# Patient Record
Sex: MALE | Race: WHITE | Employment: OTHER
[De-identification: names, ages, dates, MRNs, and addresses within clinical notes are randomized per-mention and may not be internally consistent; named-entity substitution may affect disease eponyms.]

---

## 2017-03-09 ENCOUNTER — SURGERY (OUTPATIENT)
Age: 67
End: 2017-03-09

## 2017-03-09 ENCOUNTER — ANESTHESIA (OUTPATIENT)
Dept: ENDOSCOPY | Age: 67
End: 2017-03-09
Payer: MEDICARE

## 2017-03-09 ENCOUNTER — APPOINTMENT (OUTPATIENT)
Dept: GENERAL RADIOLOGY | Age: 67
End: 2017-03-09
Attending: INTERNAL MEDICINE
Payer: MEDICARE

## 2017-03-09 ENCOUNTER — ANESTHESIA EVENT (OUTPATIENT)
Dept: ENDOSCOPY | Age: 67
End: 2017-03-09
Payer: MEDICARE

## 2017-03-09 PROCEDURE — 74011250636 HC RX REV CODE- 250/636

## 2017-03-09 PROCEDURE — 74011000250 HC RX REV CODE- 250

## 2017-03-09 RX ORDER — LIDOCAINE HYDROCHLORIDE 20 MG/ML
INJECTION, SOLUTION EPIDURAL; INFILTRATION; INTRACAUDAL; PERINEURAL AS NEEDED
Status: DISCONTINUED | OUTPATIENT
Start: 2017-03-09 | End: 2017-03-09 | Stop reason: HOSPADM

## 2017-03-09 RX ORDER — PROPOFOL 10 MG/ML
INJECTION, EMULSION INTRAVENOUS
Status: DISCONTINUED | OUTPATIENT
Start: 2017-03-09 | End: 2017-03-09 | Stop reason: HOSPADM

## 2017-03-09 RX ORDER — PROPOFOL 10 MG/ML
INJECTION, EMULSION INTRAVENOUS AS NEEDED
Status: DISCONTINUED | OUTPATIENT
Start: 2017-03-09 | End: 2017-03-09 | Stop reason: HOSPADM

## 2017-03-09 RX ADMIN — PROPOFOL 30 MG: 10 INJECTION, EMULSION INTRAVENOUS at 12:26

## 2017-03-09 RX ADMIN — PROPOFOL 35 MG: 10 INJECTION, EMULSION INTRAVENOUS at 12:22

## 2017-03-09 RX ADMIN — LIDOCAINE HYDROCHLORIDE 100 MG: 20 INJECTION, SOLUTION EPIDURAL; INFILTRATION; INTRACAUDAL; PERINEURAL at 12:22

## 2017-03-09 RX ADMIN — PROPOFOL 140 MCG/KG/MIN: 10 INJECTION, EMULSION INTRAVENOUS at 12:22

## 2017-03-09 NOTE — ANESTHESIA POSTPROCEDURE EVALUATION
Post-Anesthesia Evaluation and Assessment    Patient: Amaury Pisano MRN: [id number]  SSN: [id number]    YOB: 1950  Age: 78 y.o. Sex: male       Cardiovascular Function/Vital Signs  Visit Vitals    /74    Pulse 63    Temp 37 °C (98.6 °F)    Resp 16    Ht 6' (1.829 m)    Wt 73.9 kg (163 lb)    SpO2 100%    BMI 22.11 kg/m2       Patient is status post total IV anesthesia anesthesia for Procedure(s):  ESOPHAGOGASTRODUODENOSCOPY (EGD)   BMI 21  ESOPHAGEAL DILATION. Nausea/Vomiting: None    Postoperative hydration reviewed and adequate. Pain:  Pain Scale 1: Numeric (0 - 10) (03/09/17 1311)  Pain Intensity 1: 0 (03/09/17 1311)   Managed    Neurological Status: At baseline    Mental Status and Level of Consciousness: Arousable    Pulmonary Status:   O2 Device: Room air (03/09/17 1311)   Adequate oxygenation and airway patent    Complications related to anesthesia: None    Post-anesthesia assessment completed.  No concerns    Signed By: Lavern Singh MD     March 9, 2017

## 2017-03-09 NOTE — ANESTHESIA PREPROCEDURE EVALUATION
Anesthetic History               Review of Systems / Medical History  Patient summary reviewed, nursing notes reviewed and pertinent labs reviewed    Pulmonary                   Neuro/Psych              Cardiovascular              Hyperlipidemia    Exercise tolerance: >4 METS     GI/Hepatic/Renal     GERD: well controlled          Comments: Esophageal stricture Endo/Other        Arthritis and cancer (Hx Tongue CA)     Other Findings            Physical Exam    Airway  Mallampati: II  TM Distance: > 6 cm  Neck ROM: normal range of motion   Mouth opening: Normal     Cardiovascular  Regular rate and rhythm,  S1 and S2 normal,  no murmur, click, rub, or gallop             Dental  No notable dental hx       Pulmonary  Breath sounds clear to auscultation               Abdominal  GI exam deferred       Other Findings            Anesthetic Plan    ASA: 3  Anesthesia type: total IV anesthesia - Neris block            Anesthetic plan and risks discussed with: Patient

## 2017-07-24 ENCOUNTER — HOSPITAL ENCOUNTER (OUTPATIENT)
Age: 67
Setting detail: OUTPATIENT SURGERY
Discharge: HOME | End: 2017-07-24
Attending: INTERNAL MEDICINE | Admitting: INTERNAL MEDICINE
Payer: MEDICARE

## 2017-07-24 ENCOUNTER — ANESTHESIA (OUTPATIENT)
Dept: ENDOSCOPY | Age: 67
End: 2017-07-24
Payer: MEDICARE

## 2017-07-24 ENCOUNTER — ANESTHESIA EVENT (OUTPATIENT)
Dept: ENDOSCOPY | Age: 67
End: 2017-07-24
Payer: MEDICARE

## 2017-07-24 ENCOUNTER — APPOINTMENT (OUTPATIENT)
Dept: GENERAL RADIOLOGY | Age: 67
End: 2017-07-24
Attending: INTERNAL MEDICINE
Payer: MEDICARE

## 2017-07-24 VITALS
TEMPERATURE: 98.6 F | SYSTOLIC BLOOD PRESSURE: 135 MMHG | RESPIRATION RATE: 18 BRPM | HEIGHT: 72 IN | WEIGHT: 161 LBS | BODY MASS INDEX: 21.81 KG/M2 | DIASTOLIC BLOOD PRESSURE: 85 MMHG | OXYGEN SATURATION: 100 % | HEART RATE: 54 BPM

## 2017-07-24 PROCEDURE — 74011250636 HC RX REV CODE- 250/636: Performed by: ANESTHESIOLOGY

## 2017-07-24 PROCEDURE — 76040000025: Performed by: INTERNAL MEDICINE

## 2017-07-24 PROCEDURE — 74011250636 HC RX REV CODE- 250/636

## 2017-07-24 PROCEDURE — 74011000250 HC RX REV CODE- 250

## 2017-07-24 PROCEDURE — 76060000032 HC ANESTHESIA 0.5 TO 1 HR: Performed by: INTERNAL MEDICINE

## 2017-07-24 RX ORDER — PROPOFOL 10 MG/ML
INJECTION, EMULSION INTRAVENOUS
Status: DISCONTINUED | OUTPATIENT
Start: 2017-07-24 | End: 2017-07-24 | Stop reason: HOSPADM

## 2017-07-24 RX ORDER — FENTANYL CITRATE 50 UG/ML
INJECTION, SOLUTION INTRAMUSCULAR; INTRAVENOUS AS NEEDED
Status: DISCONTINUED | OUTPATIENT
Start: 2017-07-24 | End: 2017-07-24 | Stop reason: HOSPADM

## 2017-07-24 RX ORDER — SODIUM CHLORIDE, SODIUM LACTATE, POTASSIUM CHLORIDE, CALCIUM CHLORIDE 600; 310; 30; 20 MG/100ML; MG/100ML; MG/100ML; MG/100ML
1000 INJECTION, SOLUTION INTRAVENOUS CONTINUOUS
Status: DISCONTINUED | OUTPATIENT
Start: 2017-07-24 | End: 2017-07-24 | Stop reason: HOSPADM

## 2017-07-24 RX ORDER — PROPOFOL 10 MG/ML
INJECTION, EMULSION INTRAVENOUS AS NEEDED
Status: DISCONTINUED | OUTPATIENT
Start: 2017-07-24 | End: 2017-07-24 | Stop reason: HOSPADM

## 2017-07-24 RX ORDER — LIDOCAINE HYDROCHLORIDE 20 MG/ML
INJECTION, SOLUTION EPIDURAL; INFILTRATION; INTRACAUDAL; PERINEURAL AS NEEDED
Status: DISCONTINUED | OUTPATIENT
Start: 2017-07-24 | End: 2017-07-24 | Stop reason: HOSPADM

## 2017-07-24 RX ADMIN — SODIUM CHLORIDE, SODIUM LACTATE, POTASSIUM CHLORIDE, AND CALCIUM CHLORIDE 1000 ML: 600; 310; 30; 20 INJECTION, SOLUTION INTRAVENOUS at 11:48

## 2017-07-24 RX ADMIN — PROPOFOL 160 MCG/KG/MIN: 10 INJECTION, EMULSION INTRAVENOUS at 12:24

## 2017-07-24 RX ADMIN — LIDOCAINE HYDROCHLORIDE 50 MG: 20 INJECTION, SOLUTION EPIDURAL; INFILTRATION; INTRACAUDAL; PERINEURAL at 12:21

## 2017-07-24 RX ADMIN — PROPOFOL 50 MG: 10 INJECTION, EMULSION INTRAVENOUS at 12:24

## 2017-07-24 RX ADMIN — PROPOFOL 50 MG: 10 INJECTION, EMULSION INTRAVENOUS at 12:21

## 2017-07-24 RX ADMIN — FENTANYL CITRATE 50 MCG: 50 INJECTION, SOLUTION INTRAMUSCULAR; INTRAVENOUS at 12:17

## 2017-07-24 NOTE — H&P
Gastrointestinal Progress Note    7/24/2017    Admit Date: 7/24/2017    Subjective:     Patient is on NPO for an EGD/dilatation. Pain: Patient complains of no abdominal pain. Bowel Movements: Normal    Bleeding:  None    Current Facility-Administered Medications   Medication Dose Route Frequency    lactated Ringers infusion 1,000 mL  1,000 mL IntraVENous CONTINUOUS        Objective:     Blood pressure 126/74, pulse 67, temperature 97.9 °F (36.6 °C), resp. rate 18, height 6' (1.829 m), weight 73 kg (161 lb), SpO2 100 %. EXAM:  HEART: regular rate and rhythm, S1, S2 normal, no murmur, click, rub or gallop, LUNGS: chest clear, no wheezing, rales, normal symmetric air entry, Heart exam - S1, S2 normal, no murmur, no gallop, rate regular and ABDOMEN:  Bowel sounds are normal, liver is not enlarged, spleen is not enlarged    Data Review  No results found for this or any previous visit (from the past 24 hour(s)). Assessment:     Active Problems:  Dysphagia associated with a radiation stricture      Plan:     EGD/dilatation. Risks (bleed, perforation, infection, untoward CV or resp. Event, mortality,etc.), benefits, and alternatives were discussed with the patient who agrees to proceed.   Aniyah Ibrahim MD

## 2017-07-24 NOTE — ANESTHESIA PREPROCEDURE EVALUATION
Anesthetic History               Review of Systems / Medical History  Patient summary reviewed, nursing notes reviewed and pertinent labs reviewed    Pulmonary                   Neuro/Psych              Cardiovascular              Hyperlipidemia    Exercise tolerance: >4 METS     GI/Hepatic/Renal     GERD: well controlled          Comments: Esophageal stricture Endo/Other        Arthritis and cancer (Hx Tongue CA)     Other Findings              Physical Exam    Airway  Mallampati: II  TM Distance: > 6 cm  Neck ROM: normal range of motion   Mouth opening: Normal     Cardiovascular  Regular rate and rhythm,  S1 and S2 normal,  no murmur, click, rub, or gallop             Dental  No notable dental hx       Pulmonary  Breath sounds clear to auscultation               Abdominal  GI exam deferred       Other Findings            Anesthetic Plan    ASA: 3  Anesthesia type: total IV anesthesia            Anesthetic plan and risks discussed with: Patient

## 2017-07-24 NOTE — ANESTHESIA POSTPROCEDURE EVALUATION
Post-Anesthesia Evaluation and Assessment    Patient: Xochitl Rangel MRN: [id number]  SSN: [id number]    YOB: 1950  Age: 75 y.o. Sex: male       Cardiovascular Function/Vital Signs  Visit Vitals    /73    Pulse 67    Temp 37 °C (98.6 °F)    Resp 15    Ht 6' (1.829 m)    Wt 73 kg (161 lb)    SpO2 100%    BMI 21.84 kg/m2       Patient is status post total IV anesthesia anesthesia for Procedure(s):  ESOPHAGOGASTRODUODENOSCOPY (EGD)   BMI 21  ESOPHAGEAL DILATION. Nausea/Vomiting: None    Postoperative hydration reviewed and adequate. Pain:  Pain Scale 1: Visual (07/24/17 1249)  Pain Intensity 1: 0 (07/24/17 1249)   Managed    Neurological Status: At baseline    Mental Status and Level of Consciousness: Arousable    Pulmonary Status:   O2 Device: Nasal cannula (07/24/17 1251)   Adequate oxygenation and airway patent    Complications related to anesthesia: None    Post-anesthesia assessment completed.  No concerns    Signed By: Niya Ames MD     July 24, 2017

## 2017-07-24 NOTE — DISCHARGE INSTRUCTIONS
Gastrointestinal Esophagogastroduodenoscopy (EGD) - Upper Exam Discharge Instructions    1. Call Dr. Deepika Chen for any problems or questions. 2. Contact the doctor's office for follow up appointment as directed. 3. Medication may cause drowsiness for several hours, therefore, do not drive or operate machinery for remainder of the day. 4. No alcohol today. 5. Ordinarily, you may resume regular diet and activity after exam unless otherwise specified by your physician. 6. For mild soreness in your throat you may use Cepacol throat lozenges or warm salt-water gargles as needed. Any additional instructions: Follow up dilation in 4 months. Clear liquid diet, advance to full liquids, soft foods as tolerated. Instructions given to Cherylene Plank and other family members.   Instructions given by:  Brian Bacon RN

## 2017-07-24 NOTE — IP AVS SNAPSHOT
[address] 
[phone] Patient: Rogers Reynolds MRN: [id number] NYP:0/80/8243 You are allergic to the following Allergen Reactions Hydrocodone Itching Recent Documentation Height Weight BMI Smoking Status 1.829 m 73 kg 21.84 kg/m2 Former Smoker Emergency Contacts Name Discharge Info Relation Home Work Mobile Viet Lan CAREGIVER [3] Spouse [3] 61 [phone] About your hospitalization You were admitted on:  July 24, 2017 You last received care in the:  [facility] You were discharged on:  July 24, 2017 Unit phone number:  [phone] Why you were hospitalized Your primary diagnosis was:  Not on File Providers Seen During Your Hospitalizations Provider Role Specialty Primary office phone Edwardo Min MD Attending Provider Gastroenterology [phone] Your Primary Care Physician (PCP) Primary Care Physician Office Phone Office Fax [address]64 [facility], [address] [facility] [phone] Follow-up Information Follow up With Details Comments Contact Info Neftali Maradiaga MD   [id number] [facility] [address] [place] [address],[address] [id number] [facility] 160 
[phone] Your Appointments Wednesday August 09, 2017  8:10 AM EDT Follow Up with Neftali Maradiaga MD  
[address] (32 [facility]) 981 [facility] [place] [address] [phone] Current Discharge Medication List  
  
CONTINUE these medications which have CHANGED Dose & Instructions Dispensing Information Comments Morning Noon Evening Bedtime  
 cyclobenzaprine 5 mg tablet Commonly known as:  FLEXERIL What changed:   
- when to take this 
- reasons to take this Your last dose was: Your next dose is:    
   
   
 Dose:  5 mg Take 1 Tab by mouth two (2) times a day. Quantity:  30 Tab Refills:  1  
     
   
   
   
  
 valACYclovir 500 mg tablet Commonly known as:  VALTREX What changed:  See the new instructions. Your last dose was: Your next dose is: TAKE 1 TABLET TWICE DAILY Quantity:  40 Tab Refills:  1 CONTINUE these medications which have NOT CHANGED Dose & Instructions Dispensing Information Comments Morning Noon Evening Bedtime  
 aspirin delayed-release 81 mg tablet Your last dose was: Your next dose is:    
   
   
 Dose:  81 mg Take 81 mg by mouth nightly. Indications: prevention of transient ischemic attack, CONTINUED Refills:  0  
     
   
   
   
  
 atorvastatin 10 mg tablet Commonly known as:  LIPITOR Your last dose was: Your next dose is:    
   
   
 Dose:  10 mg Take 1 Tab by mouth nightly. Indications: hypercholesterolemia Quantity:  90 Tab Refills:  2  
     
   
   
   
  
 calcium-vitamin D 600 mg(1,500mg) -200 unit Tab Your last dose was: Your next dose is:    
   
   
 Dose:  0.5 Tab Take 0.5 Tabs by mouth two (2) times daily (with meals). Last taken 2/27/17  Indications: hold until after surgery. held since 3/4/16 Refills:  0  
     
   
   
   
  
 cholecalciferol 1,000 unit Cap Commonly known as:  VITAMIN D3 Your last dose was: Your next dose is:    
   
   
 Dose:  1000 Units Take 1,000 Units by mouth daily. Stopped 2/27/17  Indications: VITAMIN D DEFICIENCY, hold until after surgery. held since 7/19/16 Refills:  0 DULoxetine 60 mg capsule Commonly known as:  CYMBALTA Your last dose was: Your next dose is: TAKE 1 CAPSULE EVERY MORNING  FOR  NEUROPATHIC  PAIN Quantity:  90 Cap Refills:  1  
     
   
   
   
  
 gabapentin 300 mg capsule Commonly known as:  NEURONTIN Your last dose was: Your next dose is:    
   
   
 Dose:  300 mg Take 1 Cap by mouth Daily (before dinner). Quantity:  90 Cap Refills:  1 Iron 325 mg (65 mg iron) tablet Generic drug:  ferrous sulfate Your last dose was: Your next dose is:    
   
   
 Dose:  325 mg Take 325 mg by mouth Daily (before breakfast). Indications: IRON DEFICIENCY ANEMIA Refills:  0  
     
   
   
   
  
 lutein 6 mg Tab Your last dose was: Your next dose is: Take  by mouth daily. Refills:  0 MULTIPLE VITAMINS PO Your last dose was: Your next dose is:    
   
   
 Dose:  0.5 Tab Take 0.5 Tabs by mouth two (2) times a day. Refills:  0  
     
   
   
   
  
 omega-3 fatty acids-vitamin e 1,000 mg Cap Your last dose was: Your next dose is:    
   
   
 Dose:  1 Cap Take 1 Cap by mouth two (2) times a day. Refills:  0  
     
   
   
   
  
 omeprazole 20 mg capsule Commonly known as:  PRILOSEC Your last dose was: Your next dose is: TAKE 1 CAPSULE EVERY MORNING Quantity:  90 Cap Refills:  1 Discharge Instructions Gastrointestinal Esophagogastroduodenoscopy (EGD) - Upper Exam Discharge Instructions 1. Call Dr. Grazyna Contreras for any problems or questions. 2. Contact the doctor's office for follow up appointment as directed. 3. Medication may cause drowsiness for several hours, therefore, do not drive or operate machinery for remainder of the day. 4. No alcohol today. 5. Ordinarily, you may resume regular diet and activity after exam unless otherwise specified by your physician. 6. For mild soreness in your throat you may use Cepacol throat lozenges or warm salt-water gargles as needed. Any additional instructions: Follow up dilation in 4 months. Clear liquid diet, advance to full liquids, soft foods as tolerated. Instructions given to Raymond Lion and other family members. Instructions given by:  Mira Rivera RN Discharge Orders None Introducing [facility]! Dear Haylie Marr: Thank you for requesting a [employer] account. Our records indicate that you already have an active [employer] account. You can access your account anytime at [email or web address] Did you know that you can access your hospital and ER discharge instructions at any time in [employer]? You can also review all of your test results from your hospital stay or ER visit. Additional Information If you have questions, please visit the Frequently Asked Questions section of the [employer] website at [email or web address]/. Remember, [employer] is NOT to be used for urgent needs. For medical emergencies, dial 911. Now available from your iPhone and Android! General Information Please provide this summary of care documentation to your next provider. Patient Signature:  ____________________________________________________________ Date:  ____________________________________________________________  
  
Cal Jimenez Provider Signature:  ____________________________________________________________ Date:  ____________________________________________________________

## 2017-07-25 NOTE — PROCEDURES
[facility]   PROCEDURE NOTE       Name:  Peyton Miller   MR#:  [id number]   :  1950   Account #:  [de-identified]   Date of Adm:  2017       DATE OF PROCEDURE: 2017    NAME OF PROCEDURE: Esophagogastroduodenoscopy. SUBJECTIVE: A 30-year-old male is undergoing upper endoscopy   because of dysphagia associated with proximal subtle radiation   stricture. Upper endoscopy done today to evaluate his dysphagia   and we will proceed with further dilatations under fluoroscopy. Risks (bleeding, perforation, infection, untoward cardiovascular   or respiratory mortality), benefits, and alternatives were   discussed in detail with Mr. Hardy [place] who agrees to proceed. ANESTHESIA: Per MAC anesthesia. INSTRUMENT: [id number]. PROCEDURE: The patient placed in left decubitus position,   videoscope was passed through the upper pharynx and esophagus   with minimal difficulty. Proximal, mid and distal esophagus were   unremarkable except for a very subtle proximal stricture through   which the endoscope passed without difficulty. Once inside the   stomach, the body and antrum were grossly unremarkable except   for erythema on the greater curvature of the antrum. A retroflex   view of the angularis was normal. Retroflex view of the EG   junction and cardia were normal.  Attention was turned to the   duodenum. Duodenal bulb and C-loop were normal. The endoscope   was backed into the stomach. Under fluoroscopy, a guidewire was placed. Guidewire was   advanced through the endoscope and maintaining good position in   the antrum as the endoscope was removed from the patient. Over the guidewire, numbers 12, 14 and 16 Savary dilators were   serially passed under fluoroscopy. Fluoroscopy was used to maintain   good position of the guidewire as well as to document that in   fact the dilator had passed through the distal esophagus.  Once   the 16 had passed, both dilator and guidewire were removed from   the patient. Endoscope was advanced back into the esophagus through the   hypopharynx. There was minimal heme just distal to the   cricopharyngeus. Except for some minor scope irritation to the   duodenal bulb (probably related to the guidewire), no other   abnormalities were appreciated. The endoscope was removed and   the patient went to the recovery area in stable condition. IMPRESSION   1. Gastritis. 2. Subtle proximal esophageal strictures. 3. Savary dilatation performed as described above (using   fluoroscopy). PLAN   DIAGNOSTIC: Followup dilatation every 4 months. THERAPEUTIC: Continue current medications.         MD CHANTAL [place] / GUY   D:  07/24/2017   12:50   T:  07/24/2017   21:09   Job #:  [id number]

## 2017-12-19 ENCOUNTER — ANESTHESIA EVENT (OUTPATIENT)
Dept: ENDOSCOPY | Age: 67
End: 2017-12-19
Payer: MEDICARE

## 2017-12-19 RX ORDER — SODIUM CHLORIDE, SODIUM LACTATE, POTASSIUM CHLORIDE, CALCIUM CHLORIDE 600; 310; 30; 20 MG/100ML; MG/100ML; MG/100ML; MG/100ML
100 INJECTION, SOLUTION INTRAVENOUS CONTINUOUS
Status: CANCELLED | OUTPATIENT
Start: 2017-12-19

## 2017-12-19 RX ORDER — SODIUM CHLORIDE 0.9 % (FLUSH) 0.9 %
5-10 SYRINGE (ML) INJECTION AS NEEDED
Status: CANCELLED | OUTPATIENT
Start: 2017-12-19

## 2017-12-20 ENCOUNTER — HOSPITAL ENCOUNTER (OUTPATIENT)
Age: 67
Setting detail: OUTPATIENT SURGERY
Discharge: HOME | End: 2017-12-20
Attending: INTERNAL MEDICINE | Admitting: INTERNAL MEDICINE
Payer: MEDICARE

## 2017-12-20 ENCOUNTER — ANESTHESIA (OUTPATIENT)
Dept: ENDOSCOPY | Age: 67
End: 2017-12-20
Payer: MEDICARE

## 2017-12-20 ENCOUNTER — APPOINTMENT (OUTPATIENT)
Dept: GENERAL RADIOLOGY | Age: 67
End: 2017-12-20
Attending: INTERNAL MEDICINE
Payer: MEDICARE

## 2017-12-20 VITALS
OXYGEN SATURATION: 100 % | HEIGHT: 72 IN | SYSTOLIC BLOOD PRESSURE: 122 MMHG | HEART RATE: 71 BPM | DIASTOLIC BLOOD PRESSURE: 71 MMHG | RESPIRATION RATE: 20 BRPM | TEMPERATURE: 97.9 F | WEIGHT: 162 LBS | BODY MASS INDEX: 21.94 KG/M2

## 2017-12-20 PROCEDURE — 74011250636 HC RX REV CODE- 250/636

## 2017-12-20 PROCEDURE — 74011000250 HC RX REV CODE- 250: Performed by: ANESTHESIOLOGY

## 2017-12-20 PROCEDURE — 74011250636 HC RX REV CODE- 250/636: Performed by: ANESTHESIOLOGY

## 2017-12-20 PROCEDURE — 76040000025: Performed by: INTERNAL MEDICINE

## 2017-12-20 PROCEDURE — 76060000031 HC ANESTHESIA FIRST 0.5 HR: Performed by: INTERNAL MEDICINE

## 2017-12-20 RX ORDER — PROPOFOL 10 MG/ML
INJECTION, EMULSION INTRAVENOUS AS NEEDED
Status: DISCONTINUED | OUTPATIENT
Start: 2017-12-20 | End: 2017-12-20 | Stop reason: HOSPADM

## 2017-12-20 RX ORDER — SODIUM CHLORIDE, SODIUM LACTATE, POTASSIUM CHLORIDE, CALCIUM CHLORIDE 600; 310; 30; 20 MG/100ML; MG/100ML; MG/100ML; MG/100ML
100 INJECTION, SOLUTION INTRAVENOUS CONTINUOUS
Status: DISCONTINUED | OUTPATIENT
Start: 2017-12-20 | End: 2017-12-20 | Stop reason: HOSPADM

## 2017-12-20 RX ADMIN — SODIUM CHLORIDE, SODIUM LACTATE, POTASSIUM CHLORIDE, AND CALCIUM CHLORIDE 100 ML/HR: 600; 310; 30; 20 INJECTION, SOLUTION INTRAVENOUS at 11:56

## 2017-12-20 RX ADMIN — PROPOFOL 50 MG: 10 INJECTION, EMULSION INTRAVENOUS at 12:56

## 2017-12-20 RX ADMIN — PROPOFOL 40 MG: 10 INJECTION, EMULSION INTRAVENOUS at 12:59

## 2017-12-20 RX ADMIN — PROPOFOL 50 MG: 10 INJECTION, EMULSION INTRAVENOUS at 12:53

## 2017-12-20 RX ADMIN — FAMOTIDINE 20 MG: 10 INJECTION, SOLUTION INTRAVENOUS at 12:01

## 2017-12-20 NOTE — ROUTINE PROCESS
Pt. Discharged to car by Kya Dias with family . Vital signs stable. Able to tolerate PO fluids. Passing gas.  Seen by MD.

## 2017-12-20 NOTE — ANESTHESIA POSTPROCEDURE EVALUATION
Post-Anesthesia Evaluation and Assessment    Patient: Ade Blount MRN: [id number]  SSN: [id number]    YOB: 1950  Age: 75 y.o. Sex: male       Cardiovascular Function/Vital Signs  Visit Vitals    BP (!) 147/93    Pulse 70    Temp 36.6 °C (97.9 °F)    Resp 20    Ht 6' (1.829 m)    Wt 73.5 kg (162 lb)    SpO2 100%    BMI 21.97 kg/m2       Patient is status post total IV anesthesia anesthesia for Procedure(s):  ESOPHAGOGASTRODUODENOSCOPY (EGD)/ BMI=21  ESOPHAGEAL DILATION. Nausea/Vomiting: None    Postoperative hydration reviewed and adequate. Pain:  Pain Scale 1: Numeric (0 - 10) (12/20/17 1333)  Pain Intensity 1: 0 (12/20/17 1145)   Managed    Neurological Status: At baseline    Mental Status and Level of Consciousness: Arousable    Pulmonary Status:   O2 Device: Room air (12/20/17 1333)   Adequate oxygenation and airway patent    Complications related to anesthesia: None    Post-anesthesia assessment completed.  No concerns    Signed By: Talia Murray MD     December 20, 2017

## 2017-12-20 NOTE — DISCHARGE INSTRUCTIONS
Gastrointestinal Esophagogastroduodenoscopy (EGD) - Upper Exam Discharge Instructions    1. Call Dr. Gwen Malin at [phone] for any problems or questions. 2. Contact the doctor's office for follow up appointment as directed. 3. Medication may cause drowsiness for several hours, therefore, do not drive or operate machinery for remainder of the day. 4. No alcohol today. 5. Ordinarily, you may resume regular diet and activity after exam unless otherwise specified by your physician. 6. For mild soreness in your throat you may use  throat lozenges or warm salt-water gargles as needed. Any additional instructions:   Follow up as needed

## 2017-12-20 NOTE — H&P
Gastrointestinal Progress Note    12/20/2017    Admit Date: 12/20/2017    Subjective:     Patient is NPO for an EGD/dilatation. Pain: Patient complains of no abdominal pain. Bowel Movements: Normal    Bleeding:  None    Current Facility-Administered Medications   Medication Dose Route Frequency    lactated Ringers infusion  100 mL/hr IntraVENous CONTINUOUS        Objective:     Blood pressure 112/69, pulse 70, temperature 97.9 °F (36.6 °C), resp. rate 16, height 6' (1.829 m), weight 73.5 kg (162 lb), SpO2 100 %. EXAM:  HEART: regular rate and rhythm, S1, S2 normal, no murmur, click, rub or gallop, LUNGS: chest clear, no wheezing, rales, normal symmetric air entry, Heart exam - S1, S2 normal, no murmur, no gallop, rate regular and ABDOMEN:  Bowel sounds are normal, liver is not enlarged, spleen is not enlarged    Data Review  No results found for this or any previous visit (from the past 24 hour(s)). Assessment:     Active Problems:  Dysphagia associated with a radiation stricture        Plan:     EGD/dilatation. Risks (bleed, perforation, infection, untoward CV or resp. Event, mortality, etc.), benefits and alternatives were discussed with the patient who agrees to proceed.   Monika Choi MD

## 2017-12-20 NOTE — IP AVS SNAPSHOT
Ara Hunt 
 
 
 [address] [facility] [address] [facility] 
[phone] Patient: Farooq Valles MRN: [id number] DLA:8/76/[id number] About your hospitalization You were admitted on:  December 20, 2017 You last received care in the:  [facility] You were discharged on:  December 20, 2017 Why you were hospitalized Your primary diagnosis was:  Not on File Things You Need To Do (next 8 weeks) Follow up with Ilda eKvin MD  
  
Phone:  [phone] Where:  [address] [facility], , [address] [id number] Tuesday Feb 13, 2018 Follow Up with Ilda Kevin MD at  8:10 AM  
Where:  [address] ([address] [place] [address]) Discharge Orders None A check aspen indicates which time of day the medication should be taken. My Medications TAKE these medications as instructed Instructions Each Dose to Equal  
 Morning Noon Evening Bedtime  
 aspirin delayed-release 81 mg tablet Your last dose was: Your next dose is: Take 81 mg by mouth nightly. Indications: prevention of transient ischemic attack, CONTINUED 81 mg  
    
   
   
   
  
 atorvastatin 10 mg tablet Commonly known as:  LIPITOR Your last dose was: Your next dose is: Take 1 Tab by mouth nightly. Indications: hypercholesterolemia 10 mg  
    
   
   
   
  
 calcium-vitamin D 600 mg(1,500mg) -200 unit Tab Your last dose was: Your next dose is: Take 0.5 Tabs by mouth two (2) times daily (with meals). Last taken 2/27/17  Indications: hold until after surgery. held since 3/4/16  
 0.5 Tab  
    
   
   
   
  
 celecoxib 200 mg capsule Commonly known as:  CELEBREX Your last dose was: Your next dose is:    
   
   
      
   
   
   
  
 cholecalciferol 1,000 unit Cap Commonly known as:  VITAMIN D3  
   
 Your last dose was: Your next dose is: Take 1,000 Units by mouth daily. Stopped 2/27/17  Indications: VITAMIN D DEFICIENCY, hold until after surgery. held since 7/19/16  
 1000 Units  
    
   
   
   
  
 cyclobenzaprine 5 mg tablet Commonly known as:  FLEXERIL Your last dose was: Your next dose is: Take 1 Tab by mouth two (2) times a day. 5 mg DULoxetine 60 mg capsule Commonly known as:  CYMBALTA Your last dose was: Your next dose is: TAKE 1 CAPSULE EVERY MORNING  FOR  NEUROPATHIC  PAIN  
     
   
   
   
  
 gabapentin 300 mg capsule Commonly known as:  NEURONTIN Your last dose was: Your next dose is: Take 1 Cap by mouth Daily (before dinner). 300 mg  
    
   
   
   
  
 ibuprofen 200 mg tablet Commonly known as:  MOTRIN Your last dose was: Your next dose is: Take 200 mg by mouth as needed. 200 mg Iron 325 mg (65 mg iron) tablet Generic drug:  ferrous sulfate Your last dose was: Your next dose is: Take 325 mg by mouth Daily (before breakfast). Indications: IRON DEFICIENCY ANEMIA  
 325 mg  
    
   
   
   
  
 lutein 6 mg Tab Your last dose was: Your next dose is: Take  by mouth daily. MULTIPLE VITAMINS PO Your last dose was: Your next dose is: Take 0.5 Tabs by mouth two (2) times a day. 0.5 Tab  
    
   
   
   
  
 omega-3 fatty acids-vitamin e 1,000 mg Cap Your last dose was: Your next dose is: Take 1 Cap by mouth two (2) times a day. 1 Cap  
    
   
   
   
  
 omeprazole 20 mg capsule Commonly known as:  PRILOSEC Your last dose was: Your next dose is: TAKE 1 CAPSULE EVERY MORNING  
     
   
   
   
  
 RESTASIS 0.05 % ophthalmic emulsion Generic drug:  cycloSPORINE Your last dose was: Your next dose is:    
   
   
      
   
   
   
  
 valACYclovir 500 mg tablet Commonly known as:  VALTREX Your last dose was: Your next dose is: TAKE 1 TABLET TWICE DAILY Discharge Instructions Gastrointestinal Esophagogastroduodenoscopy (EGD) - Upper Exam Discharge Instructions 1. Call Dr. Nelson Muniz at [phone] for any problems or questions. 2. Contact the doctor's office for follow up appointment as directed. 3. Medication may cause drowsiness for several hours, therefore, do not drive or operate machinery for remainder of the day. 4. No alcohol today. 5. Ordinarily, you may resume regular diet and activity after exam unless otherwise specified by your physician. 6. For mild soreness in your throat you may use  throat lozenges or warm salt-water gargles as needed. Any additional instructions: Follow up as needed Introducing hospitals & HEALTH SERVICES! Dear Jeff Kay: Thank you for requesting a [employer] account. Our records indicate that you already have an active [employer] account. You can access your account anytime at [email or web address] Did you know that you can access your hospital and ER discharge instructions at any time in Game Ins[employer]? You can also review all of your test results from your hospital stay or ER visit. Additional Information If you have questions, please visit the Frequently Asked Questions section of the Game Ins[employer] website at [email or web address] Remember, Game Insight is NOT to be used for urgent needs. For medical emergencies, dial 911. Now available from your iPhone and Android! Unresulted Labs-Please follow up with your PCP about these lab tests Order Current Status NC XR TECHNOLOGIST SERVICE In process Providers Seen During Your Hospitalization Provider Specialty Primary office phone Antelmo Thorpe MD Gastroenterology [phone] Your Primary Care Physician (PCP) Primary Care Physician Office Phone Office Fax [address] [facility], [address] [phone] You are allergic to the following Allergen Reactions Hydrocodone Itching Recent Documentation Height Weight BMI Smoking Status 1.829 m 73.5 kg 21.97 kg/m2 Former Smoker Emergency Contacts Name Discharge Info Relation Home Work Mobile Alyse Naik CAREGIVER [3] Spouse [3] [phone] Patient Belongings The following personal items are in your possession at time of discharge: 
  Dental Appliances: None  Visual Aid: Glasses Please provide this summary of care documentation to your next provider. Signatures-by signing, you are acknowledging that this After Visit Summary has been reviewed with you and you have received a copy. Patient Signature:  ____________________________________________________________ Date:  ____________________________________________________________  
  
Josie Artist Provider Signature:  ____________________________________________________________ Date:  ____________________________________________________________

## 2017-12-21 NOTE — OP NOTES
[facility]  OPERATIVE REPORT    Lynn Berman  MR#: [id number]  : 1950  ACCOUNT #: [de-identified]   DATE OF SERVICE: 2017    DATE OF PROCEDURE:  2017    SURGEON:  Melissa Vega MD    PROCEDURE PERFORMED:  Esophagogastroduodenoscopy. ANESTHESIA:  MAC    ESTIMATED BLOOD LOSS:  None    SPECIMENS REMOVED:  none    COMPLICATIONS:  none    SUBJECTIVE:  A 18-year-old male was undergoing upper endoscopy because of dysphagia and a history of radiation stricture. Upper endoscopy done to document the source of his worsening dysphagia and proceed with dilatations under fluoroscopy. Risks (bleeding, perforation, infection, untoward cardiovascular or respiratory event, mortality), benefits and alternatives were discussed in detail with the patient, who agrees to proceed. ANESTHESIA:  As per MAC anesthesia. INSTRUMENT:  GIF Q180 videoendoscope. DESCRIPTION OF PROCEDURE:  The videoendoscope was passed through the hypopharynx and esophagus with minimal difficulty. There was a narrowing at the cricopharyngeus, but we advanced the endoscope through this area with minimal difficulty. Proximal, mid and distal esophagus were unmarkable. Once in the stomach, the body and antrum were remarkable for mild streaky erythema. A retroflexed view of the angularis was normal.  Retroflexed view of the GE junction and cardia revealed no other specific abnormalities. Attention turned to the duodenum. Duodenal bulb and C loop were normal.  No active ulcer disease was seen. The endoscope was passed to the stomach. Under fluoroscopy, the Savary guidewire was advanced into the gastric lumen, and maintain good positioning after the endoscope was removed from the patient.     Over the guidewire, numbers 12, 14 and Savary dilators (36, 42 and 1900 Jobinasecond) were  serially passed and under fluoroscopy, I documented that the widest part of the dilator had advanced through the GE junction. Once the 48 had passed, both dilator and guidewire were removed from the patient. The endoscope was then advanced back into the stomach. There was minimal heme at the cricopharyngeus, but mucosa appeared intact. The proximal mid and distal esophagus as well as the proximal body of stomach were unremarkable. The endoscope was passed to the proximal stomach where air was decompressed and back out of the patient. The vocal cords appeared normal. The patient taken to the recovery area in stable condition. IMPRESSION:  1. Radiation stricture - dilated up to 16 mm (48 Cymro via Savary dilator with minimal difficulty. 2.  Gastritis. PLAN/THERAPY:    1. Followup dilatation in 4 months. 2.  Continue PPI therapy.       MD CHANTAL Kraus / [email or web address].Chris  D: 12/20/2017 13:22     T: 12/20/2017 21:12  JOB #: [id number]  CC: Kianna Smith MD  [address] [facility]  [place], [address] [facility]

## 2018-04-11 ENCOUNTER — ANESTHESIA EVENT (OUTPATIENT)
Dept: ENDOSCOPY | Age: 68
End: 2018-04-11
Payer: MEDICARE

## 2018-04-11 RX ORDER — OXYCODONE AND ACETAMINOPHEN 5; 325 MG/1; MG/1
1 TABLET ORAL AS NEEDED
Status: CANCELLED | OUTPATIENT
Start: 2018-04-11

## 2018-04-11 RX ORDER — SODIUM CHLORIDE, SODIUM LACTATE, POTASSIUM CHLORIDE, CALCIUM CHLORIDE 600; 310; 30; 20 MG/100ML; MG/100ML; MG/100ML; MG/100ML
100 INJECTION, SOLUTION INTRAVENOUS CONTINUOUS
Status: CANCELLED | OUTPATIENT
Start: 2018-04-11 | End: 2018-04-12

## 2018-04-11 RX ORDER — SODIUM CHLORIDE 0.9 % (FLUSH) 0.9 %
5-10 SYRINGE (ML) INJECTION AS NEEDED
Status: CANCELLED | OUTPATIENT
Start: 2018-04-11

## 2018-04-11 RX ORDER — MIDAZOLAM HYDROCHLORIDE 1 MG/ML
2 INJECTION, SOLUTION INTRAMUSCULAR; INTRAVENOUS
Status: CANCELLED | OUTPATIENT
Start: 2018-04-11

## 2018-04-11 RX ORDER — HYDROMORPHONE HYDROCHLORIDE 2 MG/ML
0.5 INJECTION, SOLUTION INTRAMUSCULAR; INTRAVENOUS; SUBCUTANEOUS
Status: CANCELLED | OUTPATIENT
Start: 2018-04-11

## 2018-04-11 RX ORDER — SODIUM CHLORIDE, SODIUM LACTATE, POTASSIUM CHLORIDE, CALCIUM CHLORIDE 600; 310; 30; 20 MG/100ML; MG/100ML; MG/100ML; MG/100ML
100 INJECTION, SOLUTION INTRAVENOUS CONTINUOUS
Status: CANCELLED | OUTPATIENT
Start: 2018-04-11

## 2018-04-11 RX ORDER — SODIUM CHLORIDE 0.9 % (FLUSH) 0.9 %
5-10 SYRINGE (ML) INJECTION EVERY 8 HOURS
Status: CANCELLED | OUTPATIENT
Start: 2018-04-11

## 2018-04-11 RX ORDER — SODIUM CHLORIDE 9 MG/ML
50 INJECTION, SOLUTION INTRAVENOUS CONTINUOUS
Status: CANCELLED | OUTPATIENT
Start: 2018-04-11 | End: 2018-04-12

## 2018-04-11 RX ORDER — FAMOTIDINE 20 MG/1
20 TABLET, FILM COATED ORAL ONCE
Status: CANCELLED | OUTPATIENT
Start: 2018-04-11 | End: 2018-04-11

## 2018-04-11 RX ORDER — LIDOCAINE HYDROCHLORIDE 10 MG/ML
0.1 INJECTION INFILTRATION; PERINEURAL AS NEEDED
Status: CANCELLED | OUTPATIENT
Start: 2018-04-11

## 2018-04-11 RX ORDER — FENTANYL CITRATE 50 UG/ML
25 INJECTION, SOLUTION INTRAMUSCULAR; INTRAVENOUS ONCE
Status: CANCELLED | OUTPATIENT
Start: 2018-04-11 | End: 2018-04-11

## 2018-04-11 RX ORDER — OXYCODONE HYDROCHLORIDE 5 MG/1
5 TABLET ORAL
Status: CANCELLED | OUTPATIENT
Start: 2018-04-11

## 2018-04-11 RX ORDER — DIPHENHYDRAMINE HYDROCHLORIDE 50 MG/ML
12.5 INJECTION, SOLUTION INTRAMUSCULAR; INTRAVENOUS ONCE
Status: CANCELLED | OUTPATIENT
Start: 2018-04-11 | End: 2018-04-11

## 2018-04-11 RX ORDER — MIDAZOLAM HYDROCHLORIDE 1 MG/ML
2 INJECTION, SOLUTION INTRAMUSCULAR; INTRAVENOUS ONCE
Status: CANCELLED | OUTPATIENT
Start: 2018-04-11 | End: 2018-04-11

## 2018-04-12 ENCOUNTER — APPOINTMENT (OUTPATIENT)
Dept: GENERAL RADIOLOGY | Age: 68
End: 2018-04-12
Attending: INTERNAL MEDICINE
Payer: MEDICARE

## 2018-04-12 ENCOUNTER — ANESTHESIA (OUTPATIENT)
Dept: ENDOSCOPY | Age: 68
End: 2018-04-12
Payer: MEDICARE

## 2018-04-12 ENCOUNTER — HOSPITAL ENCOUNTER (OUTPATIENT)
Age: 68
Setting detail: OUTPATIENT SURGERY
Discharge: HOME | End: 2018-04-12
Attending: INTERNAL MEDICINE | Admitting: INTERNAL MEDICINE
Payer: MEDICARE

## 2018-04-12 VITALS
BODY MASS INDEX: 21.81 KG/M2 | DIASTOLIC BLOOD PRESSURE: 73 MMHG | HEART RATE: 69 BPM | WEIGHT: 161 LBS | SYSTOLIC BLOOD PRESSURE: 106 MMHG | TEMPERATURE: 98.6 F | OXYGEN SATURATION: 100 % | RESPIRATION RATE: 16 BRPM | HEIGHT: 72 IN

## 2018-04-12 PROCEDURE — 76060000032 HC ANESTHESIA 0.5 TO 1 HR: Performed by: INTERNAL MEDICINE

## 2018-04-12 PROCEDURE — 76040000025: Performed by: INTERNAL MEDICINE

## 2018-04-12 PROCEDURE — 74011250636 HC RX REV CODE- 250/636

## 2018-04-12 RX ORDER — SODIUM CHLORIDE, SODIUM LACTATE, POTASSIUM CHLORIDE, CALCIUM CHLORIDE 600; 310; 30; 20 MG/100ML; MG/100ML; MG/100ML; MG/100ML
INJECTION, SOLUTION INTRAVENOUS
Status: DISCONTINUED | OUTPATIENT
Start: 2018-04-12 | End: 2018-04-12 | Stop reason: HOSPADM

## 2018-04-12 RX ORDER — PROPOFOL 10 MG/ML
INJECTION, EMULSION INTRAVENOUS
Status: DISCONTINUED | OUTPATIENT
Start: 2018-04-12 | End: 2018-04-12 | Stop reason: HOSPADM

## 2018-04-12 RX ORDER — PROPOFOL 10 MG/ML
INJECTION, EMULSION INTRAVENOUS AS NEEDED
Status: DISCONTINUED | OUTPATIENT
Start: 2018-04-12 | End: 2018-04-12 | Stop reason: HOSPADM

## 2018-04-12 RX ADMIN — PROPOFOL 20 MG: 10 INJECTION, EMULSION INTRAVENOUS at 12:48

## 2018-04-12 RX ADMIN — PROPOFOL 10 MG: 10 INJECTION, EMULSION INTRAVENOUS at 12:46

## 2018-04-12 RX ADMIN — PROPOFOL 20 MG: 10 INJECTION, EMULSION INTRAVENOUS at 12:45

## 2018-04-12 RX ADMIN — PROPOFOL 10 MG: 10 INJECTION, EMULSION INTRAVENOUS at 12:43

## 2018-04-12 RX ADMIN — PROPOFOL 20 MG: 10 INJECTION, EMULSION INTRAVENOUS at 12:54

## 2018-04-12 RX ADMIN — PROPOFOL 10 MG: 10 INJECTION, EMULSION INTRAVENOUS at 12:55

## 2018-04-12 RX ADMIN — PROPOFOL 30 MG: 10 INJECTION, EMULSION INTRAVENOUS at 12:42

## 2018-04-12 RX ADMIN — PROPOFOL 50 MG: 10 INJECTION, EMULSION INTRAVENOUS at 12:40

## 2018-04-12 RX ADMIN — PROPOFOL 20 MG: 10 INJECTION, EMULSION INTRAVENOUS at 12:50

## 2018-04-12 RX ADMIN — PROPOFOL 20 MG: 10 INJECTION, EMULSION INTRAVENOUS at 12:44

## 2018-04-12 RX ADMIN — PROPOFOL 200 MCG/KG/MIN: 10 INJECTION, EMULSION INTRAVENOUS at 12:40

## 2018-04-12 RX ADMIN — PROPOFOL 20 MG: 10 INJECTION, EMULSION INTRAVENOUS at 12:52

## 2018-04-12 RX ADMIN — SODIUM CHLORIDE, SODIUM LACTATE, POTASSIUM CHLORIDE, CALCIUM CHLORIDE: 600; 310; 30; 20 INJECTION, SOLUTION INTRAVENOUS at 12:32

## 2018-04-12 RX ADMIN — PROPOFOL 20 MG: 10 INJECTION, EMULSION INTRAVENOUS at 12:56

## 2018-04-12 NOTE — ROUTINE PROCESS
Patient discharged home via wheelchair with family to home. Discharge instructions given to patient and wife and understanding expressed.

## 2018-04-12 NOTE — ANESTHESIA POSTPROCEDURE EVALUATION
Post-Anesthesia Evaluation and Assessment    Patient: Bard Alvares MRN: [id number]  SSN: [id number]    YOB: 1950  Age: 75 y.o. Sex: male       Cardiovascular Function/Vital Signs  Visit Vitals    /68    Pulse 67    Temp 37 °C (98.6 °F)    Resp 16    Ht 6' (1.829 m)    Wt 73 kg (161 lb)    SpO2 100%    BMI 21.84 kg/m2       Patient is status post total IV anesthesia anesthesia for Procedure(s):  ESOPHAGOGASTRODUODENOSCOPY (EGD)   BMI 28  ESOPHAGEAL DILATION. Nausea/Vomiting: None    Postoperative hydration reviewed and adequate. Pain:  Pain Scale 1: Numeric (0 - 10) (04/12/18 1159)  Pain Intensity 1: 0 (04/12/18 1159)   Managed    Neurological Status: At baseline    Mental Status and Level of Consciousness: Arousable    Pulmonary Status:   O2 Device: Room air (04/12/18 1305)   Adequate oxygenation and airway patent    Complications related to anesthesia: None    Post-anesthesia assessment completed.  No concerns    Signed By: Salud Albert MD     April 12, 2018

## 2018-04-12 NOTE — ANESTHESIA PREPROCEDURE EVALUATION
Anesthetic History   No history of anesthetic complications            Review of Systems / Medical History  Patient summary reviewed and pertinent labs reviewed    Pulmonary  Within defined limits                 Neuro/Psych   Within defined limits           Cardiovascular              Hyperlipidemia    Exercise tolerance: >4 METS     GI/Hepatic/Renal     GERD           Endo/Other        Arthritis, cancer (base of tongue, s/p radiation) and anemia     Other Findings   Comments: Esophageal stricture           Physical Exam    Airway  Mallampati: II  TM Distance: 4 - 6 cm  Neck ROM: normal range of motion   Mouth opening: Normal     Cardiovascular  Regular rate and rhythm,  S1 and S2 normal,  no murmur, click, rub, or gallop  Rhythm: regular  Rate: normal         Dental    Dentition: Bridges     Pulmonary  Breath sounds clear to auscultation               Abdominal  GI exam deferred       Other Findings            Anesthetic Plan    ASA: 2  Anesthesia type: total IV anesthesia          Induction: Intravenous  Anesthetic plan and risks discussed with: Patient

## 2018-04-12 NOTE — IP AVS SNAPSHOT
[address] [facility] 
 
 
 [address] 
[phone] Patient: Rogers Reynolds MRN: [id number] WWL:9/60/5874 About your hospitalization You were admitted on:  April 12, 2018 You last received care in the:  [facility] You were discharged on:  April 12, 2018 Why you were hospitalized Your primary diagnosis was:  Not on File Follow-up Information Follow up With Details Comments Contact Info Cleopatra Huff MD   [id number] [address] [facility] [address] 
[phone] Your Scheduled Appointments Monday May 14, 2018  8:40 AM EDT Office Visit with Cleopatra Huff MD  
[address] ([address] [place] [address]) [address] [address]  
[phone] Discharge Orders None A check aspen indicates which time of day the medication should be taken. My Medications CHANGE how you take these medications Instructions Each Dose to Equal  
 Morning Noon Evening Bedtime  
 cyclobenzaprine 5 mg tablet Commonly known as:  FLEXERIL What changed:   
- when to take this 
- reasons to take this Your last dose was: Your next dose is: Take 1 Tab by mouth two (2) times a day. 5 mg  
    
   
   
   
  
 valACYclovir 500 mg tablet Commonly known as:  VALTREX What changed:  See the new instructions. Your last dose was: Your next dose is: TAKE 1 TABLET TWICE DAILY CONTINUE taking these medications Instructions Each Dose to Equal  
 Morning Noon Evening Bedtime  
 aspirin delayed-release 81 mg tablet Your last dose was: Your next dose is: Take 81 mg by mouth nightly. Indications: prevention of transient ischemic attack, CONTINUED 81 mg  
    
   
   
   
  
 atorvastatin 10 mg tablet Commonly known as:  LIPITOR Your last dose was: Your next dose is: Take 1 Tab by mouth nightly. Indications: hypercholesterolemia 10 mg  
    
   
   
   
  
 calcium-vitamin D 600 mg(1,500mg) -200 unit Tab Your last dose was: Your next dose is: Take 0.5 Tabs by mouth two (2) times daily (with meals). Last taken 2/27/17  Indications: hold until after surgery. held since 3/4/16  
 0.5 Tab  
    
   
   
   
  
 celecoxib 200 mg capsule Commonly known as:  CELEBREX Your last dose was: Your next dose is:    
   
   
 as needed. cholecalciferol 1,000 unit Cap Commonly known as:  VITAMIN D3 Your last dose was: Your next dose is: Take 1,000 Units by mouth daily. Stopped 2/27/17  Indications: VITAMIN D DEFICIENCY, hold until after surgery. held since 7/19/16  
 1000 Units DULoxetine 60 mg capsule Commonly known as:  CYMBALTA Your last dose was: Your next dose is: TAKE 1 CAPSULE EVERY MORNING  FOR  NEUROPATHIC  PAIN  
     
   
   
   
  
 gabapentin 300 mg capsule Commonly known as:  NEURONTIN Your last dose was: Your next dose is: Take 1 Cap by mouth Daily (before dinner). 300 mg  
    
   
   
   
  
 ibuprofen 200 mg tablet Commonly known as:  MOTRIN Your last dose was: Your next dose is: Take 200 mg by mouth as needed. 200 mg Iron 325 mg (65 mg iron) tablet Generic drug:  ferrous sulfate Your last dose was: Your next dose is: Take 325 mg by mouth Daily (before breakfast). Indications: IRON DEFICIENCY ANEMIA  
 325 mg  
    
   
   
   
  
 lutein 6 mg Tab Your last dose was: Your next dose is: Take 20 mg by mouth daily. 20 mg  
    
   
   
   
  
 MULTIPLE VITAMINS PO Your last dose was: Your next dose is: Take 0.5 Tabs by mouth two (2) times a day. 0.5 Tab  
    
   
   
   
  
 omega-3 fatty acids-vitamin e 1,000 mg Cap Your last dose was: Your next dose is: Take 1 Cap by mouth two (2) times a day. 1 Cap  
    
   
   
   
  
 omeprazole 20 mg capsule Commonly known as:  PRILOSEC Your last dose was: Your next dose is: TAKE 1 CAPSULE EVERY MORNING  
     
   
   
   
  
 RESTASIS 0.05 % ophthalmic emulsion Generic drug:  cycloSPORINE Your last dose was: Your next dose is:    
   
   
      
   
   
   
  
  
  
  
Discharge Instructions Gastrointestinal Esophagogastroduodenoscopy (EGD) - Upper Exam Discharge Instructions 1. Call Dr. Bar Beckwith at [phone] for any problems or questions. 2. Contact the doctor's office for follow up appointment as directed. 3. Medication may cause drowsiness for several hours, therefore, do not drive or operate machinery for remainder of the day. 4. No alcohol today. 5. Ordinarily, you may resume regular diet and activity after exam unless otherwise specified by your physician. 6. For mild soreness in your throat you may use Cepacol throat lozenges or warm salt-water gargles as needed. Any additional instructions: soft diet for the next 24 hours, repeat dilation in 4 months Instructions given to Stalin Gerardo and other family members. Instructions given by: Introducing \[facility] [place] [facility]\"" & HEALTH SERVICES! Dear Demarcus Matta: Thank you for requesting a [email or web address] account. Our records indicate that you already have an active [email or web address] account. You can access your account anytime at [email or web address]/Social Rewards Did you know that you can access your hospital and ER discharge instructions at any time in [email or web address]? You can also review all of your test results from your hospital stay or ER visit. Additional Information If you have questions, please visit the Frequently Asked Questions section of the MyChart website at [email or web address] Remember, Connectipityhart is NOT to be used for urgent needs. For medical emergencies, dial 911. Now available from your iPhone and Android! Introducing Roge Ruiz As a [employer] patient, I wanted to make you aware of our electronic visit tool called Roge Ruiz. [employer] 24/7 allows you to connect within minutes with a medical provider 24 hours a day, seven days a week via a mobile device or tablet or logging into a secure website from your computer. You can access Roge Ruiz from anywhere in the [place]. A virtual visit might be right for you when you have a simple condition and feel like you just dont want to get out of bed, or cant get away from work for an appointment, when your regular [employer] provider is not available (evenings, weekends or holidays), or when youre out of town and need minor care. Electronic visits cost only $49 and if the [employer] 24/7 provider determines a prescription is needed to treat your condition, one can be electronically transmitted to a nearby pharmacy*. Please take a moment to enroll today if you have not already done so. The enrollment process is free and takes just a few minutes. To enroll, please download the [employer] 24/7 poly to your tablet or phone, or visit [email or web address] to enroll on your computer. And, as an [address] [place] [address] patient with a [employer] account, the results of your visits will be scanned into your electronic medical record and your primary care provider will be able to view the scanned results. We urge you to continue to see your regular [employer] provider for your ongoing medical care.   And while your primary care provider may not be the one available when you seek a Roge Ruiz virtual visit, the peace of mind you get from getting a real diagnosis real time can be priceless. For more information on Roge Ruiz, view our Frequently Asked Questions (FAQs) at [email or web address]. Sincerely, 
 
Dominguez Stauffer MD 
Chief Medical Officer [address] *:  certain medications cannot be prescribed via Roge Ruiz Unresulted Labs-Please follow up with your PCP about these lab tests Order Current Status NC XR TECHNOLOGIST SERVICE In process Providers Seen During Your Hospitalization Provider Specialty Primary office phone Dain Art MD Gastroenterology [phone] Your Primary Care Physician (PCP) Primary Care Physician Office Phone Office Fax [address] [facility], [address] [phone] You are allergic to the following Allergen Reactions Hydrocodone Itching Recent Documentation Height Weight BMI Smoking Status 1.829 m 73 kg 21.84 kg/m2 Former Smoker Emergency Contacts Name Discharge Info Relation Home Work Mobile Gwendolyn Sanchez CAREGIVER [3] Spouse [3] [phone] Patient Belongings The following personal items are in your possession at time of discharge: 
  Dental Appliances: None  Visual Aid: None Please provide this summary of care documentation to your next provider. Signatures-by signing, you are acknowledging that this After Visit Summary has been reviewed with you and you have received a copy. Patient Signature:  ____________________________________________________________ Date:  ____________________________________________________________  
  
Hodan Guan Provider Signature:  ____________________________________________________________ Date:  ____________________________________________________________

## 2018-04-12 NOTE — DISCHARGE INSTRUCTIONS
Gastrointestinal Esophagogastroduodenoscopy (EGD) - Upper Exam Discharge Instructions    1. Call Dr. Ulises Villafana at [phone] for any problems or questions. 2. Contact the doctor's office for follow up appointment as directed. 3. Medication may cause drowsiness for several hours, therefore, do not drive or operate machinery for remainder of the day. 4. No alcohol today. 5. Ordinarily, you may resume regular diet and activity after exam unless otherwise specified by your physician. 6. For mild soreness in your throat you may use Cepacol throat lozenges or warm salt-water gargles as needed. Any additional instructions: soft diet for the next 24 hours, repeat dilation in 4 months     Instructions given to Jess Adorno and other family members.   Instructions given by:

## 2018-04-12 NOTE — H&P
Gastrointestinal Progress Note    4/12/2018    Admit Date: 4/12/2018    Subjective:     Patient is NPO for an EGD/dilatation (dysphagia)    Pain: Patient complains of no abdominal pain. Bowel Movements: Normal    Bleeding:  None    No current facility-administered medications for this encounter. Objective:     Blood pressure 123/75, pulse 60, temperature 97 °F (36.1 °C), resp. rate 22, height 6' (1.829 m), weight 73 kg (161 lb), SpO2 100 %. EXAM:  HEART: regular rate and rhythm, S1, S2 normal, no murmur, click, rub or gallop, LUNGS: chest clear, no wheezing, rales, normal symmetric air entry, Heart exam - S1, S2 normal, no murmur, no gallop, rate regular and ABDOMEN:  Bowel sounds are normal, liver is not enlarged, spleen is not enlarged    Data Review  No results found for this or any previous visit (from the past 24 hour(s)). Assessment:     Active Problems:   Dysphagia with a history of radiation stricture  Hyperlipidemia      Plan:     EGD/dilatation--risks (bleed, perforation, infection, untoward CV or resp. Event, mortality, etc.), benefits and alternatives were discussed with the patient who agrees to proceed.   Kimani Downs MD

## 2018-04-13 NOTE — PROCEDURES
Edwardo Murphy 134  PROCEDURE NOTE    Edilia Bonilla  MR#: [id number]  : 1950  ACCOUNT #: [de-identified]   DATE OF SERVICE: 2018    SUBJECTIVE:  A 68-year-old male is undergoing upper endoscopy because of dysphagia. Upper endoscopy is being done to document the presence or absence of outlet obstruction or other source of the above as well as to dilate the known proximal esophageal stricture. Risks (bleeding, perforation, infection, untoward cardiovascular or respiratory event or mortality) were discussed in detail with the patient, who agrees to proceed. ANESTHESIA:  MAC anesthesia. INSTRUMENT:  GIF-H190 videoscope. DESCRIPTION OF PROCEDURE:  The videoscope was passed through the hypopharynx with minimal difficulty. We actually advanced the endoscope to the left of the midline through the cricopharyngeus into the esophagus. Proximal, mid and distal esophagus was normal except for proximal esophageal stricture, just distal to the cricopharyngeus. Once inside the stomach, the body and antrum were grossly unremarkable. Retroflex view of the angularis was normal.  Retroflex view of the EG junction and cardia revealed no abnormalities. Attention was turned to the duodenum. Duodenal bulb and C-loop were normal.  No active ulcer disease was seen. The endoscope was backed in the stomach. Under fluoroscopy, a guidewire was advanced into the gastric lumen and the endoscope was removed from the patient, keeping the guidewire in good position. Over the guidewire, #12, 14 and 16 Savary dilators (36, 42 and 48-Omani) were serially passed over the guidewire into the gastric lumen, keeping the guidewire in good position under fluoroscopy. After the 16 Savary had been passed through the EG junction, both dilator and guidewire were removed from the patient. The endoscope was then advanced into the hypopharynx and esophagus.   There was minimal trauma to the cricopharyngeal area. The remaining esophagus and stomach were unremarkable. The endoscope was backed out of the stomach and out of the patient. The vocal cords appeared normal.  The patient tolerated the procedure well, remained in the endoscopy suite in stable condition. IMPRESSION  Proximal esophageal stricture, dilated as described above (Savary dilators with fluoroscopic assistance). PLAN:  THERAPEUTIC:  Followup dilatation in approximately 4 months.       MD CHANTAL Mccullough / Cb Rae  D: 04/12/2018 13:12     T: 04/12/2018 14:54  JOB #: [id number]  CC: Marta Millan MD  [address] [place] [address] [address]  CC: Madai Rojas MD

## 2018-09-11 ENCOUNTER — ANESTHESIA EVENT (OUTPATIENT)
Dept: ENDOSCOPY | Age: 68
End: 2018-09-11
Payer: MEDICARE

## 2018-09-12 ENCOUNTER — HOSPITAL ENCOUNTER (OUTPATIENT)
Age: 68
Setting detail: OUTPATIENT SURGERY
Discharge: HOME | End: 2018-09-12
Attending: INTERNAL MEDICINE | Admitting: INTERNAL MEDICINE
Payer: MEDICARE

## 2018-09-12 ENCOUNTER — APPOINTMENT (OUTPATIENT)
Dept: GENERAL RADIOLOGY | Age: 68
End: 2018-09-12
Attending: INTERNAL MEDICINE
Payer: MEDICARE

## 2018-09-12 ENCOUNTER — ANESTHESIA (OUTPATIENT)
Dept: ENDOSCOPY | Age: 68
End: 2018-09-12
Payer: MEDICARE

## 2018-09-12 VITALS
WEIGHT: 160 LBS | SYSTOLIC BLOOD PRESSURE: 133 MMHG | BODY MASS INDEX: 21.67 KG/M2 | HEIGHT: 72 IN | TEMPERATURE: 97.8 F | RESPIRATION RATE: 18 BRPM | OXYGEN SATURATION: 100 % | HEART RATE: 62 BPM | DIASTOLIC BLOOD PRESSURE: 75 MMHG

## 2018-09-12 PROCEDURE — 76060000031 HC ANESTHESIA FIRST 0.5 HR: Performed by: INTERNAL MEDICINE

## 2018-09-12 PROCEDURE — 74011250636 HC RX REV CODE- 250/636: Performed by: ANESTHESIOLOGY

## 2018-09-12 PROCEDURE — 76040000025: Performed by: INTERNAL MEDICINE

## 2018-09-12 PROCEDURE — 74011250636 HC RX REV CODE- 250/636

## 2018-09-12 RX ORDER — PROPOFOL 10 MG/ML
INJECTION, EMULSION INTRAVENOUS AS NEEDED
Status: DISCONTINUED | OUTPATIENT
Start: 2018-09-12 | End: 2018-09-12 | Stop reason: HOSPADM

## 2018-09-12 RX ORDER — PROPOFOL 10 MG/ML
INJECTION, EMULSION INTRAVENOUS
Status: DISCONTINUED | OUTPATIENT
Start: 2018-09-12 | End: 2018-09-12 | Stop reason: HOSPADM

## 2018-09-12 RX ORDER — SODIUM CHLORIDE, SODIUM LACTATE, POTASSIUM CHLORIDE, CALCIUM CHLORIDE 600; 310; 30; 20 MG/100ML; MG/100ML; MG/100ML; MG/100ML
100 INJECTION, SOLUTION INTRAVENOUS CONTINUOUS
Status: DISCONTINUED | OUTPATIENT
Start: 2018-09-12 | End: 2018-09-12 | Stop reason: HOSPADM

## 2018-09-12 RX ADMIN — SODIUM CHLORIDE, SODIUM LACTATE, POTASSIUM CHLORIDE, AND CALCIUM CHLORIDE: 600; 310; 30; 20 INJECTION, SOLUTION INTRAVENOUS at 12:53

## 2018-09-12 RX ADMIN — PROPOFOL 100 MG: 10 INJECTION, EMULSION INTRAVENOUS at 12:57

## 2018-09-12 RX ADMIN — PROPOFOL 140 MCG/KG/MIN: 10 INJECTION, EMULSION INTRAVENOUS at 12:57

## 2018-09-12 RX ADMIN — SODIUM CHLORIDE, SODIUM LACTATE, POTASSIUM CHLORIDE, AND CALCIUM CHLORIDE 100 ML/HR: 600; 310; 30; 20 INJECTION, SOLUTION INTRAVENOUS at 11:47

## 2018-09-12 NOTE — DISCHARGE INSTRUCTIONS
Gastrointestinal Esophagogastroduodenoscopy (EGD) - Upper Exam Discharge Instructions    1. Call Dr. Shanna Alicea at [phone] for any problems or questions. 2. Contact the doctor's office for follow up appointment as directed. 3. Medication may cause drowsiness for several hours, therefore, do not drive or operate machinery for remainder of the day. 4. No alcohol today. 5. Ordinarily, you may resume regular diet and activity after exam unless otherwise specified by your physician. 6. For mild soreness in your throat you may use throat lozenges or warm salt-water gargles as needed. Any additional instructions:   Follow up as needed

## 2018-09-12 NOTE — ANESTHESIA POSTPROCEDURE EVALUATION
Post-Anesthesia Evaluation and Assessment Patient: Ramila Magallanes MRN: [id number]  SSN: [id number] YOB: 1950  Age: 70 y.o. Sex: male Cardiovascular Function/Vital Signs Visit Vitals  /85  Pulse 71  Temp 36.6 °C (97.8 °F)  Resp 18  Ht 6' (1.829 m)  Wt 72.6 kg (160 lb)  SpO2 100%  BMI 21.7 kg/m2 Patient is status post total IV anesthesia anesthesia for Procedure(s): ESOPHAGOGASTRODUODENOSCOPY (EGD)/ 21/ DR REQUEST FLOURO 
ESOPHAGEAL DILATION. Nausea/Vomiting: None Postoperative hydration reviewed and adequate. Pain: 
Pain Scale 1: Numeric (0 - 10) (09/12/18 1319) Pain Intensity 1: 0 (09/12/18 1141) Managed Neurological Status: At baseline Mental Status and Level of Consciousness: Arousable Pulmonary Status:  
O2 Device: Room air (09/12/18 1319) Adequate oxygenation and airway patent Complications related to anesthesia: None Post-anesthesia assessment completed. No concerns Signed By: Jameson Salas MD   
 September 12, 2018

## 2018-09-12 NOTE — ANESTHESIA PREPROCEDURE EVALUATION
Anesthetic History No history of anesthetic complications Review of Systems / Medical History Patient summary reviewed and pertinent labs reviewed Pulmonary Within defined limits Neuro/Psych Within defined limits Cardiovascular Hyperlipidemia Exercise tolerance: >4 METS 
  
GI/Hepatic/Renal 
  
GERD: well controlled Endo/Other Hypothyroidism: well controlled Arthritis, cancer (base of tongue, s/p radiation) and anemia Other Findings Comments: Esophageal stricture Physical Exam 
 
Airway Mallampati: II 
TM Distance: 4 - 6 cm Neck ROM: normal range of motion Mouth opening: Normal 
 
 Cardiovascular Regular rate and rhythm,  S1 and S2 normal,  no murmur, click, rub, or gallop Rhythm: regular Rate: normal 
 
 
 
 Dental 
 
Dentition: Cuca Reeves Pulmonary Breath sounds clear to auscultation Abdominal 
GI exam deferred Other Findings Anesthetic Plan ASA: 2 Anesthesia type: total IV anesthesia Induction: Intravenous Anesthetic plan and risks discussed with: Patient

## 2018-09-12 NOTE — IP AVS SNAPSHOT
[address] [facility] 
 
 
 [address] 
[phone] Patient: Ramila Magallanes MRN: [id number] About your hospitalization You were admitted on:  September 12, 2018 You last received care in the:  [facility] You were discharged on:  September 12, 2018 Why you were hospitalized Your primary diagnosis was:  Not on File Follow-up Information Follow up With Details Comments Contact Info Kaz Hernandez MD   [address] [address] [facility] [address] 
[phone] Discharge Orders None A check aspen indicates which time of day the medication should be taken. My Medications CHANGE how you take these medications Instructions Each Dose to Equal  
 Morning Noon Evening Bedtime  
 valACYclovir 500 mg tablet Commonly known as:  VALTREX What changed:  See the new instructions. Your last dose was: Your next dose is: TAKE 1 TABLET TWICE DAILY CONTINUE taking these medications Instructions Each Dose to Equal  
 Morning Noon Evening Bedtime  
 aspirin delayed-release 81 mg tablet Your last dose was: Your next dose is: Take 81 mg by mouth nightly. Indications: prevention of transient ischemic attack, CONTINUED 81 mg  
    
   
   
   
  
 atorvastatin 10 mg tablet Commonly known as:  LIPITOR Your last dose was: Your next dose is: Take 1 Tab by mouth nightly. Indications: hypercholesterolemia 10 mg  
    
   
   
   
  
 calcium-vitamin D 600 mg(1,500mg) -200 unit Tab Your last dose was: Your next dose is: Take 0.5 Tabs by mouth two (2) times daily (with meals). Last taken 2/27/17  Indications: hold until after surgery. held since 3/4/16  
 0.5 Tab  
    
   
   
   
  
 celecoxib 200 mg capsule Commonly known as:  CELEBREX Your last dose was: Your next dose is: Take 1 Cap by mouth daily. 200 mg  
    
   
   
   
  
 cholecalciferol 1,000 unit Cap Commonly known as:  VITAMIN D3 Your last dose was: Your next dose is: Take 1,000 Units by mouth daily. Stopped 2/27/17  Indications: VITAMIN D DEFICIENCY, hold until after surgery. held since 7/19/16  
 1000 Units  
    
   
   
   
  
 cyclobenzaprine 5 mg tablet Commonly known as:  FLEXERIL Your last dose was: Your next dose is: Take 1 Tab by mouth two (2) times a day. 5 mg DULoxetine 60 mg capsule Commonly known as:  CYMBALTA Your last dose was: Your next dose is: TAKE 1 CAPSULE EVERY MORNING  FOR  NEUROPATHIC  PAIN  
     
   
   
   
  
 gabapentin 300 mg capsule Commonly known as:  NEURONTIN Your last dose was: Your next dose is: Take 1 Cap by mouth Daily (before dinner). 300 mg  
    
   
   
   
  
 levothyroxine 25 mcg tablet Commonly known as:  SYNTHROID Your last dose was: Your next dose is: Take  by mouth Daily (before breakfast). lutein 6 mg Tab Your last dose was: Your next dose is: Take 20 mg by mouth daily. 20 mg  
    
   
   
   
  
 MULTIPLE VITAMINS PO Your last dose was: Your next dose is: Take 0.5 Tabs by mouth two (2) times a day. 0.5 Tab  
    
   
   
   
  
 omega-3 fatty acids-vitamin e 1,000 mg Cap Your last dose was: Your next dose is: Take 1 Cap by mouth two (2) times a day. 1 Cap  
    
   
   
   
  
 omeprazole 20 mg capsule Commonly known as:  PRILOSEC Your last dose was: Your next dose is: TAKE 1 CAPSULE EVERY MORNING  
     
   
   
   
  
 RESTASIS 0.05 % ophthalmic emulsion Generic drug:  cycloSPORINE Your last dose was: Your next dose is:    
   
   
      
   
   
   
  
  
  
  
Discharge Instructions Gastrointestinal Esophagogastroduodenoscopy (EGD) - Upper Exam Discharge Instructions 1. Call Dr. Eyad Cordova at [phone] for any problems or questions. 2. Contact the doctor's office for follow up appointment as directed. 3. Medication may cause drowsiness for several hours, therefore, do not drive or operate machinery for remainder of the day. 4. No alcohol today. 5. Ordinarily, you may resume regular diet and activity after exam unless otherwise specified by your physician. 6. For mild soreness in your throat you may use throat lozenges or warm salt-water gargles as needed. Any additional instructions: Follow up as needed Introducing [facility] SERVICES! Dear Brisa Saldana: Thank you for requesting a [employer] account. Our records indicate that you already have an active [employer] account. You can access your account anytime at [email or web address] Did you know that you can access your hospital and ER discharge instructions at any time in [employer]? You can also review all of your test results from your hospital stay or ER visit. Additional Information If you have questions, please visit the Frequently Asked Questions section of the [employer] website at [email or web address] Remember, [employer] is NOT to be used for urgent needs. For medical emergencies, dial 911. Now available from your iPhone and Android! Introducing Roge Ruiz As a [facility] patient, I wanted to make you aware of our electronic visit tool called Roge Ruiz. [facility] 24/7 allows you to connect within minutes with a medical provider 24 hours a day, seven days a week via a mobile device or tablet or logging into a secure website from your computer.   You can access [facility] [employer] 24/7 from anywhere in the [place]. A virtual visit might be right for you when you have a simple condition and feel like you just dont want to get out of bed, or cant get away from work for an appointment, when your regular [facility] provider is not available (evenings, weekends or holidays), or when youre out of town and need minor care. Electronic visits cost only $49 and if the [employer] 24/7 provider determines a prescription is needed to treat your condition, one can be electronically transmitted to a nearby pharmacy*. Please take a moment to enroll today if you have not already done so. The enrollment process is free and takes just a few minutes. To enroll, please download the [employer] 24/7 poly to your tablet or phone, or visit [email or web address] to enroll on your computer. And, as an [address] [place] [address] patient with a [employer] account, the results of your visits will be scanned into your electronic medical record and your primary care provider will be able to view the scanned results. We urge you to continue to see your regular [facility] provider for your ongoing medical care. And while your primary care provider may not be the one available when you seek a [employer] virtual visit, the peace of mind you get from getting a real diagnosis real time can be priceless. For more information on [employer], view our Frequently Asked Questions (FAQs) at [email or web address]. Sincerely, 
 
Noé Levy MD 
Chief Medical Officer [facility] Leonor Torres *:  certain medications cannot be prescribed via [employer] Unresulted Labs-Please follow up with your PCP about these lab tests Order Current Status NC XR TECHNOLOGIST SERVICE In process Providers Seen During Your Hospitalization Provider Specialty Primary office phone Jay Best MD Gastroenterology [phone] Your Primary Care Physician (PCP) Primary Care Physician Office Phone Office Fax [address], [address] You are allergic to the following Allergen Reactions Hydrocodone Itching Recent Documentation Height Weight BMI Smoking Status 1.829 m 72.6 kg 21.7 kg/m2 Former Smoker Emergency Contacts Name Discharge Info Relation Home Work Mobile Irish Parikh CAREGIVER [3] Spouse [3] [phone] Patient Belongings The following personal items are in your possession at time of discharge: 
  Dental Appliances: None  Visual Aid: None Please provide this summary of care documentation to your next provider. Signatures-by signing, you are acknowledging that this After Visit Summary has been reviewed with you and you have received a copy. Patient Signature:  ____________________________________________________________ Date:  ____________________________________________________________  
  
Dwight Griffin Provider Signature:  ____________________________________________________________ Date:  ____________________________________________________________

## 2018-09-12 NOTE — PROCEDURES
Edwardo Murphy 134  PROCEDURE NOTE    Dianelys Benson  MR#: [id number]  : 1950  ACCOUNT #: [de-identified]   DATE OF SERVICE: 2018    PROCEDURE:  Esophagogastroduodenoscopy     SUBJECTIVE:  A 69-year-old male is undergoing upper endoscopy because of dysphagia associated with proximal esophageal (radiation stricture). Upper endoscopy done today to document the presence or absence or any other sources of dysphagia and proceed with a wire-guided Savary dilatation. The risks (bleeding, perforation, infection, untoward cardiovascular risk, respiratory events, mortality), benefits and alternatives were discussed in detail with the patient who agrees to proceed. ANESTHESIA:  As per MAC anesthesia. INSTRUMENT:  [id number], video upper endoscopy. PROCEDURE:  The videoscope was passed through the hypopharynx with minimal difficulty in the esophagus. There was slightly scarred cricopharyngeus, but otherwise no mass was seen. Otherwise, the proximal, mid and distal esophagus were unremarkable. Once in the stomach,  the body, antrum was unremarkable except for prepyloric erythema. A retroflex view of the angularis was normal.  A retroflexed view EG junction and cardia revealed no evidence of specific abnormalities. Attention was turned to the duodenum. Duodenal bulb and C loop were normal.  The endoscope was backed into the stomach where air was decompressed. Fluoroscopy was used at this point in time as we advanced the guidewire through the endoscope into the gastric lumen. A fluoroscopy was used to maintain good position of the guidewire. The endoscope was removed from the patient. Over the guidewire, numbers 12 14, 16 Savary dilators were serially passed using fluoroscopy to maintain good position of the guidewire as the dilators were advanced through the esophagus and stomach.   Once the 16 dilator had passed, both dilator and guidewire were removed from the patient. The endoscope was then advanced back through the hypopharynx and esophagus. There was some minor trauma to the cricopharyngeus related to the stricture that was dilated. Otherwise, the proximal, mid and distal esophagus as well as the gastric lumen were unremarkable. The endoscope was passed out of the patient. There was no significant trauma to the esophagus noted. There was minimal heme in the area of the cricopharyngeus. The patient tolerated the scope and went to recovery area in stable condition. IMPRESSION:  1. Proximal radiation stricture -- dilated with Savary dilator under fluoroscopy as described above without difficulty. 2.  Mild gastritis. PLAN:      DIAGNOSTICS:    1. Ask the patient to continue PPI therapy. 2.  Followup dilatation in 4 months unless necessary to be done before that time.       Tang Montes De Oca MD       CHANTAL / VN  D: 09/12/2018 13:22     T: 09/12/2018 14:16  JOB #: [id number]  CC: Juan Kellogg MD  CC: Jac Padilla MD  [address] [facility]  [place], [address]  [address]

## 2018-09-12 NOTE — ROUTINE PROCESS
Pt. Discharged to car by Colonel Barber with family . Vital signs stable. Able to tolerate PO fluids. Passing gas.  Seen by MD.

## 2018-09-12 NOTE — H&P
Gastrointestinal Progress Note 9/12/2018 Admit Date: 9/12/2018 Subjective:  
 
Patient is NPO for an EGD/dilatation Pain: Patient complains of no abdominal pain. Bowel Movements: Normal 
 
Bleeding:  None Current Facility-Administered Medications Medication Dose Route Frequency  lactated Ringers infusion  100 mL/hr IntraVENous CONTINUOUS Objective:  
 
Blood pressure 145/81, pulse 69, temperature 97.8 °F (36.6 °C), resp. rate 18, height 6' (1.829 m), weight 72.6 kg (160 lb), SpO2 100 %. EXAM:  HEART: regular rate and rhythm, S1, S2 normal, no murmur, click, rub or gallop, LUNGS: chest clear, no wheezing, rales, normal symmetric air entry, Heart exam - S1, S2 normal, no murmur, no gallop, rate regular and ABDOMEN:  Bowel sounds are normal, liver is not enlarged, spleen is not enlarged Data Review  No results found for this or any previous visit (from the past 24 hour(s)). Assessment:  
 
Proximal radiation stricture Dysphagia Hyperlipidemia Plan:  
 
EGD/dilatation. Risks (bleed, perforation, infection, untoward CV or resp. Event, mortality, etc.), benefits and alternatives were discussed with the patient who agrees to proceed.   Megan Serrano MD

## 2019-01-18 ENCOUNTER — HOSPITAL ENCOUNTER (OUTPATIENT)
Age: 69
Setting detail: OUTPATIENT SURGERY
Discharge: HOME | End: 2019-01-18
Attending: INTERNAL MEDICINE | Admitting: INTERNAL MEDICINE
Payer: MEDICARE

## 2019-01-18 ENCOUNTER — ANESTHESIA (OUTPATIENT)
Dept: ENDOSCOPY | Age: 69
End: 2019-01-18
Payer: MEDICARE

## 2019-01-18 ENCOUNTER — APPOINTMENT (OUTPATIENT)
Dept: GENERAL RADIOLOGY | Age: 69
End: 2019-01-18
Attending: INTERNAL MEDICINE
Payer: MEDICARE

## 2019-01-18 ENCOUNTER — ANESTHESIA EVENT (OUTPATIENT)
Dept: ENDOSCOPY | Age: 69
End: 2019-01-18
Payer: MEDICARE

## 2019-01-18 VITALS
OXYGEN SATURATION: 100 % | TEMPERATURE: 98.4 F | RESPIRATION RATE: 12 BRPM | SYSTOLIC BLOOD PRESSURE: 121 MMHG | DIASTOLIC BLOOD PRESSURE: 73 MMHG | HEART RATE: 64 BPM

## 2019-01-18 PROCEDURE — 76060000032 HC ANESTHESIA 0.5 TO 1 HR: Performed by: INTERNAL MEDICINE

## 2019-01-18 PROCEDURE — 74011250636 HC RX REV CODE- 250/636: Performed by: INTERNAL MEDICINE

## 2019-01-18 PROCEDURE — 74011250636 HC RX REV CODE- 250/636

## 2019-01-18 PROCEDURE — 76040000025: Performed by: INTERNAL MEDICINE

## 2019-01-18 RX ORDER — SODIUM CHLORIDE, SODIUM LACTATE, POTASSIUM CHLORIDE, CALCIUM CHLORIDE 600; 310; 30; 20 MG/100ML; MG/100ML; MG/100ML; MG/100ML
1000 INJECTION, SOLUTION INTRAVENOUS CONTINUOUS
Status: DISCONTINUED | OUTPATIENT
Start: 2019-01-18 | End: 2019-01-18 | Stop reason: HOSPADM

## 2019-01-18 RX ORDER — PROPOFOL 10 MG/ML
INJECTION, EMULSION INTRAVENOUS AS NEEDED
Status: DISCONTINUED | OUTPATIENT
Start: 2019-01-18 | End: 2019-01-18 | Stop reason: HOSPADM

## 2019-01-18 RX ORDER — PROPOFOL 10 MG/ML
INJECTION, EMULSION INTRAVENOUS
Status: DISCONTINUED | OUTPATIENT
Start: 2019-01-18 | End: 2019-01-18 | Stop reason: HOSPADM

## 2019-01-18 RX ADMIN — PROPOFOL 30 MG: 10 INJECTION, EMULSION INTRAVENOUS at 13:16

## 2019-01-18 RX ADMIN — SODIUM CHLORIDE, SODIUM LACTATE, POTASSIUM CHLORIDE, AND CALCIUM CHLORIDE: 600; 310; 30; 20 INJECTION, SOLUTION INTRAVENOUS at 13:02

## 2019-01-18 RX ADMIN — PROPOFOL 100 MCG/KG/MIN: 10 INJECTION, EMULSION INTRAVENOUS at 13:14

## 2019-01-18 RX ADMIN — PROPOFOL 50 MG: 10 INJECTION, EMULSION INTRAVENOUS at 13:14

## 2019-01-18 NOTE — ROUTINE PROCESS
VSS. No complaints noted. Education given and reviewed with wife who voiced understanding. Pt wheeled out via wheelchair by Melva Gibson.

## 2019-01-18 NOTE — H&P
Gastrointestinal Progress Note 1/18/2019 Admit Date: 1/18/2019 Subjective:  
 
Patient is NPO for an EGD/dilatation (history of a radiation stricture). Pain: Patient complains of no abdominal pain. Bowel Movements: Normal 
 
Bleeding:  None Current Facility-Administered Medications Medication Dose Route Frequency  lactated Ringers infusion 1,000 mL  1,000 mL IntraVENous CONTINUOUS Objective:  
 
Blood pressure 118/79, pulse 69, temperature 97.9 °F (36.6 °C), resp. rate 16, SpO2 100 %. No intake/output data recorded. No intake/output data recorded. EXAM:  HEART: regular rate and rhythm, S1, S2 normal, no murmur, click, rub or gallop, LUNGS: chest clear, no wheezing, rales, normal symmetric air entry, Heart exam - S1, S2 normal, no murmur, no gallop, rate regular and ABDOMEN:  Bowel sounds are normal, liver is not enlarged, spleen is not enlarged Data Review  No results found for this or any previous visit (from the past 24 hour(s)). Assessment:  
 
Active Problems: Dsyphagia associated with a proximal radiation stricture Hypothyroidism Hyperlipidemia Plan: EGD with dilatation. Risks (bleed, perforation, infection, untoward CV or resp. Event, mortality, etc.), benefits, and alternatives were discussed with the patient who agrees to proceed.    Loc Pedroza MD

## 2019-01-18 NOTE — ANESTHESIA PREPROCEDURE EVALUATION
Anesthetic History Review of Systems / Medical History Patient summary reviewed Pulmonary Neuro/Psych Comments: Neuropathy Cardiovascular Hyperlipidemia GI/Hepatic/Renal 
  
GERD Comments: Dysphagia Esophageal stricture Endo/Other Hypothyroidism Cancer (Tongue) Other Findings Physical Exam 
 
Airway Mallampati: II 
TM Distance: > 6 cm Neck ROM: normal range of motion Mouth opening: Normal 
 
 Cardiovascular Regular rate and rhythm,  S1 and S2 normal,  no murmur, click, rub, or gallop Dental 
No notable dental hx Pulmonary Breath sounds clear to auscultation Abdominal 
 
 
 
 Other Findings Anesthetic Plan ASA: 2 Anesthesia type: total IV anesthesia Anesthetic plan and risks discussed with: Patient

## 2019-01-18 NOTE — ANESTHESIA POSTPROCEDURE EVALUATION
Procedure(s): ESOPHAGOGASTRODUODENOSCOPY (EGD) BMI 21 ESOPHAGEAL DILATION. Anesthesia Post Evaluation Patient location during evaluation: PACU Patient participation: complete - patient participated Level of consciousness: awake and alert Pain management: adequate Airway patency: patent Anesthetic complications: no 
Cardiovascular status: acceptable Respiratory status: acceptable Hydration status: acceptable Post anesthesia nausea and vomiting:  none Visit Vitals /77 Pulse 66 Temp 36.9 °C (98.4 °F) Resp 12 SpO2 100%

## 2019-01-18 NOTE — DISCHARGE INSTRUCTIONS
Gastrointestinal Esophagogastroduodenoscopy (EGD) - Upper Exam Discharge Instructions    1. Call Dr. Cesilia Smith at [phone] for any problems or questions. 2. Contact the doctor's office for follow up appointment as directed. 3. Medication may cause drowsiness for several hours, therefore, do not drive or operate machinery for remainder of the day. 4. No alcohol today. 5. Ordinarily, you may resume regular diet and activity after exam unless otherwise specified by your physician. 6. For mild soreness in your throat you may use Cepacol throat lozenges or warm salt-water gargles as needed. Any additional instructions: repeat EGD in 4 months     Instructions given to Ariadna Vogt and other family members.

## 2019-01-19 NOTE — PROCEDURES
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 Upper endoscopy was done today to document the source of dysphagia and to proceed with Savary dilatation under fluoroscopy. Risks (bleeding, perforation, infection, untoward cardiovascular risk, mortality), benefits and alternatives were discussed in detail with the patient who agrees to proceed. ANESTHESIA:  As per MAC anesthesia. INSTRUMENT:  GIF-H190 upper endoscope as well as Savary dilator. The patient has had the videoscope passed through the hypopharynx and esophagus with minimal difficulty. Proximal, mid and distal esophagus were unremarkable except for a subtle proximal esophageal stricture, just distal to the cricopharyngeus. In this, the blood vessels in the hypopharyngeal area were ectatic. There was a small hiatal hernia sac through which the endoscope was passed without difficulty. Once inside the stomach, the remaining body as well as antrum were unremarkable. A retroflexed view of the angularis was normal.  Retroflexed view of GE junction and cardia were with no other specific abnormalities. Then turned to the duodenum. Duodenal bulb and C-loop were normal.  The endoscope was passed back to the stomach. Under fluoroscopy, a guidewire was advanced and maintained in good position in the stomach as the endoscope was removed from the stomach and esophagus and out of the patient. Over the guidewire, Savary dilators were serially passed. Under fluoroscopy, numbers 12, 14, and 16 Savary dilators were passed.   Fluoroscopy was used to maintain good position of the guidewire as well as documented that the widest part of the dilator had advanced through the GE junction. Once the 16 had passed, both dilator and guidewire were removed from the patient. Post-dilatation,  a repeat endoscopy was performed. There was minimal mucosal trauma to the area just distal to the cricopharyngeus. However, the mucosa was intact. Otherwise, there was no significant mucosal disruption in the esophagus or stomach. The endoscope was then backed into the esophagus and out of the patient. The vocal cords appeared normal.  The patient tolerated and was taken to the recovery area in stable condition. IMPRESSION:  1. Proximal (radiation) stricture, just distal to the cricopharyngeus -- dilated with Savary dilators as described above. 2.  Hiatal hernia sac. 3.  Fluoroscopy was used during the procedure. PLAN/THERAPY:  1. Followup dilatation in approximately 4 months. 2.  Continue current therapy.         MD CHANTAL Benjamin / [place]  D: 01/18/2019 13:45     T: 01/18/2019 17:24  JOB #: [id number]  CC: Kendell MANE MD  CC: Sergio Knight MD  [address] [facility]  [place], [address]  [address]

## 2019-04-30 ENCOUNTER — ANESTHESIA EVENT (OUTPATIENT)
Dept: ENDOSCOPY | Age: 69
End: 2019-04-30
Payer: MEDICARE

## 2019-05-01 ENCOUNTER — APPOINTMENT (OUTPATIENT)
Dept: GENERAL RADIOLOGY | Age: 69
End: 2019-05-01
Attending: INTERNAL MEDICINE
Payer: MEDICARE

## 2019-05-01 ENCOUNTER — ANESTHESIA (OUTPATIENT)
Dept: ENDOSCOPY | Age: 69
End: 2019-05-01
Payer: MEDICARE

## 2019-05-01 ENCOUNTER — HOSPITAL ENCOUNTER (OUTPATIENT)
Age: 69
Setting detail: OUTPATIENT SURGERY
Discharge: HOME | End: 2019-05-01
Attending: INTERNAL MEDICINE | Admitting: INTERNAL MEDICINE
Payer: MEDICARE

## 2019-05-01 VITALS
HEART RATE: 58 BPM | RESPIRATION RATE: 14 BRPM | DIASTOLIC BLOOD PRESSURE: 76 MMHG | TEMPERATURE: 98.1 F | BODY MASS INDEX: 21.94 KG/M2 | WEIGHT: 162 LBS | SYSTOLIC BLOOD PRESSURE: 117 MMHG | OXYGEN SATURATION: 99 % | HEIGHT: 72 IN

## 2019-05-01 PROCEDURE — 74011250636 HC RX REV CODE- 250/636

## 2019-05-01 PROCEDURE — 76040000025: Performed by: INTERNAL MEDICINE

## 2019-05-01 PROCEDURE — 74011250636 HC RX REV CODE- 250/636: Performed by: ANESTHESIOLOGY

## 2019-05-01 PROCEDURE — 76060000032 HC ANESTHESIA 0.5 TO 1 HR: Performed by: INTERNAL MEDICINE

## 2019-05-01 RX ORDER — LIDOCAINE HYDROCHLORIDE 20 MG/ML
INJECTION, SOLUTION EPIDURAL; INFILTRATION; INTRACAUDAL; PERINEURAL AS NEEDED
Status: DISCONTINUED | OUTPATIENT
Start: 2019-05-01 | End: 2019-05-01 | Stop reason: HOSPADM

## 2019-05-01 RX ORDER — PROPOFOL 10 MG/ML
INJECTION, EMULSION INTRAVENOUS
Status: DISCONTINUED | OUTPATIENT
Start: 2019-05-01 | End: 2019-05-01 | Stop reason: HOSPADM

## 2019-05-01 RX ORDER — SODIUM CHLORIDE, SODIUM LACTATE, POTASSIUM CHLORIDE, CALCIUM CHLORIDE 600; 310; 30; 20 MG/100ML; MG/100ML; MG/100ML; MG/100ML
75 INJECTION, SOLUTION INTRAVENOUS CONTINUOUS
Status: DISCONTINUED | OUTPATIENT
Start: 2019-05-01 | End: 2019-05-01 | Stop reason: HOSPADM

## 2019-05-01 RX ORDER — PROPOFOL 10 MG/ML
INJECTION, EMULSION INTRAVENOUS AS NEEDED
Status: DISCONTINUED | OUTPATIENT
Start: 2019-05-01 | End: 2019-05-01 | Stop reason: HOSPADM

## 2019-05-01 RX ADMIN — PROPOFOL 40 MG: 10 INJECTION, EMULSION INTRAVENOUS at 13:18

## 2019-05-01 RX ADMIN — PROPOFOL 200 MCG/KG/MIN: 10 INJECTION, EMULSION INTRAVENOUS at 13:18

## 2019-05-01 RX ADMIN — SODIUM CHLORIDE, SODIUM LACTATE, POTASSIUM CHLORIDE, AND CALCIUM CHLORIDE 75 ML/HR: 600; 310; 30; 20 INJECTION, SOLUTION INTRAVENOUS at 12:49

## 2019-05-01 RX ADMIN — LIDOCAINE HYDROCHLORIDE 60 MG: 20 INJECTION, SOLUTION EPIDURAL; INFILTRATION; INTRACAUDAL; PERINEURAL at 13:18

## 2019-05-01 NOTE — DISCHARGE INSTRUCTIONS
Gastrointestinal Esophagogastroduodenoscopy (EGD) - Upper Exam Discharge Instructions    1. Call Neri at [phone] for any problems or questions. 2. Contact the doctor's office for follow up appointment as directed. 3. Medication may cause drowsiness for several hours, therefore:  · Do not drive or operate machinery for remainder of the day. · No alcohol today. · Do not make any important or legal decisions for 24 hours. · Do not sign any legal documents for 24 hours. 5. Ordinarily, you may resume regular diet and activity after exam unless otherwise specified by your physician. 6. For mild soreness in your throat you may use Cepacol throat lozenges or warm salt-water gargles as needed. Instructions given to Yvrose Loco and other family members.   Instructions given by:  Slime De Los Santos RN

## 2019-05-01 NOTE — H&P
Gastrointestinal Progress Note 5/1/2019 Admit Date: 5/1/2019 Subjective:  
 
Patient is NPO for an EGD/dilatation. Pain: Patient complains of no abdominal pain. Bowel Movements: Normal 
 
Bleeding:  None Current Facility-Administered Medications Medication Dose Route Frequency  lactated Ringers infusion  75 mL/hr IntraVENous CONTINUOUS Objective:  
 
Blood pressure 103/76, pulse 64, temperature 98.1 °F (36.7 °C), resp. rate 18, height 6' (1.829 m), weight 73.5 kg (162 lb), SpO2 96 %. No intake/output data recorded. No intake/output data recorded. EXAM:  HEART: regular rate and rhythm, S1, S2 normal, no murmur, click, rub or gallop, LUNGS: chest clear, no wheezing, rales, normal symmetric air entry, Heart exam - S1, S2 normal, no murmur, no gallop, rate regular and ABDOMEN:  Bowel sounds are normal, liver is not enlarged, spleen is not enlarged Data Review  No results found for this or any previous visit (from the past 24 hour(s)). Assessment:  
 
Active Problems: Dysphagia associated with a proximal esophageal stricture History of SCC of the tongue Hyperlipidemia Plan: EGD with dilatation. Risks (bleed, perforation, infection, untoward CV or resp. Event, mortality, etc.), benefits and alternatives were discussed with the patient who agrees to proceed.   Marceline Cranker, MD

## 2019-05-01 NOTE — ANESTHESIA POSTPROCEDURE EVALUATION
Procedure(s): ESOPHAGOGASTRODUODENOSCOPY (EGD) WITH DILATION/ 21/ DR REQUEST 
ESOPHAGEAL DILATION. total IV anesthesia Anesthesia Post Evaluation Multimodal analgesia: multimodal analgesia not used between 6 hours prior to anesthesia start to PACU discharge Patient location during evaluation: PACU Patient participation: complete - patient participated Level of consciousness: awake Pain management: adequate Airway patency: patent Anesthetic complications: no 
Cardiovascular status: acceptable Respiratory status: acceptable Hydration status: acceptable Post anesthesia nausea and vomiting:  none Vitals Value Taken Time /74 5/1/2019  1:53 PM  
Temp Pulse 63 5/1/2019  1:57 PM  
Resp 14 5/1/2019  1:48 PM  
SpO2 97 % 5/1/2019  1:57 PM  
Vitals shown include unvalidated device data.

## 2019-05-02 NOTE — PROCEDURES
[address] [facility]  PROCEDURE NOTE    Name:  John Beyer  MR#:  [id number]  :  1950  ACCOUNT #:  [de-identified]  DATE OF SERVICE:  2019    PREOPERATIVE DIAGNOSIS:  Proximal esophageal stricture associated with previous radiation therapy. POSTOPERATIVE DIAGNOSIS:  Proximal esophageal stricture associated with previous radiation therapy. PROCEDURES PERFORMED:  1. EGD with Savary dilatation. 2.  Fluoroscopy. SURGEON:  Luis Angel Reich. MD Neri    ASSISTANT:  None. ANESTHESIA:  MAC    ESTIMATED BLOOD LOSS:  None. SPECIMENS REMOVED:  None. COMPLICATIONS:  None. IMPLANTS:  None. SUBJECTIVE:  A 34-year-old male who is undergoing upper endoscopy with Savary dilatation because of proximal radiation stricture. Upper endoscopy is being done to document the source of the dysphagia and proceed with Savary dilatation under fluoroscopy. PROCEDURE IN DETAIL:  The patient was anesthetized. Videoscope was passed through the hypopharynx with minimal difficulty into the esophagus. Proximal, mid and distal esophagus were unremarkable. Once inside the stomach, the body and antrum were grossly unremarkable. A retroflexed view of the angularis was normal.  A retroflexed view of the angularis was normal.  A retroflexed view of the EG junction and cardia revealed no other specific abnormalities. It should be mentioned, in the hypopharynx there are ectatic vessels from previous radiation therapy, and just distal to the cricopharyngeus, there was a slight narrowing present. Attention was then turned to the duodenum. The duodenal bulb and C-loop were normal.  No active ulcer disease was seen. The endoscope was backed into stomach. Under fluoroscopy, a Savary guidewire was advanced through the endoscope and maintained in good position in the stomach as the endoscope was removed from the stomach.     Over the Savary guidewire, #12, 14 and 16 cm dilators were serially passed, maintaining good position of the guidewire as the dilators were passed through the hypopharynx into the esophagus and the stomach. After a 16 dilator (48-Thai) had passed, both dilator and guidewire were removed from the patient. The endoscope was advanced back into the hypopharynx and esophagus where there was no significant mucosal trauma present. The endoscope was advanced back in the stomach. No abnormalities were appreciated. The endoscope was backed, air was decompressed from the stomach. The endoscope was backed out of the esophagus and out of the patient. The vocal cords appeared normal.  The patient tolerated the procedure and remained in the fluoroscopy suite in stable condition. IMPRESSION:  1. Proximal radiation stricture that was dilated as described above. 2.  Savary dilatation performed under fluoroscopy. PLAN:  Therapeutic:  Follow up dilatation approximately three months. Continue current medications on which the patient is doing well.       Yue Bowman MD      [id number]  D:  05/01/2019 13:57  T:  05/01/2019 20:26  JOB #:  [id number]  CC:  MD Jim Quevedo MD

## 2019-08-25 ENCOUNTER — ANESTHESIA EVENT (OUTPATIENT)
Dept: ENDOSCOPY | Age: 69
End: 2019-08-25
Payer: MEDICARE

## 2019-08-26 ENCOUNTER — ANESTHESIA (OUTPATIENT)
Dept: ENDOSCOPY | Age: 69
End: 2019-08-26
Payer: MEDICARE

## 2019-08-26 ENCOUNTER — HOSPITAL ENCOUNTER (OUTPATIENT)
Age: 69
Setting detail: OUTPATIENT SURGERY
Discharge: HOME | End: 2019-08-26
Attending: INTERNAL MEDICINE | Admitting: INTERNAL MEDICINE
Payer: MEDICARE

## 2019-08-26 ENCOUNTER — APPOINTMENT (OUTPATIENT)
Dept: GENERAL RADIOLOGY | Age: 69
End: 2019-08-26
Attending: INTERNAL MEDICINE
Payer: MEDICARE

## 2019-08-26 VITALS
DIASTOLIC BLOOD PRESSURE: 91 MMHG | RESPIRATION RATE: 12 BRPM | TEMPERATURE: 98.6 F | HEART RATE: 65 BPM | SYSTOLIC BLOOD PRESSURE: 128 MMHG | OXYGEN SATURATION: 100 %

## 2019-08-26 PROCEDURE — 74011250636 HC RX REV CODE- 250/636

## 2019-08-26 PROCEDURE — 74011250636 HC RX REV CODE- 250/636: Performed by: ANESTHESIOLOGY

## 2019-08-26 PROCEDURE — 76060000032 HC ANESTHESIA 0.5 TO 1 HR: Performed by: INTERNAL MEDICINE

## 2019-08-26 PROCEDURE — 76040000025: Performed by: INTERNAL MEDICINE

## 2019-08-26 RX ORDER — LIDOCAINE HYDROCHLORIDE 20 MG/ML
INJECTION, SOLUTION EPIDURAL; INFILTRATION; INTRACAUDAL; PERINEURAL AS NEEDED
Status: DISCONTINUED | OUTPATIENT
Start: 2019-08-26 | End: 2019-08-26 | Stop reason: HOSPADM

## 2019-08-26 RX ORDER — PROPOFOL 10 MG/ML
INJECTION, EMULSION INTRAVENOUS AS NEEDED
Status: DISCONTINUED | OUTPATIENT
Start: 2019-08-26 | End: 2019-08-26 | Stop reason: HOSPADM

## 2019-08-26 RX ORDER — SODIUM CHLORIDE, SODIUM LACTATE, POTASSIUM CHLORIDE, CALCIUM CHLORIDE 600; 310; 30; 20 MG/100ML; MG/100ML; MG/100ML; MG/100ML
100 INJECTION, SOLUTION INTRAVENOUS CONTINUOUS
Status: DISCONTINUED | OUTPATIENT
Start: 2019-08-26 | End: 2019-08-26 | Stop reason: HOSPADM

## 2019-08-26 RX ORDER — PROPOFOL 10 MG/ML
INJECTION, EMULSION INTRAVENOUS
Status: DISCONTINUED | OUTPATIENT
Start: 2019-08-26 | End: 2019-08-26 | Stop reason: HOSPADM

## 2019-08-26 RX ADMIN — LIDOCAINE HYDROCHLORIDE 30 MG: 20 INJECTION, SOLUTION EPIDURAL; INFILTRATION; INTRACAUDAL; PERINEURAL at 09:29

## 2019-08-26 RX ADMIN — PROPOFOL 50 MG: 10 INJECTION, EMULSION INTRAVENOUS at 09:28

## 2019-08-26 RX ADMIN — SODIUM CHLORIDE, SODIUM LACTATE, POTASSIUM CHLORIDE, AND CALCIUM CHLORIDE 100 ML/HR: 600; 310; 30; 20 INJECTION, SOLUTION INTRAVENOUS at 08:47

## 2019-08-26 RX ADMIN — PROPOFOL 160 MCG/KG/MIN: 10 INJECTION, EMULSION INTRAVENOUS at 09:28

## 2019-08-26 RX ADMIN — LIDOCAINE HYDROCHLORIDE 50 MG: 20 INJECTION, SOLUTION EPIDURAL; INFILTRATION; INTRACAUDAL; PERINEURAL at 09:28

## 2019-08-26 NOTE — ANESTHESIA POSTPROCEDURE EVALUATION
Procedure(s):  ESOPHAGOGASTRODUODENOSCOPY (EGD)/ King/  REQUEST FLOURO  ESOPHAGEAL DILATION. total IV anesthesia    Anesthesia Post Evaluation      Multimodal analgesia: multimodal analgesia used between 6 hours prior to anesthesia start to PACU discharge  Patient location during evaluation: bedside  Patient participation: complete - patient participated  Level of consciousness: awake and alert  Pain score: 3  Pain management: adequate  Airway patency: patent  Anesthetic complications: no  Cardiovascular status: acceptable and hemodynamically stable  Respiratory status: acceptable  Hydration status: acceptable  Post anesthesia nausea and vomiting:  none      Vitals Value Taken Time   BP     Temp     Pulse 69 8/26/2019  9:52 AM   Resp     SpO2 98 % 8/26/2019  9:52 AM   Vitals shown include unvalidated device data.

## 2019-08-26 NOTE — H&P
Gastrointestinal Progress Note    8/26/2019    Admit Date: 8/26/2019    Subjective:     Patient is NPO for an EGD/dilatation (dysphagia). Pain: Patient complains of no abdominal pain. Bowel Movements: Normal    Bleeding:  None    Current Facility-Administered Medications   Medication Dose Route Frequency    lactated Ringers infusion  100 mL/hr IntraVENous CONTINUOUS        Objective:     Blood pressure 138/81, pulse 71, temperature 97.7 °F (36.5 °C), resp. rate 16, SpO2 99 %. No intake/output data recorded. No intake/output data recorded. EXAM:  CV--RRR; Chest--clear to A and P; Abd--soft, non tender, positive bowel sounds. Data Review  No results found for this or any previous visit (from the past 24 hour(s)). Assessment:     Active Problems:  Dysphagia--s/p chemotherapy/surgery/radiation therapy for SCC of the tongue  Thyroid disease  Hyperlipidemia    Plan:     EGD with dilatation. Risks (bleed, perforation, infection, untoward CV or resp. Event, mortality, etc.), benefits and alternatives were discussed with the patient who agrees to proceed.   Tracy Shaw MD

## 2019-08-26 NOTE — ROUTINE PROCESS
Patient discharged. Tolerating po fluids. Belongings returned. No acute distress noted. Escorted to car, with family by Soumya Monsalve.

## 2019-08-26 NOTE — ANESTHESIA PREPROCEDURE EVALUATION
Anesthetic History   No history of anesthetic complications            Review of Systems / Medical History  Patient summary reviewed and pertinent labs reviewed    Pulmonary  Within defined limits                 Neuro/Psych   Within defined limits          Comments: Neuropathy Cardiovascular              Hyperlipidemia    Exercise tolerance: >4 METS     GI/Hepatic/Renal     GERD: well controlled          Comments: Dysphagia  Esophageal stricture Endo/Other      Hypothyroidism: well controlled  Cancer (Tongue)     Other Findings              Physical Exam    Airway  Mallampati: II  TM Distance: > 6 cm  Neck ROM: normal range of motion   Mouth opening: Normal     Cardiovascular  Regular rate and rhythm,  S1 and S2 normal,  no murmur, click, rub, or gallop             Dental  No notable dental hx       Pulmonary  Breath sounds clear to auscultation               Abdominal         Other Findings            Anesthetic Plan    ASA: 2  Anesthesia type: total IV anesthesia          Induction: Intravenous  Anesthetic plan and risks discussed with: Patient

## 2019-08-26 NOTE — DISCHARGE INSTRUCTIONS
Gastrointestinal Esophagogastroduodenoscopy (EGD) - Upper Exam Discharge Instructions    1. Call Dr. Amber Godwin at [phone] for any problems or questions. 2. Contact the doctor's office for follow up appointment as directed. 3. Medication may cause drowsiness for several hours, therefore:  · Do not drive or operate machinery for remainder of the day. · No alcohol today. · Do not make any important or legal decisions for 24 hours. · Do not sign any legal documents for 24 hours. 5. Ordinarily, you may resume regular diet and activity after exam unless otherwise specified by your physician. 6. For mild soreness in your throat you may use Cepacol throat lozenges or warm salt-water gargles as needed. Any additional instructions:    -Follow up for repeat dilation in 4 months. Instructions given to Shante Araujo and other family members.   Instructions given by:  Luz Marina Moore RN

## 2019-08-27 NOTE — OP NOTES
[address] [facility]  OPERATIVE REPORT    Name:  Aaron King  MR#:  [id number]  :  1950  ACCOUNT #:  [de-identified]  DATE OF SERVICE:  2019    PREOPERATIVE DIAGNOSIS:  Proximal esophageal stricture. POSTOPERATIVE DIAGNOSES:  Proximal esophageal stricture as well as gastritis. PROCEDURE PERFORMED:  Esophagogastroduodenoscopy - dilatation with Savary dilators. SURGEON:  None. PRIMARY GASTROENTEROLOGIST:  Bill Leon MD.    ASSISTANT:  None. ANESTHESIA:  MAC anesthesia. INSTRUMENT:  GIF-Q190 videoscope and Savary dilators. COMPLICATIONS:  None. SPECIMENS REMOVED:  none    IMPLANTS:  None. ESTIMATED BLOOD LOSS:  0 to 1 mL. SUBJECTIVE:  A 41-year-old male who presents with dysphagia. He has a history of radiation therapy in the distant past as well as surgery and chemotherapy for squamous cell carcinoma of tongue. Upper endoscopy was done today to attempt to improve the patient's swallowing. He has a proximal esophageal stricture present. Fluoroscopy is being used. Risks (bleeding, perforation, infection, untoward cardiovascular or respiratory event, and mortality) benefits, and alternatives were discussed in detail with the patient who agreed to proceed. PROCEDURE:  The patient was anesthetized and positioned and under fluoroscopy, THE videoscope was passed through the hypopharynx into the esophagus with minimal difficulty. Proximal, mid, and distal esophagus were unremarkable. Once inside the stomach, the body and antrum there were radiation changes to the hypopharynx present as well as subtle stricture at the cricopharyngeal area. Once inside the stomach, the body and antrum were unremarkable except for prepyloric erythema. A retroflexed view of the angularis was normal.  A retroflexed view of the EG junction and cardia revealed no other specific abnormalities. Attention then turned to the duodenum.   Duodenal bulb and C-loop normal.  No active ulcer disease was seen. The endoscope was backed into stomach. Under fluoroscopy, a Savary guidewire was advanced and maintained in good position in the antrum as the endoscope was removed from the patient. Over the guidewire, #12, 14, and 16 Savary dilators were serially passed and fluoroscopy was used to maintain good position of the guidewires as well as to document that the dilator was advancing in the correct position. Once the 16 had been passed, both dilator and guidewire were removed from the patient. We then re-advanced the endoscope through the hypopharynx and esophagus. There was minimal heme present at the cricopharyngeus indicating some disruption of the mucosa. The remaining esophagus as well as stomach revealed no other new abnormalities. The endoscope was backed into the stomach. Air was decompressed, moved back to the esophagus and off the patient. The vocal cords appeared normal.  The patient tolerated the procedure well and was taken to recovery area in stable condition. IMPRESSION:  1. Proximal esophageal stricture, dilated as described above. 2.  Mild gastritis. 3.  Dilatation performed under fluoroscopy without difficulty. PLAN:  Therapeutic:  1. PPI therapy. 2.  Repeat dilatation in the next three to four months per usual protocol.       Maida Schwartz MD      [id number]  D:  08/26/2019 10:11  T:  08/26/2019 13:18  JOB #:  [id number]  CC:  [facility]       Manuela Zee MD

## 2019-12-01 ENCOUNTER — ANESTHESIA EVENT (OUTPATIENT)
Dept: ENDOSCOPY | Age: 69
End: 2019-12-01
Payer: MEDICARE

## 2019-12-01 RX ORDER — SODIUM CHLORIDE 0.9 % (FLUSH) 0.9 %
5-40 SYRINGE (ML) INJECTION AS NEEDED
Status: CANCELLED | OUTPATIENT
Start: 2019-12-01

## 2019-12-01 RX ORDER — SODIUM CHLORIDE, SODIUM LACTATE, POTASSIUM CHLORIDE, CALCIUM CHLORIDE 600; 310; 30; 20 MG/100ML; MG/100ML; MG/100ML; MG/100ML
100 INJECTION, SOLUTION INTRAVENOUS CONTINUOUS
Status: CANCELLED | OUTPATIENT
Start: 2019-12-01

## 2019-12-01 RX ORDER — SODIUM CHLORIDE 0.9 % (FLUSH) 0.9 %
5-40 SYRINGE (ML) INJECTION EVERY 8 HOURS
Status: CANCELLED | OUTPATIENT
Start: 2019-12-01

## 2019-12-02 ENCOUNTER — HOSPITAL ENCOUNTER (OUTPATIENT)
Age: 69
Setting detail: OUTPATIENT SURGERY
Discharge: HOME | End: 2019-12-02
Attending: INTERNAL MEDICINE | Admitting: INTERNAL MEDICINE
Payer: MEDICARE

## 2019-12-02 ENCOUNTER — ANESTHESIA (OUTPATIENT)
Dept: ENDOSCOPY | Age: 69
End: 2019-12-02
Payer: MEDICARE

## 2019-12-02 ENCOUNTER — APPOINTMENT (OUTPATIENT)
Dept: GENERAL RADIOLOGY | Age: 69
End: 2019-12-02
Attending: INTERNAL MEDICINE
Payer: MEDICARE

## 2019-12-02 VITALS
RESPIRATION RATE: 18 BRPM | TEMPERATURE: 97.7 F | WEIGHT: 163 LBS | HEART RATE: 70 BPM | DIASTOLIC BLOOD PRESSURE: 81 MMHG | OXYGEN SATURATION: 100 % | BODY MASS INDEX: 22.08 KG/M2 | HEIGHT: 72 IN | SYSTOLIC BLOOD PRESSURE: 137 MMHG

## 2019-12-02 PROCEDURE — 74011000250 HC RX REV CODE- 250: Performed by: NURSE ANESTHETIST, CERTIFIED REGISTERED

## 2019-12-02 PROCEDURE — 76040000026: Performed by: INTERNAL MEDICINE

## 2019-12-02 PROCEDURE — 76060000033 HC ANESTHESIA 1 TO 1.5 HR: Performed by: INTERNAL MEDICINE

## 2019-12-02 PROCEDURE — 77030021593 HC FCPS BIOP ENDOSC BSC -A: Performed by: INTERNAL MEDICINE

## 2019-12-02 PROCEDURE — 88305 TISSUE EXAM BY PATHOLOGIST: CPT

## 2019-12-02 PROCEDURE — 74011250636 HC RX REV CODE- 250/636: Performed by: ANESTHESIOLOGY

## 2019-12-02 PROCEDURE — 87081 CULTURE SCREEN ONLY: CPT

## 2019-12-02 PROCEDURE — 88312 SPECIAL STAINS GROUP 1: CPT

## 2019-12-02 PROCEDURE — 74011250636 HC RX REV CODE- 250/636: Performed by: NURSE ANESTHETIST, CERTIFIED REGISTERED

## 2019-12-02 RX ORDER — SODIUM CHLORIDE, SODIUM LACTATE, POTASSIUM CHLORIDE, CALCIUM CHLORIDE 600; 310; 30; 20 MG/100ML; MG/100ML; MG/100ML; MG/100ML
100 INJECTION, SOLUTION INTRAVENOUS CONTINUOUS
Status: DISCONTINUED | OUTPATIENT
Start: 2019-12-02 | End: 2019-12-02 | Stop reason: HOSPADM

## 2019-12-02 RX ORDER — PROPOFOL 10 MG/ML
INJECTION, EMULSION INTRAVENOUS
Status: DISCONTINUED | OUTPATIENT
Start: 2019-12-02 | End: 2019-12-02 | Stop reason: HOSPADM

## 2019-12-02 RX ORDER — LIDOCAINE HYDROCHLORIDE 20 MG/ML
INJECTION, SOLUTION EPIDURAL; INFILTRATION; INTRACAUDAL; PERINEURAL AS NEEDED
Status: DISCONTINUED | OUTPATIENT
Start: 2019-12-02 | End: 2019-12-02 | Stop reason: HOSPADM

## 2019-12-02 RX ORDER — PROPOFOL 10 MG/ML
INJECTION, EMULSION INTRAVENOUS AS NEEDED
Status: DISCONTINUED | OUTPATIENT
Start: 2019-12-02 | End: 2019-12-02 | Stop reason: HOSPADM

## 2019-12-02 RX ADMIN — SODIUM CHLORIDE, SODIUM LACTATE, POTASSIUM CHLORIDE, AND CALCIUM CHLORIDE 100 ML/HR: 600; 310; 30; 20 INJECTION, SOLUTION INTRAVENOUS at 08:35

## 2019-12-02 RX ADMIN — PROPOFOL 40 MG: 10 INJECTION, EMULSION INTRAVENOUS at 09:45

## 2019-12-02 RX ADMIN — PROPOFOL 140 MCG/KG/MIN: 10 INJECTION, EMULSION INTRAVENOUS at 09:45

## 2019-12-02 RX ADMIN — LIDOCAINE HYDROCHLORIDE 40 MG: 20 INJECTION, SOLUTION EPIDURAL; INFILTRATION; INTRACAUDAL; PERINEURAL at 09:45

## 2019-12-02 NOTE — ANESTHESIA POSTPROCEDURE EVALUATION
Procedure(s):  ESOPHAGOGASTRODUODENOSCOPY (EGD) WITH DILATION/FLOURO  COLONOSCOPY/BMI 22  ESOPHAGEAL DILATION  ESOPHAGOGASTRODUODENAL (EGD) BIOPSY  ENDOSCOPIC POLYPECTOMY. total IV anesthesia    Anesthesia Post Evaluation      Multimodal analgesia: multimodal analgesia used between 6 hours prior to anesthesia start to PACU discharge  Patient location during evaluation: PACU  Patient participation: complete - patient participated  Level of consciousness: awake and alert  Pain management: adequate  Airway patency: patent  Anesthetic complications: no  Cardiovascular status: acceptable and hemodynamically stable  Respiratory status: acceptable  Hydration status: acceptable        Vitals Value Taken Time   /75 12/2/2019 10:48 AM   Temp     Pulse 81 12/2/2019 10:58 AM   Resp 18 12/2/2019 10:48 AM   SpO2 100 % 12/2/2019 10:58 AM   Vitals shown include unvalidated device data.

## 2019-12-02 NOTE — DISCHARGE INSTRUCTIONS
Gastrointestinal Esophagogastroduodenoscopy (EGD) - Upper Exam Discharge Instructions    1. Call Dr. Ayse Means at [phone] for any problems or questions. 2. Contact the doctor's office for follow up appointment as directed. 3. Medication may cause drowsiness for several hours, therefore:  · Do not drive or operate machinery for remainder of the day. · No alcohol today. · Do not make any important or legal decisions for 24 hours. · Do not sign any legal documents for 24 hours. 5. Ordinarily, you may resume regular diet and activity after exam unless otherwise specified by your physician. 6. For mild soreness in your throat you may use throat lozenges or warm salt-water gargles as needed. Any additional instructions: Follow up as needed       Gastrointestinal Colonoscopy/Flexible Sigmoidoscopy - Lower Exam Discharge Instructions  1. Call Dr. Ayse Means at [phone] for any problems or questions. 2. Contact the doctors office for follow up appointment as directed  3. Medication may cause drowsiness for several hours, therefore:  · Do not drive or operate machinery for reminder of the day. · No alcohol today. · Do not make any important or legal decisions for 24 hours. · Do not sign any legal documents for 24 hours. 4. Ordinarily, you may resume regular diet and activity after exam unless otherwise specified by your physician. 5. Because of air put into your colon during exam, you may experience some abdominal distension, relieved by the passage of gas, for several hours. 6. Contact your physician if you have any of the following:  · Excessive amount of bleeding - large amount when having a bowel movement. Occasional specks of blood with bowel movement would not be unusual.  · Severe abdominal pain  · Fever or Chills    Any additional instructions:   Follow up as needed

## 2019-12-02 NOTE — ANESTHESIA PREPROCEDURE EVALUATION
Relevant Problems   No relevant active problems   Anesthetic History   No history of anesthetic complications            Review of Systems / Medical History  Patient summary reviewed and pertinent labs reviewed    Pulmonary  Within defined limits                 Neuro/Psych   Within defined limits          Comments: Neuropathy Cardiovascular              Hyperlipidemia    Exercise tolerance: >4 METS     GI/Hepatic/Renal     GERD: well controlled          Comments: Dysphagia  Esophageal stricture Endo/Other      Hypothyroidism: well controlled  Cancer (Tongue)     Other Findings              Physical Exam    Airway  Mallampati: II  TM Distance: > 6 cm  Neck ROM: normal range of motion   Mouth opening: Normal     Cardiovascular  Regular rate and rhythm,  S1 and S2 normal,  no murmur, click, rub, or gallop             Dental  No notable dental hx       Pulmonary  Breath sounds clear to auscultation               Abdominal         Other Findings            Anesthetic Plan    ASA: 2  Anesthesia type: total IV anesthesia          Induction: Intravenous  Anesthetic plan and risks discussed with: Patient          Anesthetic History   No history of anesthetic complications            Review of Systems / Medical History  Patient summary reviewed and pertinent labs reviewed    Pulmonary  Within defined limits                 Neuro/Psych   Within defined limits          Comments: Neuropathy Cardiovascular              Hyperlipidemia    Exercise tolerance: >4 METS     GI/Hepatic/Renal     GERD: well controlled          Comments: Dysphagia  Esophageal stricture Endo/Other      Hypothyroidism: well controlled  Cancer (Tongue)     Other Findings              Physical Exam    Airway  Mallampati: II  TM Distance: > 6 cm  Neck ROM: normal range of motion   Mouth opening: Normal     Cardiovascular  Regular rate and rhythm,  S1 and S2 normal,  no murmur, click, rub, or gallop             Dental  No notable dental hx Pulmonary  Breath sounds clear to auscultation               Abdominal         Other Findings            Anesthetic Plan    ASA: 2  Anesthesia type: total IV anesthesia          Induction: Intravenous  Anesthetic plan and risks discussed with: Patient              Anesthesia Evaluation         Anesthesia Plan

## 2019-12-02 NOTE — ROUTINE PROCESS
Pt. Discharged to car by Albino Sheehan with family . Vital signs stable. Able to tolerate PO fluids. Passing gas.  Seen by MD.

## 2019-12-02 NOTE — PROGRESS NOTES
's Pre-procedure visit requested by patient. Conveyed care and concern for patient and family. Offered prayer as requested for patient, family, and staff.     Houston Galicia MDiv, BS  Board Certified

## 2019-12-02 NOTE — H&P
Gastrointestinal Progress Note    12/2/2019    Admit Date: 12/2/2019    Subjective:     Patient is NPO for an EGD and colonoscopy (dysphagia and Iron deficiency with need for a screening colonoscopy). Pain: Patient complains of no abdominal pain. Bowel Movements: Normal    Bleeding:  None    Current Facility-Administered Medications   Medication Dose Route Frequency    lactated Ringers infusion  100 mL/hr IntraVENous CONTINUOUS        Objective:     Blood pressure 122/76, pulse 75, temperature 97.7 °F (36.5 °C), resp. rate 16, height 6' (1.829 m), weight 73.9 kg (163 lb), SpO2 99 %. No intake/output data recorded. No intake/output data recorded. EXAM:  HEART: regular rate and rhythm, S1, S2 normal, no murmur, click, rub or gallop, LUNGS: chest clear, no wheezing, rales, normal symmetric air entry, Heart exam - S1, S2 normal, no murmur, no gallop, rate regular and ABDOMEN:  Bowel sounds are normal, liver is not enlarged, spleen is not enlarged    Data Review  No results found for this or any previous visit (from the past 24 hour(s)). Assessment:     Active Problems:  Dysphagia with history of a proximal esophageal (radiation) stricture. Need for colon screening  Iron deficiency anemia (resolved)  Hypothyroidism  Dyslipidemia  SCC of the tongue      Plan:     EGD and colonoscopy. Risks (bleed, perforation, infection, untoward CV or resp. Event, ,mortality, etc.), benefits, and alternatives were discussed with the patient who agrees to proceed.   Fidelia Latham MD

## 2019-12-03 LAB
H PYLORI AG TISS QL IMSTN: NEGATIVE
H PYLORI AG TISS QL IMSTN: NEGATIVE

## 2019-12-03 NOTE — OP NOTES
[address] [facility]  OPERATIVE REPORT    Name:  Giorgio Grand Marais  MR#:  [id number]  :  1950  ACCOUNT #:  [de-identified]  DATE OF SERVICE:  2019    PREOPERATIVE DIAGNOSES:  1.  Screening colonoscopy. 2.  Iron deficiency anemia. POSTOPERATIVE DIAGNOSES:  1. Colon polyps x2.  2.  Diverticulosis. 3.  Internal hemorrhoids. PROCEDURE PERFORMED:  Colonoscopy with cold biopsy of colon polyps. PRIMARY GASTROENTEROLOGIST:  Michael Franklin,     ASSISTANT:  None. ANESTHESIA:  As per MAC anesthesia. INSTRUMENT:  Pediatric (PCF-190) pediatric video colonoscope. COMPLICATIONS:  None. SPECIMENS REMOVED:  Sent to Pathology:  Colon polyps. IMPLANTS:  None. ESTIMATED BLOOD LOSS:  0-1 mL. SUBJECTIVE:  A 50-year-old male who is undergoing colonoscopy because of the need for screening colonoscopy as well as the fact that he does have recent diagnosis of iron-deficiency. Colonoscopy is done today to document the presence or absence of any inflammatory or neoplastic process involving the lower GI tract. PROCEDURE:  The patient was anesthetized and positioned, a rectal examination was performed which was unremarkable. The pediatric colonoscope was inserted into the rectal vault and the procedure was begun. Under direct visualization, the cecum and the ileocecal valve were identified. I could not advance the endoscope to the ileocecal valve . The endoscope was removed from the cecum. In the proximal ascending colon, there was a small 3-mm polyp that was cold biopsied away. The remaining ascending colon was normal.  The transverse, descending and sigmoid colons were remarkable for descending and sigmoid diverticular disease as well as a small 3-mm polyp in the distal sigmoid colon that was cold biopsied away. After documentation of hemostasis, the endoscope was backed out of the rectum where retroflexed view of the anal verge revealed small internal hemorrhoids.   The endoscope was removed from the patient. The patient tolerated the procedure well and taken to recovery area in stable condition. IMPRESSION:  1. Colon polyps x2 - biopsied away. 2.  Left side diverticulosis. 3.  Internal hemorrhoids. PLAN:  Diagnostic:  1. Follow up biopsy. 2.  Stool for FIT. 3.  CT or MRI enterography. 4.  Further recommendations pending above.         Khurram Phillips MD      MR/[id number]/  D:  12/02/2019 10:56  T:  12/02/2019 22:41  JOB #:  [id number]  CC:  [facility]       MD Basilia Butts MD

## 2019-12-03 NOTE — OP NOTES
[address] [place] [address]  OPERATIVE REPORT    Name:  Bob Nick  MR#:  [id number]  :  1950  ACCOUNT #:  [de-identified]  DATE OF SERVICE:  2019    PREOPERATIVE DIAGNOSES:  1. Dysphagia. 2.  Iron-deficiency anemia. POSTOPERATIVE DIAGNOSES:  1. Proximal esophageal radiation-induced stricture. 2.  Gastroduodenitis. PROCEDURE PERFORMED:  1. EGD with biopsy. 2.  EGD with ANGÉLICA test.  3.  EGD with Savary dilatation. 4.  Fluoroscopy. PRIMARY GASTROENTEROLOGIST:  Sammy Lakhani MD    SURGEON:  None. ASSISTANT:  None. ANESTHESIA:  Per MAC anesthesia. INSTRUMENT:  GIF-190 video endoscope and Savary dilators. COMPLICATIONS:  None. SPECIMENS REMOVED:  To laboratory:  1. Gastric biopsy. 2.  ANGÉLICA test.    IMPLANTS:  None. ESTIMATED BLOOD LOSS:  0-1 mL. PROCEDURE:  The patient was anesthetized and positioned, the video endoscope was passed through the hypopharynx and esophagus with minimal difficulty into the esophagus. There was a minimal structure at the cricopharyngeus. The GIF-190 video endoscope was passed without difficulty. Proximal, mid, and distal esophagus were unremarkable. Once inside the stomach, the body and antrum were remarkable for antral erosions and erythema. A retroflexed view of the angularis was normal.  A retroflexed view of the EG junction and cardia revealed no other specific abnormalities. Attention was then turned to the duodenum. Duodenal bulb was hyperemic. The duodenum and C-loop was normal.  The endoscope was backed into the stomach. Under fluoroscopy, a Savory guidewire was advanced and using fluoroscopy, we maintained good position as the endoscope was removed from the esophagus of the patient.     Over the guidewire, #12, #14, and $16 Savory dilators (36, 42, and 48) were serially passed using fluoroscopy to maintain good position of the guidewire and to document that the widest part of the dilator had advanced through the esophagus and into the stomach. Once the 48 had passed, the dilator and guidewire were removed from the patient. The endoscope was advanced back into the stomach through the hypopharynx and esophagus. Gastric biopsies and ANGÉLICA test were performed to exclude H. pylori. After documentation of hemostasis, the endoscope was backed to the stomach where air was decompressed, backed out of the esophagus and out of the patient. The vocal cords appeared normal.  The patient tolerated the procedure well and will remain in the fluoroscopy suite in stable condition. IMPRESSION:  1. Proximal  esophageal stricture - dilated as described above. 2.  Antral gastritis. 3.  Bulbar duodenitis. PLAN:  Diagnostic:  1. Follow up biopsy and ANGÉLICA test.  2.  PPI therapy. 3.  Stool for FIT. 4.  Proceed with colonoscopy now. Therapeutic:  Repeat dilatation on as needed basis.         Minal Huang MD      MR/[id number]_I/  D:  12/02/2019 10:53  T:  12/02/2019 22:26  JOB #:  [id number]  CC:  [facility]       MD Maria Del Rosario Oneill MD

## 2020-04-30 DIAGNOSIS — Z01.818 PREOP TESTING: Primary | ICD-10-CM

## 2020-05-02 LAB — SARS-COV-2, NAA: NOT DETECTED

## 2020-05-03 ENCOUNTER — ANESTHESIA EVENT (OUTPATIENT)
Dept: ENDOSCOPY | Age: 70
End: 2020-05-03
Payer: MEDICARE

## 2020-05-03 RX ORDER — SODIUM CHLORIDE, SODIUM LACTATE, POTASSIUM CHLORIDE, CALCIUM CHLORIDE 600; 310; 30; 20 MG/100ML; MG/100ML; MG/100ML; MG/100ML
100 INJECTION, SOLUTION INTRAVENOUS CONTINUOUS
Status: CANCELLED | OUTPATIENT
Start: 2020-05-03

## 2020-05-04 ENCOUNTER — HOSPITAL ENCOUNTER (OUTPATIENT)
Age: 70
Setting detail: OUTPATIENT SURGERY
Discharge: HOME | End: 2020-05-04
Attending: INTERNAL MEDICINE | Admitting: INTERNAL MEDICINE
Payer: MEDICARE

## 2020-05-04 ENCOUNTER — ANESTHESIA (OUTPATIENT)
Dept: ENDOSCOPY | Age: 70
End: 2020-05-04
Payer: MEDICARE

## 2020-05-04 ENCOUNTER — APPOINTMENT (OUTPATIENT)
Dept: GENERAL RADIOLOGY | Age: 70
End: 2020-05-04
Attending: INTERNAL MEDICINE
Payer: MEDICARE

## 2020-05-04 VITALS
SYSTOLIC BLOOD PRESSURE: 162 MMHG | HEART RATE: 70 BPM | DIASTOLIC BLOOD PRESSURE: 92 MMHG | TEMPERATURE: 98 F | RESPIRATION RATE: 16 BRPM | OXYGEN SATURATION: 100 %

## 2020-05-04 PROCEDURE — 74011250636 HC RX REV CODE- 250/636: Performed by: NURSE ANESTHETIST, CERTIFIED REGISTERED

## 2020-05-04 PROCEDURE — 74011000250 HC RX REV CODE- 250: Performed by: NURSE ANESTHETIST, CERTIFIED REGISTERED

## 2020-05-04 PROCEDURE — 76060000032 HC ANESTHESIA 0.5 TO 1 HR: Performed by: INTERNAL MEDICINE

## 2020-05-04 PROCEDURE — 74011250636 HC RX REV CODE- 250/636: Performed by: ANESTHESIOLOGY

## 2020-05-04 PROCEDURE — 76040000025: Performed by: INTERNAL MEDICINE

## 2020-05-04 RX ORDER — PROPOFOL 10 MG/ML
INJECTION, EMULSION INTRAVENOUS
Status: DISCONTINUED | OUTPATIENT
Start: 2020-05-04 | End: 2020-05-04 | Stop reason: HOSPADM

## 2020-05-04 RX ORDER — SODIUM CHLORIDE, SODIUM LACTATE, POTASSIUM CHLORIDE, CALCIUM CHLORIDE 600; 310; 30; 20 MG/100ML; MG/100ML; MG/100ML; MG/100ML
100 INJECTION, SOLUTION INTRAVENOUS CONTINUOUS
Status: DISCONTINUED | OUTPATIENT
Start: 2020-05-04 | End: 2020-05-04 | Stop reason: HOSPADM

## 2020-05-04 RX ORDER — PROPOFOL 10 MG/ML
INJECTION, EMULSION INTRAVENOUS AS NEEDED
Status: DISCONTINUED | OUTPATIENT
Start: 2020-05-04 | End: 2020-05-04 | Stop reason: HOSPADM

## 2020-05-04 RX ORDER — LIDOCAINE HYDROCHLORIDE 20 MG/ML
INJECTION, SOLUTION EPIDURAL; INFILTRATION; INTRACAUDAL; PERINEURAL AS NEEDED
Status: DISCONTINUED | OUTPATIENT
Start: 2020-05-04 | End: 2020-05-04 | Stop reason: HOSPADM

## 2020-05-04 RX ADMIN — LIDOCAINE HYDROCHLORIDE 60 MG: 20 INJECTION, SOLUTION EPIDURAL; INFILTRATION; INTRACAUDAL; PERINEURAL at 14:39

## 2020-05-04 RX ADMIN — PROPOFOL 140 MCG/KG/MIN: 10 INJECTION, EMULSION INTRAVENOUS at 14:39

## 2020-05-04 RX ADMIN — SODIUM CHLORIDE, SODIUM LACTATE, POTASSIUM CHLORIDE, AND CALCIUM CHLORIDE 100 ML/HR: 600; 310; 30; 20 INJECTION, SOLUTION INTRAVENOUS at 12:45

## 2020-05-04 RX ADMIN — PROPOFOL 50 MG: 10 INJECTION, EMULSION INTRAVENOUS at 14:39

## 2020-05-04 NOTE — H&P
Gastrointestinal Progress Note    5/4/2020    Admit Date: 5/4/2020    Subjective:     Patient is NPO for an EGD (dysphagia)    Pain: Patient complains of no abdominal pain. Bowel Movements: Normal    Bleeding:  None    Current Facility-Administered Medications   Medication Dose Route Frequency    lactated Ringers infusion  100 mL/hr IntraVENous CONTINUOUS        Objective:     Blood pressure 139/74, pulse 73, temperature 97.7 °F (36.5 °C), resp. rate 16, SpO2 100 %. No intake/output data recorded. No intake/output data recorded. EXAM:  HEART: regular rate and rhythm, S1, S2 normal, no murmur, click, rub or gallop, LUNGS: chest clear, no wheezing, rales, normal symmetric air entry, Heart exam - S1, S2 normal, no murmur, no gallop, rate regular and ABDOMEN:  Bowel sounds are normal, liver is not enlarged, spleen is not enlarged    Data Review  No results found for this or any previous visit (from the past 24 hour(s)). Assessment:     Active Problems:  Dysphagia  Radiation stricture  History of scc of the base of tongue      Plan:     EGD with dilatation. Risks (bleed, perforation, infection, untoward CV or resp. Event, mortality, etc.), benefits and alternatives were discussed with the patient who agrees to proceed.   Latonya Bruno MD

## 2020-05-04 NOTE — DISCHARGE INSTRUCTIONS
Gastrointestinal Esophagogastroduodenoscopy (EGD) - Upper Exam Discharge Instructions    1. Call Dr. Moise Bruce at [phone] for any problems or questions. 2. Contact the doctor's office for follow up appointment as directed. 3. Medication may cause drowsiness for several hours, therefore, do not drive or operate machinery for remainder of the day. Do not make any legal decisions today. 4. No alcohol today. 5. Ordinarily, you may resume regular diet and activity after exam unless otherwise specified by your physician. 6. For mild soreness in your throat you may use Cepacol throat lozenges or warm salt-water gargles as needed. Any additional instructions:  Office will follow-up  Continue medications     Instructions given to Jose Antonio Almeida and other family members.

## 2020-05-04 NOTE — ROUTINE PROCESS
Due to the COVID-19 crisis, driving companions have been asked to stay in vehicle while patient has procedure performed. Discharge instructions have been reviewed with patient and  via phone call. Signature pad not available for  to sign. VSS at discharge. No complaints noted. Education reviewed with patient and wife. Pt discharged via wheelchair by Maci Reese, [address] [place] [address]. Patient to be driven home by his wife.

## 2020-05-04 NOTE — ANESTHESIA POSTPROCEDURE EVALUATION
Procedure(s):  ESOPHAGOGASTRODUODENOSCOPY (EGD)/ BMI 22  ESOPHAGEAL DILATION. total IV anesthesia    Anesthesia Post Evaluation      Multimodal analgesia: multimodal analgesia used between 6 hours prior to anesthesia start to PACU discharge  Patient location during evaluation: bedside  Patient participation: complete - patient participated  Level of consciousness: responsive to verbal stimuli  Pain management: adequate  Airway patency: patent  Anesthetic complications: no  Cardiovascular status: hemodynamically stable  Respiratory status: spontaneous ventilation  Hydration status: stable        Vitals Value Taken Time   /82 5/4/2020  3:03 PM   Temp 36.7 °C (98 °F) 5/4/2020  3:03 PM   Pulse 64 5/4/2020  3:04 PM   Resp 14 5/4/2020  3:03 PM   SpO2 100 % 5/4/2020  3:04 PM   Vitals shown include unvalidated device data.

## 2020-05-05 NOTE — OP NOTES
[address] [facility]  OPERATIVE REPORT    Name:  Zainab Pedraza  MR#:  [id number]  :  1950  ACCOUNT #:  [de-identified]  DATE OF SERVICE:  2020    PREOPERATIVE DIAGNOSIS:  Proximal esophageal radiation stricture. POSTOPERATIVE DIAGNOSES:  1. Proximal esophageal stricture. 2.  Gastritis. PROCEDURES PERFORMED:  1. EGD with dilatation with Savary dilators. 2.  Fluoroscopy. PRIMARY GASTROENTEROLOGIST:  Ashia Landrum MD    SURGEON:  None. ASSISTANT:  None. ANESTHESIA:  As per MAC anesthesia. COMPLICATIONS:  None. SPECIMENS REMOVED:  None. IMPLANTS:  None. ESTIMATED BLOOD LOSS:  None. INSTRUMENT:  GIF-190 video endoscope and Savary dilators, as well as fluoroscopy. SUBJECTIVE:  A 76-year-old male presented with dysphagia, intermittent, worsening recently over the last three to four weeks. He is dilated on a routine basis (every three to four months) because of a history of proximal radiation stricture. Upper endoscopy was done today for guidewire placement in order to proceed with Savary dilatation. Risks (bleeding, perforation, infection, untoward cardiovascular or respiratory event, mortality), benefits, and alternatives were discussed in detail with the patient who agreed to proceed. PROCEDURE:  The patient was anesthetized and positioned, the video endoscope was passed through the hypopharynx with minimal difficulty. Proximal, mid and distal esophagus were unremarkable. There were ectatic vessels in the hypopharynx consistent with previous radiation effect. Once passed, there was a subtle stricture just distal to the cricopharyngeus. Once past the EG junction, the body and antrum were visualized and were unremarkable except for erythema along the greater curvature of the antrum. A retroflexed view of the angularis was normal.  A retroflexed view of the EG junction and cardia revealed no other specific abnormalities.   Attention was then turned to the duodenum. Duodenal bulb and C-loop were normal.  No active ulcer disease was seen. The endoscope was backed in the stomach where the air was decompressed. Under fluoroscopy, the guidewire was advanced through the scope and the endoscope was advanced into the antrum. The endoscope was removed from the patient as the guidewire was maintained in good position under fluoroscopy. Over the guidewire, #12, 14, and 16 Savary dilators (36, 42, and 50 [place]) were serially passed using fluoroscopy to maintain good position of the guidewire and then documented that the dilator was passed into the gastric lumen. Once the 16 dilator had passed, both dilator and guidewire were removed from the patient. After this, the endoscope was re-advanced through the hypopharynx and esophagus and advanced inside the stomach. There was minimal trauma to the cricopharyngeal area, mucosa was intact. After documentation of hemostasis, the endoscope was brought into the stomach where air was decompressed, backed out of the esophagus and out of the patient. The vocal cords appeared normal.  The patient tolerated the procedure well and taken to recovery area in stable condition. IMPRESSION:  1. Subtle proximal esophageal stricture (radiation related as tolerated with Savary dilatation as described above - fluoroscopy use. 2.  Gastritis. PLAN:  Diagnostic:  1. Followup symptoms    Therapeutic:  1. Continue PPI therapy. 2.  Follow up dilatation in approximately four months as we have been doing on a routine basis for a prolonged period of time.           MD MARGOTH Norman Se/[id number]  D:  05/04/2020 15:22  T:  05/05/2020 0:30  JOB #:  [id number]  CC:  [facility]       MD Richardson Bauer MD

## 2020-06-02 ENCOUNTER — HOSPITAL ENCOUNTER (OUTPATIENT)
Dept: ULTRASOUND IMAGING | Age: 70
Discharge: HOME | End: 2020-06-02
Attending: NURSE PRACTITIONER
Payer: MEDICARE

## 2020-06-02 DIAGNOSIS — Z13.6 SCREENING FOR CARDIOVASCULAR CONDITION: ICD-10-CM

## 2020-06-02 PROCEDURE — 76706 US ABDL AORTA SCREEN AAA: CPT

## 2020-08-12 ENCOUNTER — ANESTHESIA EVENT (OUTPATIENT)
Dept: ENDOSCOPY | Age: 70
End: 2020-08-12
Payer: MEDICARE

## 2020-08-12 RX ORDER — SODIUM CHLORIDE, SODIUM LACTATE, POTASSIUM CHLORIDE, CALCIUM CHLORIDE 600; 310; 30; 20 MG/100ML; MG/100ML; MG/100ML; MG/100ML
100 INJECTION, SOLUTION INTRAVENOUS CONTINUOUS
Status: CANCELLED | OUTPATIENT
Start: 2020-08-12

## 2020-08-13 ENCOUNTER — HOSPITAL ENCOUNTER (OUTPATIENT)
Age: 70
Setting detail: OUTPATIENT SURGERY
Discharge: HOME | End: 2020-08-13
Attending: INTERNAL MEDICINE | Admitting: INTERNAL MEDICINE
Payer: MEDICARE

## 2020-08-13 ENCOUNTER — ANESTHESIA (OUTPATIENT)
Dept: ENDOSCOPY | Age: 70
End: 2020-08-13
Payer: MEDICARE

## 2020-08-13 ENCOUNTER — APPOINTMENT (OUTPATIENT)
Dept: GENERAL RADIOLOGY | Age: 70
End: 2020-08-13
Attending: INTERNAL MEDICINE
Payer: MEDICARE

## 2020-08-13 VITALS
HEART RATE: 63 BPM | SYSTOLIC BLOOD PRESSURE: 127 MMHG | OXYGEN SATURATION: 99 % | DIASTOLIC BLOOD PRESSURE: 80 MMHG | RESPIRATION RATE: 16 BRPM | TEMPERATURE: 97 F

## 2020-08-13 PROCEDURE — 74011000250 HC RX REV CODE- 250: Performed by: NURSE ANESTHETIST, CERTIFIED REGISTERED

## 2020-08-13 PROCEDURE — 74011250636 HC RX REV CODE- 250/636: Performed by: ANESTHESIOLOGY

## 2020-08-13 PROCEDURE — 76040000025: Performed by: INTERNAL MEDICINE

## 2020-08-13 PROCEDURE — 74011250636 HC RX REV CODE- 250/636: Performed by: NURSE ANESTHETIST, CERTIFIED REGISTERED

## 2020-08-13 PROCEDURE — 76060000032 HC ANESTHESIA 0.5 TO 1 HR: Performed by: INTERNAL MEDICINE

## 2020-08-13 RX ORDER — PROPOFOL 10 MG/ML
INJECTION, EMULSION INTRAVENOUS AS NEEDED
Status: DISCONTINUED | OUTPATIENT
Start: 2020-08-13 | End: 2020-08-13 | Stop reason: HOSPADM

## 2020-08-13 RX ORDER — LIDOCAINE HYDROCHLORIDE 20 MG/ML
INJECTION, SOLUTION EPIDURAL; INFILTRATION; INTRACAUDAL; PERINEURAL AS NEEDED
Status: DISCONTINUED | OUTPATIENT
Start: 2020-08-13 | End: 2020-08-13 | Stop reason: HOSPADM

## 2020-08-13 RX ORDER — SODIUM CHLORIDE, SODIUM LACTATE, POTASSIUM CHLORIDE, CALCIUM CHLORIDE 600; 310; 30; 20 MG/100ML; MG/100ML; MG/100ML; MG/100ML
100 INJECTION, SOLUTION INTRAVENOUS CONTINUOUS
Status: DISCONTINUED | OUTPATIENT
Start: 2020-08-13 | End: 2020-08-13 | Stop reason: HOSPADM

## 2020-08-13 RX ADMIN — LIDOCAINE HYDROCHLORIDE 100 MG: 20 INJECTION, SOLUTION EPIDURAL; INFILTRATION; INTRACAUDAL; PERINEURAL at 14:05

## 2020-08-13 RX ADMIN — PROPOFOL 20 MG: 10 INJECTION, EMULSION INTRAVENOUS at 14:12

## 2020-08-13 RX ADMIN — SODIUM CHLORIDE, SODIUM LACTATE, POTASSIUM CHLORIDE, AND CALCIUM CHLORIDE: 600; 310; 30; 20 INJECTION, SOLUTION INTRAVENOUS at 13:58

## 2020-08-13 RX ADMIN — PROPOFOL 20 MG: 10 INJECTION, EMULSION INTRAVENOUS at 14:09

## 2020-08-13 RX ADMIN — PROPOFOL 30 MG: 10 INJECTION, EMULSION INTRAVENOUS at 14:07

## 2020-08-13 RX ADMIN — PROPOFOL 50 MG: 10 INJECTION, EMULSION INTRAVENOUS at 14:05

## 2020-08-13 RX ADMIN — PROPOFOL 20 MG: 10 INJECTION, EMULSION INTRAVENOUS at 14:10

## 2020-08-13 RX ADMIN — PROPOFOL 20 MG: 10 INJECTION, EMULSION INTRAVENOUS at 14:15

## 2020-08-13 NOTE — ANESTHESIA POSTPROCEDURE EVALUATION
Procedure(s):  ESOPHAGOGASTRODUODENOSCOPY (EGD)/BMI 22  ESOPHAGEAL DILATION. total IV anesthesia    Anesthesia Post Evaluation        Patient location during evaluation: PACU  Patient participation: complete - patient participated  Level of consciousness: awake  Pain management: satisfactory to patient  Airway patency: patent  Anesthetic complications: no  Cardiovascular status: hemodynamically stable  Respiratory status: spontaneous ventilation  Hydration status: euvolemic  Post anesthesia nausea and vomiting:  none      INITIAL Post-op Vital signs:   Vitals Value Taken Time   /69 8/13/2020  2:42 PM   Temp 36.1 °C (97 °F) 8/13/2020  2:34 PM   Pulse 68 8/13/2020  2:50 PM   Resp 16 8/13/2020  2:34 PM   SpO2 98 % 8/13/2020  2:50 PM   Vitals shown include unvalidated device data.

## 2020-08-13 NOTE — H&P
Gastrointestinal Progress Note    8/13/2020    Admit Date: 8/13/2020    Subjective:     Patient is NPO for an EGD/dilatation (dysphagia). Pain: Patient complains of no abdominal pain. Bowel Movements: Normal    Bleeding:  None    Current Facility-Administered Medications   Medication Dose Route Frequency    lactated Ringers infusion  100 mL/hr IntraVENous CONTINUOUS        Objective:     Blood pressure 141/85, pulse 70, temperature 98.2 °F (36.8 °C), resp. rate 18, SpO2 97 %. No intake/output data recorded. No intake/output data recorded. EXAM:  HEART: regular rate and rhythm, S1, S2 normal, no murmur, click, rub or gallop, LUNGS: chest clear, no wheezing, rales, normal symmetric air entry, Heart exam - S1, S2 normal, no murmur, no gallop, rate regular and ABDOMEN:  Bowel sounds are normal, liver is not enlarged, spleen is not enlarged    Data Review  No results found for this or any previous visit (from the past 24 hour(s)). Assessment:     Active Problems:  Dysphagia  History of proximal esophageal radiation stricture  History of SCC of the base of the tongue      Plan:     EGD with dilatation. Risks (bleed, perforation, infection, untoward CV or resp. Event,, mortality, etc.), benefits, and alternatives were discussed with the patient who agrees to proceed.   Bruce Gomez MD

## 2020-08-13 NOTE — DISCHARGE INSTRUCTIONS
Gastrointestinal Esophagogastroduodenoscopy (EGD) - Upper Exam Discharge Instructions    1. Call Dr. Debora Yates at [phone] for any problems or questions. 2. Contact the doctor's office for follow up appointment as directed. 3. Medication may cause drowsiness for several hours, therefore:  · Do not drive or operate machinery for remainder of the day. · No alcohol today. · Do not make any important or legal decisions for 24 hours. · Do not sign any legal documents for 24 hours. 5. Ordinarily, you may resume regular diet and activity after exam unless otherwise              specified by your physician. 6. For mild soreness in your throat you may use Cepacol throat lozenges or warm               salt-water gargles as needed. Any additional instructions:  1. Repeat procedure in 3 months      Instructions given to Jamel Guerrero and other family members.

## 2020-08-14 NOTE — OP NOTES
[address] [facility]  OPERATIVE REPORT    Name:  Torrence Schilder  MR#:  [id number]  :  1950  ACCOUNT #:  [de-identified]  DATE OF SERVICE:  2020    PREOPERATIVE DIAGNOSES:  1. Proximal esophageal radiation stricture. 2.  Gastritis. POSTOPERATIVE DIAGNOSES:  1. Proximal esophageal radiation stricture. 2.  Gastritis. PROCEDURES PERFORMED:  1. EGD with Savary dilatation (#s 12, 14, and 16 dilators). 2.  Fluoroscopy used. PRIMARY GASTROENTEROLOGIST:  Maria Del Rosario Goodman MD    SURGEON:  None. ASSISTANT:  None. ANESTHESIA:  Per MAC anesthesia. COMPLICATIONS:  None. SPECIMENS REMOVED:  Laboratory:  None. IMPLANTS:  None. ESTIMATED BLOOD LOSS:  0 mL. INSTRUMENT:  GIF-H190 video endoscope. SUBJECTIVE:  A 76-year-old male who is undergoing upper endoscopy because of dysphagia associated with a proximal radiation stricture. Upper endoscopy was done today with Savary dilators, under fluoroscopy. Risks (bleeding, perforation, infection, untoward cardiovascular or respiratory event, and mortality), benefits, and alternatives were discussed in detail with the patient who agreed to proceed. PROCEDURE:  The patient was anesthetized and positioned. A videoscope was passed through the hypopharynx with minimal difficulty into the esophagus. Immediately distal to the cricopharyngeus, there was a very subtle stricture present. There were also in the hypopharynx telangiectasias consistent with previous radiation effect. The remaining mid and distal esophagus were unremarkable. Once inside the stomach, the body and antrum were unremarkable except for antral erythema. A retroflexed view of the angularis was normal.  A retroflexed view of the EG junction and cardia revealed no other specific abnormalities. Attention was then turned to the duodenum. The duodenal bulb and C-loop were normal.  No active ulcer disease was seen.   The endoscope was brought back into the stomach. Under fluoroscopy, a guidewire was advanced through the endoscope and maintained in good position in the gastric lumen. The endoscope was then removed from the patient. Over the guidewire, #s 12, 14, and 16 Savary dilators were serially passed over the wire and under fluoroscopy, advanced into the gastric lumen. Fluoroscopy was used to maintain good position of the guidewire. Once the #16 had passed, both dilator and guidewire were removed from the patient. The endoscope was then advanced back through the hypopharynx and esophagus where there was minimal mucosal trauma to the cricopharyngeus/proximal esophageal area. The mucosa appeared intact. The remaining esophagus and stomach were unremarkable except for the above-mentioned gastritis. The endoscope was backed out of the esophagus and out of the patient. The vocal cords appeared normal.  The patient tolerated the procedure well and taken to the recovery area in stable condition. IMPRESSION:  1. Proximal radiation stricture - dilated as described above. 2.  Gastritis. PLAN:  Therapeutics. 1.  Follow-up dilatation in three months. 2.  Continue PPI therapy.       MD MARGOTH Sauer/[email or web address]  D:  08/13/2020 14:51  T:  08/13/2020 23:52  JOB #:  [id number]  CC:  [facility]       MD Chacha Schreiber MD

## 2020-11-19 ENCOUNTER — ANESTHESIA EVENT (OUTPATIENT)
Dept: ENDOSCOPY | Age: 70
End: 2020-11-19
Payer: MEDICARE

## 2020-11-19 NOTE — PROGRESS NOTES
Unable to reach patient via phone. Voicemail and call back number provided with detailed message including arrival time, procedure time, and ride presence.

## 2020-11-20 ENCOUNTER — HOSPITAL ENCOUNTER (OUTPATIENT)
Age: 70
Setting detail: OUTPATIENT SURGERY
Discharge: HOME | End: 2020-11-20
Attending: INTERNAL MEDICINE | Admitting: INTERNAL MEDICINE
Payer: MEDICARE

## 2020-11-20 ENCOUNTER — APPOINTMENT (OUTPATIENT)
Dept: GENERAL RADIOLOGY | Age: 70
End: 2020-11-20
Attending: INTERNAL MEDICINE
Payer: MEDICARE

## 2020-11-20 ENCOUNTER — ANESTHESIA (OUTPATIENT)
Dept: ENDOSCOPY | Age: 70
End: 2020-11-20
Payer: MEDICARE

## 2020-11-20 VITALS
HEART RATE: 69 BPM | SYSTOLIC BLOOD PRESSURE: 147 MMHG | DIASTOLIC BLOOD PRESSURE: 90 MMHG | RESPIRATION RATE: 19 BRPM | OXYGEN SATURATION: 98 % | TEMPERATURE: 98.6 F

## 2020-11-20 PROCEDURE — 74011250636 HC RX REV CODE- 250/636: Performed by: ANESTHESIOLOGY

## 2020-11-20 PROCEDURE — 74011000250 HC RX REV CODE- 250: Performed by: NURSE ANESTHETIST, CERTIFIED REGISTERED

## 2020-11-20 PROCEDURE — 74011250636 HC RX REV CODE- 250/636: Performed by: NURSE ANESTHETIST, CERTIFIED REGISTERED

## 2020-11-20 PROCEDURE — 2709999900 HC NON-CHARGEABLE SUPPLY: Performed by: INTERNAL MEDICINE

## 2020-11-20 PROCEDURE — 76060000032 HC ANESTHESIA 0.5 TO 1 HR: Performed by: INTERNAL MEDICINE

## 2020-11-20 PROCEDURE — 76040000025: Performed by: INTERNAL MEDICINE

## 2020-11-20 RX ORDER — MIDAZOLAM HYDROCHLORIDE 1 MG/ML
2 INJECTION, SOLUTION INTRAMUSCULAR; INTRAVENOUS ONCE
Status: DISCONTINUED | OUTPATIENT
Start: 2020-11-20 | End: 2020-11-20 | Stop reason: HOSPADM

## 2020-11-20 RX ORDER — SODIUM CHLORIDE, SODIUM LACTATE, POTASSIUM CHLORIDE, CALCIUM CHLORIDE 600; 310; 30; 20 MG/100ML; MG/100ML; MG/100ML; MG/100ML
100 INJECTION, SOLUTION INTRAVENOUS CONTINUOUS
Status: DISCONTINUED | OUTPATIENT
Start: 2020-11-20 | End: 2020-11-20 | Stop reason: HOSPADM

## 2020-11-20 RX ORDER — NALOXONE HYDROCHLORIDE 0.4 MG/ML
0.1 INJECTION, SOLUTION INTRAMUSCULAR; INTRAVENOUS; SUBCUTANEOUS AS NEEDED
Status: DISCONTINUED | OUTPATIENT
Start: 2020-11-20 | End: 2020-11-20 | Stop reason: HOSPADM

## 2020-11-20 RX ORDER — ALBUTEROL SULFATE 0.83 MG/ML
2.5 SOLUTION RESPIRATORY (INHALATION) AS NEEDED
Status: DISCONTINUED | OUTPATIENT
Start: 2020-11-20 | End: 2020-11-20 | Stop reason: HOSPADM

## 2020-11-20 RX ORDER — FENTANYL CITRATE 50 UG/ML
100 INJECTION, SOLUTION INTRAMUSCULAR; INTRAVENOUS ONCE
Status: DISCONTINUED | OUTPATIENT
Start: 2020-11-20 | End: 2020-11-20 | Stop reason: HOSPADM

## 2020-11-20 RX ORDER — LIDOCAINE HYDROCHLORIDE 20 MG/ML
INJECTION, SOLUTION EPIDURAL; INFILTRATION; INTRACAUDAL; PERINEURAL AS NEEDED
Status: DISCONTINUED | OUTPATIENT
Start: 2020-11-20 | End: 2020-11-20 | Stop reason: HOSPADM

## 2020-11-20 RX ORDER — PROPOFOL 10 MG/ML
INJECTION, EMULSION INTRAVENOUS AS NEEDED
Status: DISCONTINUED | OUTPATIENT
Start: 2020-11-20 | End: 2020-11-20 | Stop reason: HOSPADM

## 2020-11-20 RX ORDER — MIDAZOLAM HYDROCHLORIDE 1 MG/ML
2 INJECTION, SOLUTION INTRAMUSCULAR; INTRAVENOUS
Status: DISCONTINUED | OUTPATIENT
Start: 2020-11-20 | End: 2020-11-20 | Stop reason: HOSPADM

## 2020-11-20 RX ORDER — LIDOCAINE HYDROCHLORIDE 10 MG/ML
0.1 INJECTION INFILTRATION; PERINEURAL AS NEEDED
Status: DISCONTINUED | OUTPATIENT
Start: 2020-11-20 | End: 2020-11-20 | Stop reason: HOSPADM

## 2020-11-20 RX ORDER — ONDANSETRON 2 MG/ML
4 INJECTION INTRAMUSCULAR; INTRAVENOUS ONCE
Status: DISCONTINUED | OUTPATIENT
Start: 2020-11-20 | End: 2020-11-20 | Stop reason: HOSPADM

## 2020-11-20 RX ORDER — DIPHENHYDRAMINE HYDROCHLORIDE 50 MG/ML
12.5 INJECTION, SOLUTION INTRAMUSCULAR; INTRAVENOUS
Status: DISCONTINUED | OUTPATIENT
Start: 2020-11-20 | End: 2020-11-20 | Stop reason: HOSPADM

## 2020-11-20 RX ADMIN — LIDOCAINE HYDROCHLORIDE 40 MG: 20 INJECTION, SOLUTION EPIDURAL; INFILTRATION; INTRACAUDAL; PERINEURAL at 12:31

## 2020-11-20 RX ADMIN — PROPOFOL 20 MG: 10 INJECTION, EMULSION INTRAVENOUS at 12:39

## 2020-11-20 RX ADMIN — PROPOFOL 20 MG: 10 INJECTION, EMULSION INTRAVENOUS at 12:43

## 2020-11-20 RX ADMIN — PROPOFOL 20 MG: 10 INJECTION, EMULSION INTRAVENOUS at 12:38

## 2020-11-20 RX ADMIN — SODIUM CHLORIDE, SODIUM LACTATE, POTASSIUM CHLORIDE, AND CALCIUM CHLORIDE 100 ML/HR: 600; 310; 30; 20 INJECTION, SOLUTION INTRAVENOUS at 11:44

## 2020-11-20 RX ADMIN — PROPOFOL 40 MG: 10 INJECTION, EMULSION INTRAVENOUS at 12:32

## 2020-11-20 RX ADMIN — PROPOFOL 60 MG: 10 INJECTION, EMULSION INTRAVENOUS at 12:31

## 2020-11-20 RX ADMIN — PROPOFOL 20 MG: 10 INJECTION, EMULSION INTRAVENOUS at 12:44

## 2020-11-20 RX ADMIN — PROPOFOL 20 MG: 10 INJECTION, EMULSION INTRAVENOUS at 12:37

## 2020-11-20 RX ADMIN — PROPOFOL 20 MG: 10 INJECTION, EMULSION INTRAVENOUS at 12:42

## 2020-11-20 RX ADMIN — PROPOFOL 20 MG: 10 INJECTION, EMULSION INTRAVENOUS at 12:41

## 2020-11-20 RX ADMIN — PROPOFOL 40 MG: 10 INJECTION, EMULSION INTRAVENOUS at 12:40

## 2020-11-20 NOTE — H&P
Gastrointestinal Progress Note    11/20/2020    Admit Date: 11/20/2020    Subjective:     Patient is NPO for an EGD with dilatation (radiation st   No abdominal pain. Bowel Movements: Normal    Bleeding:  None    Current Facility-Administered Medications   Medication Dose Route Frequency    lidocaine (XYLOCAINE) 10 mg/mL (1 %) injection 0.1 mL  0.1 mL SubCUTAneous PRN    lactated Ringers infusion  100 mL/hr IntraVENous CONTINUOUS    fentaNYL citrate (PF) injection 100 mcg  100 mcg IntraVENous ONCE    midazolam (VERSED) injection 2 mg  2 mg IntraVENous ONCE PRN    midazolam (VERSED) injection 2 mg  2 mg IntraVENous ONCE    lactated Ringers infusion  100 mL/hr IntraVENous CONTINUOUS    albuterol (PROVENTIL VENTOLIN) nebulizer solution 2.5 mg  2.5 mg Inhalation PRN    naloxone (NARCAN) injection 0.1 mg  0.1 mg IntraVENous PRN    ondansetron (ZOFRAN) injection 4 mg  4 mg IntraVENous ONCE    diphenhydrAMINE (BENADRYL) injection 12.5 mg  12.5 mg IntraVENous ONCE PRN        Objective:     Blood pressure 125/75, pulse 64, temperature 97.5 °F (36.4 °C), resp. rate 18, SpO2 100 %. No intake/output data recorded. No intake/output data recorded. EXAM:  HEART: regular rate and rhythm, S1, S2 normal, no murmur, click, rub or gallop, LUNGS: chest clear, no wheezing, rales, normal symmetric air entry, Heart exam - S1, S2 normal, no murmur, no gallop, rate regular and ABDOMEN:  Bowel sounds are normal, liver is not enlarged, spleen is not enlarged    Data Review  No results found for this or any previous visit (from the past 24 hour(s)). Assessment:     Active Problems:  Dysphagia (radiation stricture)  Hyperlipidemia  Hypothyroidism. Plan:     EGD with dilatation. Risks (bleed, perforation, infection, untoward CV or resp. Event, mortality, etc.), benefits, and alternatives were discussed with the patient who agrees to proceed.   Armando Russell MD

## 2020-11-20 NOTE — DISCHARGE INSTRUCTIONS
Gastrointestinal Esophagogastroduodenoscopy (EGD) - Upper Exam Discharge Instructions    1. Call Dr. Andriy Delgadillo at [phone] for any problems or questions. 2. Contact the doctor's office for follow up appointment as directed. 3. Medication may cause drowsiness for several hours, therefore:  · Do not drive or operate machinery for remainder of the day. · No alcohol today. · Do not make any important or legal decisions for 24 hours. · Do not sign any legal documents for 24 hours. 5. Ordinarily, you may resume regular diet and activity after exam unless otherwise              specified by your physician. 6. For mild soreness in your throat you may use Cepacol throat lozenges or warm               salt-water gargles as needed. Any additional instructions:  1. Follow up with office. Instructions given to Annette Lewis and other family members.

## 2020-11-20 NOTE — PROGRESS NOTES
's pre-procedure visit and prayer with Mr. Gauthieria Dahlia as requested.     Ainsley Bullard MDiv, BS  Board Certified

## 2020-11-21 NOTE — OP NOTES
[facility]  OPERATIVE REPORT    Name:  Alyce Downs  MR#:  [id number]  :  1950  ACCOUNT #:  [de-identified]  DATE OF SERVICE:  2020    ESOPHAGOGASTRODUODENOSCOPY NOTE    PREOPERATIVE DIAGNOSIS:  Dysphagia associated with radiation stricture. POSTOPERATIVE DIAGNOSES:  1. Dysphagia associated with radiation stricture. 2.  Gastritis. PROCEDURES PERFORMED:  1. EGD with dilatation over a guidewire (Savary dilator). 2.  Fluoroscopy used. PRIMARY GASTROENTEROLOGIST:  Jose Hwang MD    SURGEON:  None. ASSISTANT:  None. ANESTHESIA:  MAC anesthesia. COMPLICATIONS:  None. SPECIMENS REMOVED:  To laboratory - none. IMPLANTS:  None. ESTIMATED BLOOD LOSS:  0 mL. INSTRUMENT:  GIF-H190 video endoscope. SUBJECTIVE:  A 26-year-old male who is undergoing an upper endoscopy because of proximal radiation stricture. He does have dysphagia. Upper endoscopy was done today for dilatation purposes. PROCEDURE:  The patient was anesthetized and positioned. The video endoscope was passed under through the hypopharynx with minimal difficulty into the esophagus . Proximal, mid, and distal esophagus were unremarkable. I could not see a stricture initially. Once past EG junction, the body and the antrum of the stomach were visualized and has streaky erythema in the greater curvature of the distal body of the stomach and the antrum. A retroflexed view of the angularis was normal.  A retroflexed view of the EG junction and cardia revealed no other specific abnormalities. Attention was then turned to the duodenum. Duodenal bulb and C-loop were normal.  No active ulcer disease was seen. The endoscope was backed to the stomach. Under fluoroscopy, a Savary guidewire was advanced mima antrum and endoscope was removed from the patient and kept initially in good position as the endoscope was removed from the patient.     We then started Savary dilatations with a #12 Savary. However, at this point in time, we noticed that the guidewire head had been pulled back into the chest.  Therefore, we did not proceed with any further dilatations and re-endoscoped the patient. The guidewire had been removed from the patient. We had to re-endoscope the patient and placed the endoscope in the antrum, again under fluoroscopy, the guidewire was advanced and kept in good position as the endoscope was removed from the patient. At this point in time, we proceeded with Savary dilations. We kept the guidewire in good position in the antrum. #12, 14, and 16 Savary dilators were sterilely passed without difficulty. There was no heme on the dilators. After the #16 had passed, both dilator and guidewire were removed from the patient. We did advance the endoscope through the hypopharynx and esophagus, but there was no significant esophageal trauma except for mild dilator irritation to the cricopharyngeus. Again, the gastritis was noted. No biopsies or CLOtest were performed today because the patient is taking PPI therapy. After documentation of hemostasis, the endoscope was backed out of the esophagus and out of the patient. There was radiation effect to the hypopharyngeal area. The patient tolerated the procedure well and taken to recovery area in stable condition. IMPRESSION:  1. Gastritis. 2.  Subtle radiation stricture at the cricopharyngeus - Savary dilation was performed as described above. 3.  Radiation effect to the hypopharyngeal area. PLAN:  Diagnostic:  1. Follow up dilatations every 3-4 months. Therapeutics:  1. Continue PPI therapy.       MD MARGOTH [email or web address]  D:  11/20/2020 13:13  T:  11/20/2020 23:59  JOB #:  [id number]  CC:  [facility]       MD Armando Garrison MD

## 2020-11-23 NOTE — ANESTHESIA POSTPROCEDURE EVALUATION
Procedure(s):  ESOPHAGOGASTRODUODENOSCOPY (EGD)/ BMI 22 *FLOURO RM PER DOCTOR REQ  ESOPHAGEAL DILATION.     total IV anesthesia    Anesthesia Post Evaluation      Multimodal analgesia: multimodal analgesia used between 6 hours prior to anesthesia start to PACU discharge  Patient location during evaluation: PACU  Patient participation: complete - patient participated  Level of consciousness: awake and awake and alert  Pain management: adequate  Airway patency: patent  Anesthetic complications: no  Cardiovascular status: acceptable  Respiratory status: acceptable  Hydration status: acceptable  Post anesthesia nausea and vomiting:  controlled      INITIAL Post-op Vital signs:   Vitals Value Taken Time   /90 11/20/2020  1:34 PM   Temp 37 °C (98.6 °F) 11/20/2020  1:00 PM   Pulse 69 11/20/2020  1:34 PM   Resp 19 11/20/2020  1:34 PM   SpO2 98 % 11/20/2020  1:34 PM

## 2020-12-21 NOTE — ROUTINE PROCESS
Called stating since stopping the Letairis her ears are not plugged, and less nasal stuffiness and states her weight is up three pounds to 118. DC paperwork reviewed with patient and spouse. All questions answered and IV removed.

## 2021-01-12 ENCOUNTER — APPOINTMENT (OUTPATIENT)
Dept: CT IMAGING | Age: 71
End: 2021-01-12
Attending: EMERGENCY MEDICINE
Payer: MEDICARE

## 2021-01-12 ENCOUNTER — HOSPITAL ENCOUNTER (EMERGENCY)
Age: 71
Discharge: HOME | End: 2021-01-12
Attending: EMERGENCY MEDICINE
Payer: MEDICARE

## 2021-01-12 VITALS
HEART RATE: 68 BPM | TEMPERATURE: 97.9 F | RESPIRATION RATE: 16 BRPM | OXYGEN SATURATION: 99 % | SYSTOLIC BLOOD PRESSURE: 124 MMHG | DIASTOLIC BLOOD PRESSURE: 78 MMHG

## 2021-01-12 DIAGNOSIS — R55 VASOVAGAL SYNCOPE: Primary | ICD-10-CM

## 2021-01-12 LAB
ALBUMIN SERPL-MCNC: 3.8 G/DL (ref 3.2–4.6)
ALBUMIN/GLOB SERPL: 1.1 {RATIO} (ref 1.2–3.5)
ALP SERPL-CCNC: 80 U/L (ref 50–136)
ALT SERPL-CCNC: 35 U/L (ref 12–65)
ANION GAP SERPL CALC-SCNC: 9 MMOL/L (ref 7–16)
AST SERPL-CCNC: 25 U/L (ref 15–37)
ATRIAL RATE: 58 BPM
BASOPHILS # BLD: 0 K/UL (ref 0–0.2)
BASOPHILS NFR BLD: 1 % (ref 0–2)
BILIRUB SERPL-MCNC: 0.5 MG/DL (ref 0.2–1.1)
BUN SERPL-MCNC: 15 MG/DL (ref 8–23)
CALCIUM SERPL-MCNC: 8.9 MG/DL (ref 8.3–10.4)
CALCULATED P AXIS, ECG09: 72 DEGREES
CALCULATED R AXIS, ECG10: 51 DEGREES
CALCULATED T AXIS, ECG11: 75 DEGREES
CHLORIDE SERPL-SCNC: 107 MMOL/L (ref 98–107)
CO2 SERPL-SCNC: 26 MMOL/L (ref 21–32)
CREAT SERPL-MCNC: 0.99 MG/DL (ref 0.8–1.5)
D DIMER PPP FEU-MCNC: 0.28 UG/ML(FEU)
DIAGNOSIS, 93000: NORMAL
DIFFERENTIAL METHOD BLD: ABNORMAL
EOSINOPHIL # BLD: 0.2 K/UL (ref 0–0.8)
EOSINOPHIL NFR BLD: 3 % (ref 0.5–7.8)
ERYTHROCYTE [DISTWIDTH] IN BLOOD BY AUTOMATED COUNT: 13.2 % (ref 11.9–14.6)
GLOBULIN SER CALC-MCNC: 3.5 G/DL (ref 2.3–3.5)
GLUCOSE SERPL-MCNC: 84 MG/DL (ref 65–100)
HCT VFR BLD AUTO: 41.8 % (ref 41.1–50.3)
HGB BLD-MCNC: 13.6 G/DL (ref 13.6–17.2)
IMM GRANULOCYTES # BLD AUTO: 0 K/UL (ref 0–0.5)
IMM GRANULOCYTES NFR BLD AUTO: 0 % (ref 0–5)
LYMPHOCYTES # BLD: 1 K/UL (ref 0.5–4.6)
LYMPHOCYTES NFR BLD: 18 % (ref 13–44)
MCH RBC QN AUTO: 32.1 PG (ref 26.1–32.9)
MCHC RBC AUTO-ENTMCNC: 32.5 G/DL (ref 31.4–35)
MCV RBC AUTO: 98.6 FL (ref 79.6–97.8)
MONOCYTES # BLD: 0.5 K/UL (ref 0.1–1.3)
MONOCYTES NFR BLD: 8 % (ref 4–12)
NEUTS SEG # BLD: 3.9 K/UL (ref 1.7–8.2)
NEUTS SEG NFR BLD: 70 % (ref 43–78)
NRBC # BLD: 0 K/UL (ref 0–0.2)
P-R INTERVAL, ECG05: 180 MS
PLATELET # BLD AUTO: 212 K/UL (ref 150–450)
PMV BLD AUTO: 10.6 FL (ref 9.4–12.3)
POTASSIUM SERPL-SCNC: 3.5 MMOL/L (ref 3.5–5.1)
PROT SERPL-MCNC: 7.3 G/DL (ref 6.3–8.2)
Q-T INTERVAL, ECG07: 418 MS
QRS DURATION, ECG06: 94 MS
QTC CALCULATION (BEZET), ECG08: 410 MS
RBC # BLD AUTO: 4.24 M/UL (ref 4.23–5.6)
SODIUM SERPL-SCNC: 142 MMOL/L (ref 136–145)
TROPONIN-HIGH SENSITIVITY: 4.8 PG/ML (ref 0–14)
TROPONIN-HIGH SENSITIVITY: 5.3 PG/ML (ref 0–14)
VENTRICULAR RATE, ECG03: 58 BPM
WBC # BLD AUTO: 5.6 K/UL (ref 4.3–11.1)

## 2021-01-12 PROCEDURE — 93005 ELECTROCARDIOGRAM TRACING: CPT | Performed by: EMERGENCY MEDICINE

## 2021-01-12 PROCEDURE — 85025 COMPLETE CBC W/AUTO DIFF WBC: CPT

## 2021-01-12 PROCEDURE — 84484 ASSAY OF TROPONIN QUANT: CPT

## 2021-01-12 PROCEDURE — 99285 EMERGENCY DEPT VISIT HI MDM: CPT

## 2021-01-12 PROCEDURE — 70450 CT HEAD/BRAIN W/O DYE: CPT

## 2021-01-12 PROCEDURE — 80053 COMPREHEN METABOLIC PANEL: CPT

## 2021-01-12 PROCEDURE — 85379 FIBRIN DEGRADATION QUANT: CPT

## 2021-01-12 NOTE — DISCHARGE INSTRUCTIONS
Continue to drink plenty of fluids  Continue all your current medications  Call your doctor for follow-up visit  Return to ER for any worsening symptoms or new problems which may arise

## 2021-01-12 NOTE — ED NOTES
I have reviewed discharge instructions with the patient. The patient verbalized understanding. Patient left ED via Discharge Method: ambulatory to lobby to wait for wife to pick him up. Opportunity for questions and clarification provided. Patient given 0 scripts. Push fluids and eat. To continue your aftercare when you leave the hospital, you may receive an automated call from our care team to check in on how you are doing. This is a free service and part of our promise to provide the best care and service to meet your aftercare needs.  If you have questions, or wish to unsubscribe from this service please call [phone]. Thank you for Choosing our [facility] Emergency Department.

## 2021-01-12 NOTE — ED PROVIDER NOTES
79-year-old male presents from his primary care doctor's office after a syncopal episode. Patient reports that he was getting his routine blood work done when he hot flushed. He laid his head back and the next he remembers is waking up on the floor. Dr. Monalisa Smallwood, primary care, called me and let me know that he was concerned about this patient. He did mention some EKG changes with some J-point elevation. Patient has a history of throat cancer. He sees GI regularly for esophageal dilatations  Patient states that he was having a normal day and was otherwise asymptomatic  Patient did not have any preceding chest pain or shortness of breath. Has had some issues with being dehydrated in the past but has been fairly diligent about keeping up with his fluid intake. Currently, the patient is without complaints. He is tolerating ice chips. The history is provided by the patient and the EMS personnel (Patient's primary care provider). Syncope   This is a new problem. The current episode started 1 to 2 hours ago. The problem occurs rarely. The problem has been resolved. He lost consciousness for a period of less than one minute. Associated with: Phlebotomy. Pertinent negatives include no chest pain, no palpitations, no confusion, no diaphoresis, no fever, no abdominal pain, no nausea, no vomiting, no headaches, no back pain, no dizziness, no light-headedness, no slurred speech and no head injury. He has tried nothing for the symptoms. His past medical history does not include no DM or no HTN.         Past Medical History:   Diagnosis Date    Arthritis     osteoporosis in joints    Chronic pain     back    DJD (degenerative joint disease)     back    Dysphagia, unspecified(418.57)     followed by dr Christie Burciaga    Esophageal stricture     has esophagus stretched every 3-4 months    GERD (gastroesophageal reflux disease)     OMEPRAZOLE DAILY- WELL CONTROLLED     History of anemia     hx-- not a recent  problem  History of squamous cell carcinoma 2008    at base of tongue/throat  surg, chemo and radiation    Hypercholesteremia     Hypothyroid     Low blood pressure     Osteoporosis     Peripheral neuropathy     chemo induced    Personal history of colonic polyps     PUD (peptic ulcer disease) ~2015    Throat cancer ([address] Dx 2009    throat-radiation, chemo    Vitamin D deficiency        Past Surgical History:   Procedure Laterality Date    COLONOSCOPY N/A 12/2/2019    COLONOSCOPY/BMI 22 performed by Nathen Primrose, [facility] at [facility] ENDOSCOPY    HX CATARACT REMOVAL Bilateral     HX COLONOSCOPY      HX CYST REMOVAL      right hand-ganglion    HX ENDOSCOPY      EGD EVERY 4 MONTHS/ DILATIONS    HX HEENT      cleaned out lymph nodes throat after chemo    HX LUMBAR LAMINECTOMY  2002    L5-S1    HX LYMPH NODE DISSECTION Left 2009    neck    HX TONSILLECTOMY  1972    HX VASCULAR ACCESS  port/ 2008    placed and removal    MI ABDOMEN SURGERY PROC UNLISTED  2009    PEG PLACED  then removed         Family History:   Problem Relation Age of Onset    Cancer Mother         pancreatic    Breast Cancer Mother     Pancreatic Cancer Mother     No Known Problems Brother     No Known Problems Sister     No Known Problems Sister     No Known Problems Sister     No Known Problems Sister     Other Brother         AGENT ORANGE    Diabetes Brother     Lung Disease Brother        Social History     Socioeconomic History    Marital status:      Spouse name: Not on file    Number of children: Not on file    Years of education: Not on file    Highest education level: Not on file   Occupational History    Not on file   Social Needs    Financial resource strain: Not on file    Food insecurity     Worry: Not on file     Inability: Not on file    Transportation needs     Medical: Not on file     Non-medical: Not on file   Tobacco Use    Smoking status: Former Smoker     Packs/day: 0.50     Years: 13.00 Pack years: 6.50     Quit date: 0     Years since quittin.0    Smokeless tobacco: Never Used   Substance and Sexual Activity    Alcohol use: No    Drug use: No    Sexual activity: Not on file   Lifestyle    Physical activity     Days per week: Not on file     Minutes per session: Not on file    Stress: Not on file   Relationships    Social connections     Talks on phone: Not on file     Gets together: Not on file     Attends Jain service: Not on file     Active member of club or organization: Not on file     Attends meetings of clubs or organizations: Not on file     Relationship status: Not on file    Intimate partner violence     Fear of current or ex partner: Not on file     Emotionally abused: Not on file     Physically abused: Not on file     Forced sexual activity: Not on file   Other Topics Concern    Not on file   Social History Narrative    Not on file         ALLERGIES: Hydrocodone    Review of Systems   Constitutional: Negative for activity change, chills, diaphoresis and fever. HENT: Positive for sore throat. Negative for dental problem, hearing loss, nosebleeds and rhinorrhea. Eyes: Negative for pain, discharge, redness and visual disturbance. Respiratory: Negative for cough, chest tightness and shortness of breath. Cardiovascular: Positive for syncope. Negative for chest pain, palpitations and leg swelling. Gastrointestinal: Negative for abdominal pain, constipation, diarrhea, nausea and vomiting. Endocrine: Negative for cold intolerance, heat intolerance, polydipsia and polyuria. Genitourinary: Negative for dysuria and flank pain. Musculoskeletal: Positive for arthralgias. Negative for back pain, joint swelling, myalgias and neck pain. Skin: Negative for pallor and rash. Allergic/Immunologic: Negative for environmental allergies and food allergies. Neurological: Negative for dizziness, tremors, light-headedness, numbness and headaches.    Hematological: Negative for adenopathy. Does not bruise/bleed easily. Psychiatric/Behavioral: Negative for confusion and dysphoric mood. The patient is not nervous/anxious and is not hyperactive. All other systems reviewed and are negative. Vitals:    01/12/21 1114   BP: 116/77   Pulse: (!) 59   Resp: 16   Temp: 97.9 °F (36.6 °C)   SpO2: 100%            Physical Exam  Vitals signs and nursing note reviewed. Constitutional:       General: He is in acute distress. Appearance: Normal appearance. He is well-developed and normal weight. HENT:      Head: Normocephalic and atraumatic. Right Ear: External ear normal.      Left Ear: External ear normal.   Eyes:      General: No scleral icterus. Extraocular Movements: Extraocular movements intact. Conjunctiva/sclera: Conjunctivae normal.      Pupils: Pupils are equal, round, and reactive to light. Neck:      Musculoskeletal: Normal range of motion and neck supple. Thyroid: No thyromegaly. Vascular: No JVD. Cardiovascular:      Rate and Rhythm: Regular rhythm. Bradycardia present. Pulses: Normal pulses. Heart sounds: Normal heart sounds. No murmur. No friction rub. No gallop. Pulmonary:      Effort: Pulmonary effort is normal. No respiratory distress. Breath sounds: Normal breath sounds. No wheezing. Abdominal:      General: Bowel sounds are normal. There is no distension. Palpations: Abdomen is soft. Tenderness: There is no abdominal tenderness. Musculoskeletal: Normal range of motion. General: No tenderness or deformity. Skin:     General: Skin is warm and dry. Capillary Refill: Capillary refill takes less than 2 seconds. Findings: No rash. Neurological:      General: No focal deficit present. Mental Status: He is alert and oriented to person, place, and time. Cranial Nerves: No cranial nerve deficit. Sensory: No sensory deficit. Motor: No abnormal muscle tone. Coordination: Coordination normal.   Psychiatric:         Mood and Affect: Mood normal.         Behavior: Behavior normal.         Thought Content: Thought content normal.         Judgment: Judgment normal.          MDM  Number of Diagnoses or Management Options  Vasovagal syncope: new and requires workup  Diagnosis management comments: EKG reviewed  Sinus rhythm  Normal axis bradycardic at 50 bpm  No ectopy  No acute ischemic changes    2:12 PM  Patient remains asymptomatic  CT and troponin levels remain negative    Patient will be discharged to follow-up with his primary care provider       Amount and/or Complexity of Data Reviewed  Clinical lab tests: ordered and reviewed  Tests in the radiology section of CPT®: ordered and reviewed  Tests in the medicine section of CPT®: ordered and reviewed  Decide to obtain previous medical records or to obtain history from someone other than the patient: yes  Review and summarize past medical records: yes  Discuss the patient with other providers: yes  Independent visualization of images, tracings, or specimens: yes    Risk of Complications, Morbidity, and/or Mortality  Presenting problems: moderate  Diagnostic procedures: moderate  Management options: moderate  General comments: Elements of this note have been dictated via voice recognition software. Text and phrases may be limited by the accuracy of the software. The chart has been reviewed, but errors may still be present.       Patient Progress  Patient progress: improved         Procedures

## 2021-03-15 ENCOUNTER — ANESTHESIA EVENT (OUTPATIENT)
Dept: ENDOSCOPY | Age: 71
End: 2021-03-15
Payer: MEDICARE

## 2021-03-15 NOTE — PROGRESS NOTES
Attempted to call patient to confirm scheduled procedure however unable to reach patient at this time.

## 2021-03-16 ENCOUNTER — ANESTHESIA (OUTPATIENT)
Dept: ENDOSCOPY | Age: 71
End: 2021-03-16
Payer: MEDICARE

## 2021-03-16 ENCOUNTER — HOSPITAL ENCOUNTER (OUTPATIENT)
Age: 71
Setting detail: OUTPATIENT SURGERY
Discharge: HOME | End: 2021-03-16
Attending: INTERNAL MEDICINE | Admitting: INTERNAL MEDICINE
Payer: MEDICARE

## 2021-03-16 ENCOUNTER — APPOINTMENT (OUTPATIENT)
Dept: GENERAL RADIOLOGY | Age: 71
End: 2021-03-16
Attending: INTERNAL MEDICINE
Payer: MEDICARE

## 2021-03-16 VITALS
DIASTOLIC BLOOD PRESSURE: 81 MMHG | BODY MASS INDEX: 21.67 KG/M2 | TEMPERATURE: 97.7 F | HEART RATE: 64 BPM | HEIGHT: 72 IN | WEIGHT: 160 LBS | RESPIRATION RATE: 14 BRPM | SYSTOLIC BLOOD PRESSURE: 124 MMHG | OXYGEN SATURATION: 96 %

## 2021-03-16 PROCEDURE — 76060000032 HC ANESTHESIA 0.5 TO 1 HR: Performed by: INTERNAL MEDICINE

## 2021-03-16 PROCEDURE — 74011000250 HC RX REV CODE- 250: Performed by: NURSE ANESTHETIST, CERTIFIED REGISTERED

## 2021-03-16 PROCEDURE — 76040000025: Performed by: INTERNAL MEDICINE

## 2021-03-16 PROCEDURE — 74011250636 HC RX REV CODE- 250/636: Performed by: NURSE ANESTHETIST, CERTIFIED REGISTERED

## 2021-03-16 PROCEDURE — 74011250636 HC RX REV CODE- 250/636: Performed by: ANESTHESIOLOGY

## 2021-03-16 PROCEDURE — 2709999900 HC NON-CHARGEABLE SUPPLY: Performed by: INTERNAL MEDICINE

## 2021-03-16 RX ORDER — PROPOFOL 10 MG/ML
INJECTION, EMULSION INTRAVENOUS
Status: DISCONTINUED | OUTPATIENT
Start: 2021-03-16 | End: 2021-03-16 | Stop reason: HOSPADM

## 2021-03-16 RX ORDER — SODIUM CHLORIDE, SODIUM LACTATE, POTASSIUM CHLORIDE, CALCIUM CHLORIDE 600; 310; 30; 20 MG/100ML; MG/100ML; MG/100ML; MG/100ML
100 INJECTION, SOLUTION INTRAVENOUS CONTINUOUS
Status: DISCONTINUED | OUTPATIENT
Start: 2021-03-16 | End: 2021-03-16 | Stop reason: HOSPADM

## 2021-03-16 RX ORDER — LIDOCAINE HYDROCHLORIDE 20 MG/ML
INJECTION, SOLUTION EPIDURAL; INFILTRATION; INTRACAUDAL; PERINEURAL AS NEEDED
Status: DISCONTINUED | OUTPATIENT
Start: 2021-03-16 | End: 2021-03-16 | Stop reason: HOSPADM

## 2021-03-16 RX ORDER — PROPOFOL 10 MG/ML
INJECTION, EMULSION INTRAVENOUS AS NEEDED
Status: DISCONTINUED | OUTPATIENT
Start: 2021-03-16 | End: 2021-03-16 | Stop reason: HOSPADM

## 2021-03-16 RX ADMIN — SODIUM CHLORIDE, SODIUM LACTATE, POTASSIUM CHLORIDE, AND CALCIUM CHLORIDE 100 ML/HR: 600; 310; 30; 20 INJECTION, SOLUTION INTRAVENOUS at 11:08

## 2021-03-16 RX ADMIN — LIDOCAINE HYDROCHLORIDE 100 MG: 20 INJECTION, SOLUTION EPIDURAL; INFILTRATION; INTRACAUDAL; PERINEURAL at 12:35

## 2021-03-16 RX ADMIN — PROPOFOL 50 MG: 10 INJECTION, EMULSION INTRAVENOUS at 12:35

## 2021-03-16 RX ADMIN — PROPOFOL 160 MCG/KG/MIN: 10 INJECTION, EMULSION INTRAVENOUS at 12:35

## 2021-03-16 NOTE — ANESTHESIA POSTPROCEDURE EVALUATION
Procedure(s):  ESOPHAGOGASTRODUODENOSCOPY (EGD) DILATION/ 22/ DR AGUILA JAIME  ESOPHAGEAL DILATION. total IV anesthesia    Anesthesia Post Evaluation      Multimodal analgesia: multimodal analgesia not used between 6 hours prior to anesthesia start to PACU discharge  Patient location during evaluation: PACU  Patient participation: complete - patient participated  Level of consciousness: awake and alert  Pain score: 0  Pain management: adequate  Airway patency: patent  Anesthetic complications: no  Cardiovascular status: acceptable and hemodynamically stable  Respiratory status: acceptable and spontaneous ventilation  Hydration status: acceptable  Post anesthesia nausea and vomiting:  none  Final Post Anesthesia Temperature Assessment:  Normothermia (36.0-37.5 degrees C)      INITIAL Post-op Vital signs:   Vitals Value Taken Time   /78 03/16/21 1307   Temp 36.5 °C (97.7 °F) 03/16/21 1259   Pulse 62 03/16/21 1316   Resp 14 03/16/21 1307   SpO2 100 % 03/16/21 1316   Vitals shown include unvalidated device data.

## 2021-03-16 NOTE — DISCHARGE INSTRUCTIONS
Gastrointestinal Esophagogastroduodenoscopy (EGD) - Upper Exam Discharge Instructions    1. Call Dr. Sharla Trujillo at [phone] for any problems or questions. 2. Contact the doctor's office for follow up appointment as directed. 3. Medication may cause drowsiness for several hours, therefore:  · Do not drive or operate machinery for remainder of the day. · No alcohol today. · Do not make any important or legal decisions for 24 hours. · Do not sign any legal documents for 24 hours. 5. Ordinarily, you may resume regular diet and activity after exam unless otherwise              specified by your physician. 6. For mild soreness in your throat you may use Cepacol throat lozenges or warm               salt-water gargles as needed. Any additional instructions:   1. Soft diet for today. 2. Repeat dilation in 3 months. Instructions given to Elizabeth Francisco and other family members.

## 2021-03-16 NOTE — PROGRESS NOTES
- Visited as requested, to offer spiritual support and prayer prior to g.i. procedure. Pt's wife was present. Listened, as pt reflected on how he had been diagnosed with cancer about 10-12 years ago, and how his treatments had led to his current health concerns. Pt stated that his Davee Needs tremayne is a source of strength and hope.     Gisele , MDiv, [address] [place] [address]

## 2021-03-16 NOTE — H&P
Gastrointestinal Progress Note    3/16/2021    Admit Date: 3/16/2021    Subjective:     Patient is NPO for an EGD/dilatation. Pain: Patient complains of no abdominal pain. Bowel Movements: Normal    Bleeding:  None    Current Facility-Administered Medications   Medication Dose Route Frequency    lactated Ringers infusion  100 mL/hr IntraVENous CONTINUOUS        Objective:     Blood pressure 115/76, pulse 66, temperature 97.8 °F (36.6 °C), resp. rate 18, height 6' (1.829 m), weight 72.6 kg (160 lb), SpO2 100 %. No intake/output data recorded. No intake/output data recorded. EXAM:  HEART: regular rate and rhythm, S1, S2 normal, no murmur, click, rub or gallop, LUNGS: chest clear, no wheezing, rales, normal symmetric air entry, Heart exam - S1, S2 normal, no murmur, no gallop, rate regular and ABDOMEN:  Bowel sounds are normal, liver is not enlarged, spleen is not enlarged    Data Review  No results found for this or any previous visit (from the past 24 hour(s)). 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 Risks (bleed, perforation, infection, untoward CV or resp. Event, mortality, etc.), benefits, and alternatives were discussed with the patient who agrees to proceed.   Susan Shields MD

## 2021-03-17 NOTE — OP NOTES
[facility]  OPERATIVE REPORT    Name:  Lincoln Mccormack  MR#:  [id number]  :  1950  ACCOUNT #:  [de-identified]  DATE OF SERVICE:  2021    ESOPHAGOGASTRODUODENOSCOPY    PREOPERATIVE DIAGNOSIS:  Radiation stricture. POSTOPERATIVE DIAGNOSIS:  Radiation stricture. PROCEDURE PERFORMED:  EGD with Savary dilatation over a guidewire. PRIMARY GASTROENTEROLOGIST:  Maykel Carroll MD    SURGEON:  None. ASSISTANT:  None. ANESTHESIA:  Per MAC anesthesia. COMPLICATIONS:  None. SPECIMENS REMOVED:  To laboratory - none. IMPLANTS:  None. ESTIMATED BLOOD LOSS:  0-1 mL. INSTRUMENT:  GIF-H190 videoscope and Savary dilators (#12, #14, and #16). 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 Upper endoscopy was done today to document the presence of absence of any obvious source of dysphagia and proceed with Savary dilatation. PROCEDURE:  The patient was anesthetized and positioned. The video endoscope was passed through the hypopharynx and esophagus with minimal difficulty. There was some radiation effect to the hypopharyngeal area. The proximal, mid, and distal esophagus were unremarkable except for small hiatal hernia sac. Once past the hiatal hernia sac, the remaining body of the stomach was normal.  The antrum was unremarkable. A retroflexed view of the angularis was normal.  A retroflex view of EG junction and cardia revealed no other specific abnormalities. Attention was then turned to the duodenum. Duodenal bulb and C-loop were normal.  No active ulcer disease was seen. The endoscope was backed into the stomach. Air was decompressed. Under fluoroscopy, a Savary guidewire was advanced into the gastric lumen. The endoscope was removed from the patient.     Over the guidewire, #12, 14, and 16 Savary dilators were serially passed. There was no heme on the dilators. Fluoroscopy was used to maintain good position of the guidewire. After the #16 had passed, the guidewire was removed from the patient. The endoscope was advanced back into the hypopharynx into esophagus. There was some minimal heme to the cricopharyngeus. The mucosa appeared intact. The remaining esophagus and stomach were grossly unremarkable. The endoscope was backed to the stomach, air was decompressed, backed to the esophagus and out of the patient. The vocal cords appeared normal.  The patient tolerated the procedure well and taken to the recovery area in stable condition. IMPRESSION:  1. Proximal radiation stricture - dilated with Savary dilators as described above. 2.  Radiation effect to the hypopharyngeal area. PLAN:  Therapeutic:  Followup dilatation in 3-4 months for routine surveillance and therapeutic dilation.       Isidro Chance MD      [id number]  D:  03/16/2021 13:08  T:  03/16/2021 23:03  JOB #:  [id number]  CC:  [facility]       MD Santino Parker MD

## 2021-06-12 ENCOUNTER — HOSPITAL ENCOUNTER (EMERGENCY)
Age: 71
Discharge: HOME | End: 2021-06-12
Attending: EMERGENCY MEDICINE
Payer: MEDICARE

## 2021-06-12 ENCOUNTER — APPOINTMENT (OUTPATIENT)
Dept: GENERAL RADIOLOGY | Age: 71
End: 2021-06-12
Attending: EMERGENCY MEDICINE
Payer: MEDICARE

## 2021-06-12 VITALS
BODY MASS INDEX: 21.94 KG/M2 | WEIGHT: 162 LBS | DIASTOLIC BLOOD PRESSURE: 78 MMHG | RESPIRATION RATE: 16 BRPM | TEMPERATURE: 98 F | HEART RATE: 79 BPM | SYSTOLIC BLOOD PRESSURE: 133 MMHG | HEIGHT: 72 IN | OXYGEN SATURATION: 100 %

## 2021-06-12 DIAGNOSIS — R55 SYNCOPE AND COLLAPSE: Primary | ICD-10-CM

## 2021-06-12 DIAGNOSIS — S20.211A CONTUSION OF RIB ON RIGHT SIDE, INITIAL ENCOUNTER: ICD-10-CM

## 2021-06-12 PROCEDURE — 99282 EMERGENCY DEPT VISIT SF MDM: CPT

## 2021-06-12 PROCEDURE — 71100 X-RAY EXAM RIBS UNI 2 VIEWS: CPT

## 2021-06-12 RX ORDER — OXYCODONE HYDROCHLORIDE 5 MG/1
5 TABLET ORAL
Qty: 11 TABLET | Refills: 0 | Status: SHIPPED | OUTPATIENT
Start: 2021-06-12 | End: 2021-06-15

## 2021-06-12 NOTE — ED NOTES
I have reviewed discharge instructions with the patient. The patient verbalized understanding. Patient left ED via Discharge Method: ambulatory to Home with self. Opportunity for questions and clarification provided. Patient given 1 scripts. To continue your aftercare when you leave the hospital, you may receive an automated call from our care team to check in on how you are doing. This is a free service and part of our promise to provide the best care and service to meet your aftercare needs.  If you have questions, or wish to unsubscribe from this service please call [phone]. Thank you for Choosing our [address] [place] [address] Emergency Department.

## 2021-06-12 NOTE — DISCHARGE INSTRUCTIONS
Drink plenty of fluids  Take medications as prescribed  Follow-up with your primary care physician  Return to the ER for any new, worsening or life-threatening symptoms

## 2021-06-12 NOTE — ED TRIAGE NOTES
Pt reports he was treated at the dentist and was taking pain medicine for a root canal, later that night he got up to the bathroom and fell x 2, injuring his lower jaw/face and rib posterior ribs. Bruising and pain noted to the posterior ribs.  Pt reports he hit so hard in the face that it broke his partial.

## 2021-06-12 NOTE — ED PROVIDER NOTES
Patient presents to the ER with complaint of pain in his right side of his ribs. Patient reports 2 nights ago he had a syncopal episode. States he been recently prescribed pain medication. States the pain medication made him sick to his stomach and he felt as if he needed to go to have a bowel movement. When he got on the toilet he did not have one but became very lightheaded and dizzy and sweaty. States when he stood up he passed out and hit the right side of his ribs. Denies any head trauma although he did hit the left side of his jaw. He denies any repeat episodes of syncope or lightheadedness. States she has had a passing out episode in the past which was related to him getting blood drawn. His major complaint today is pain on the right side of his ribs. States is worse when he moves or takes a deep breath and bends. Denies any abdominal pain or vomiting. Denies any chest pain or palpitations. States he otherwise feels fine. The history is provided by the patient. Fall  The accident occurred 2 days ago. The fall occurred while standing. He fell from a height of ground level. Point of impact: Right ribs. Pain location: Right ribs. The pain is at a severity of 3/10. The pain is moderate. He was ambulatory at the scene. Pertinent negatives include no fever, no abdominal pain and no vomiting. The symptoms are aggravated by activity and standing. He has tried nothing for the symptoms.         Past Medical History:   Diagnosis Date    Arthritis     osteoporosis in joints    DJD (degenerative joint disease)     back    Dysphagia, unspecified(751.44)     followed by dr Stalin Borrego    Esophageal stricture     has esophagus stretched every 3-4 months    GERD (gastroesophageal reflux disease)     OMEPRAZOLE DAILY- WELL CONTROLLED     History of squamous cell carcinoma 2008    at base of tongue/throat  surg, chemo and radiation    Hypercholesteremia     Hypothyroid     manaaged with medication    Osteoporosis     Peripheral neuropathy     chemo induced    Personal history of colonic polyps     PUD (peptic ulcer disease) ~2015    Throat cancer ([address] Dx 2009    throat-radiation, chemo    Vitamin D deficiency        Past Surgical History:   Procedure Laterality Date    COLONOSCOPY N/A 2019    COLONOSCOPY/BMI 22 performed [facility] ENDOSCOPY    HX CATARACT REMOVAL Bilateral     HX COLONOSCOPY      HX CYST REMOVAL      right hand-ganglion    HX ENDOSCOPY      EGD EVERY 4 MONTHS/ DILATIONS    HX HEENT      cleaned out lymph nodes throat after chemo    HX LUMBAR LAMINECTOMY  2002    L5-S1    HX LYMPH NODE DISSECTION Left 2009    neck    HX TONSILLECTOMY  1972    HX VASCULAR ACCESS  port/ 2008    placed and removal    MT ABDOMEN SURGERY PROC UNLISTED      PEG PLACED  then removed         Family History:   Problem Relation Age of Onset    Cancer Mother         pancreatic    Breast Cancer Mother     Pancreatic Cancer Mother     No Known Problems Brother     No Known Problems Sister     No Known Problems Sister     No Known Problems Sister     No Known Problems Sister     Other Brother         AGENT ORANGE    Diabetes Brother     Lung Disease Brother        Social History     Socioeconomic History    Marital status:      Spouse name: Not on file    Number of children: Not on file    Years of education: Not on file    Highest education level: Not on file   Occupational History    Not on file   Tobacco Use    Smoking status: Former Smoker     Packs/day: 0.50     Years: 13.00     Pack years: 6.50     Quit date:      Years since quittin.4    Smokeless tobacco: Never Used   Vaping Use    Vaping Use: Never used   Substance and Sexual Activity    Alcohol use: No    Drug use: No    Sexual activity: Not on file   Other Topics Concern    Not on file   Social History Narrative    Not on file     Social Determinants of Health     Financial Resource Strain:     Difficulty of Paying Living Expenses:    Food Insecurity:     Worried About Running Out of Food in the Last Year:     [address] in the Last Year:    Transportation Needs:     Lack of Transportation (Medical):  Lack of Transportation (Non-Medical):    Physical Activity:     Days of Exercise per Week:     Minutes of Exercise per Session:    Stress:     Feeling of Stress :    Social Connections:     Frequency of Communication with Friends and Family:     Frequency of Social Gatherings with Friends and Family:     Attends Religion Services:     Active Member of Clubs or Organizations:     Attends Club or Organization Meetings:     Marital Status:    Intimate Partner Violence:     Fear of Current or Ex-Partner:     Emotionally Abused:     Physically Abused:     Sexually Abused: ALLERGIES: Hydrocodone    Review of Systems   Constitutional: Negative for diaphoresis, fatigue and fever. HENT: Negative for congestion, dental problem and voice change. Eyes: Negative for redness and visual disturbance. Respiratory: Negative for chest tightness, shortness of breath and stridor. Cardiovascular: Negative for chest pain and leg swelling. Gastrointestinal: Negative for abdominal pain and vomiting. Endocrine: Negative for polyphagia and polyuria. Genitourinary: Negative for flank pain and frequency. Musculoskeletal: Negative for back pain, gait problem, neck pain and neck stiffness. Skin: Negative for color change, pallor and rash. Neurological: Positive for light-headedness. Negative for dizziness, tremors, seizures and facial asymmetry. Hematological: Negative for adenopathy. Does not bruise/bleed easily. Psychiatric/Behavioral: Negative for behavioral problems and confusion. All other systems reviewed and are negative.       Vitals:    06/12/21 1200   BP: 133/78   Pulse: 79   Resp: 16   Temp: 98 °F (36.7 °C)   SpO2: 100%   Weight: 73.5 kg (162 lb) Height: 6' (1.829 m)            Physical Exam  Vitals and nursing note reviewed. Constitutional:       General: He is not in acute distress. Appearance: Normal appearance. He is not ill-appearing. HENT:      Head: Normocephalic. Jaw: No trismus, pain on movement or malocclusion. Right Ear: External ear normal.      Left Ear: External ear normal.      Nose: Nose normal. No congestion or rhinorrhea. Mouth/Throat:      Mouth: Mucous membranes are moist.   Eyes:      General:         Right eye: No discharge. Left eye: No discharge. Extraocular Movements: Extraocular movements intact. Pupils: Pupils are equal, round, and reactive to light. Cardiovascular:      Rate and Rhythm: Normal rate and regular rhythm. Pulses: Normal pulses. Heart sounds: Normal heart sounds. No murmur heard. No friction rub. No gallop. Pulmonary:      Effort: Pulmonary effort is normal. No respiratory distress. Breath sounds: No stridor. No wheezing or rhonchi. Chest:      Chest wall: Tenderness present. Abdominal:      General: Abdomen is flat. There is no distension. Palpations: Abdomen is soft. There is no mass. Tenderness: There is no abdominal tenderness. Hernia: No hernia is present. Musculoskeletal:      Cervical back: Normal range of motion and neck supple. No rigidity or tenderness. Skin:     Capillary Refill: Capillary refill takes less than 2 seconds. Neurological:      General: No focal deficit present. Mental Status: He is alert and oriented to person, place, and time. Cranial Nerves: No cranial nerve deficit. Sensory: No sensory deficit. Motor: No weakness.    Psychiatric:         Mood and Affect: Mood normal.         Behavior: Behavior normal.          MDM  Number of Diagnoses or Management Options  Diagnosis management comments: Discussed with patient given his reported syncope highly want blood work and further work-up however he reports he is not concerned about this. States he is concerned about his right rib pain. His vital signs are stable here and he is well-appearing ambulating without any hypoxia or tachycardia or hypotension    Will Obtain right rib x-rays. Does have some pain and tenderness to his left jaw but he reports he \"feels fine\". 1:22 PM  X-rays without any obvious rib fractures. Plan to discharge home with symptomatic treatment for rib contusion. Will give patient incentive spirometer. Encourage follow-up with PCP concerning all other complaints. States he has follow-up with his dentist as well    Encourage patient to return to the ER if he has any repeat episodes of syncope. Amount and/or Complexity of Data Reviewed  Tests in the radiology section of CPT®: ordered and reviewed    Risk of Complications, Morbidity, and/or Mortality  Presenting problems: moderate  Diagnostic procedures: low  Management options: low    Patient Progress  Patient progress: stable         Procedures                 Voice dictation software was used during the making of this note. This software is not perfect and grammatical and other typographical errors may be present. This note has been proofread, but may still contain errors.   Tierra Moore MD; 6/12/2021 @1:23 PM   ===================================================================

## 2021-07-26 ENCOUNTER — ANESTHESIA EVENT (OUTPATIENT)
Dept: ENDOSCOPY | Age: 71
End: 2021-07-26
Payer: MEDICARE

## 2021-07-26 PROBLEM — R55 SYNCOPE: Status: ACTIVE | Noted: 2021-07-26

## 2021-07-26 NOTE — PROGRESS NOTES
Pt confirmed earlier arrival time for 1030 procedure. Pt stated he would arrive at [facility] at 0900. Pt stated he has a  who will be present the entire hospital stay.

## 2021-07-27 ENCOUNTER — APPOINTMENT (OUTPATIENT)
Dept: GENERAL RADIOLOGY | Age: 71
End: 2021-07-27
Attending: INTERNAL MEDICINE
Payer: MEDICARE

## 2021-07-27 ENCOUNTER — ANESTHESIA (OUTPATIENT)
Dept: ENDOSCOPY | Age: 71
End: 2021-07-27
Payer: MEDICARE

## 2021-07-27 ENCOUNTER — HOSPITAL ENCOUNTER (OUTPATIENT)
Age: 71
Setting detail: OUTPATIENT SURGERY
Discharge: HOME | End: 2021-07-27
Attending: INTERNAL MEDICINE | Admitting: INTERNAL MEDICINE
Payer: MEDICARE

## 2021-07-27 VITALS
OXYGEN SATURATION: 99 % | HEART RATE: 60 BPM | RESPIRATION RATE: 16 BRPM | SYSTOLIC BLOOD PRESSURE: 135 MMHG | DIASTOLIC BLOOD PRESSURE: 74 MMHG | TEMPERATURE: 97.7 F

## 2021-07-27 LAB
COVID-19 RAPID TEST, COVR: NOT DETECTED
SOURCE, COVRS: NORMAL

## 2021-07-27 PROCEDURE — 74011250636 HC RX REV CODE- 250/636: Performed by: ANESTHESIOLOGY

## 2021-07-27 PROCEDURE — 76040000025: Performed by: INTERNAL MEDICINE

## 2021-07-27 PROCEDURE — 74011250636 HC RX REV CODE- 250/636: Performed by: NURSE ANESTHETIST, CERTIFIED REGISTERED

## 2021-07-27 PROCEDURE — 88312 SPECIAL STAINS GROUP 1: CPT

## 2021-07-27 PROCEDURE — 87081 CULTURE SCREEN ONLY: CPT

## 2021-07-27 PROCEDURE — 87635 SARS-COV-2 COVID-19 AMP PRB: CPT

## 2021-07-27 PROCEDURE — 77030021593 HC FCPS BIOP ENDOSC BSC -A: Performed by: INTERNAL MEDICINE

## 2021-07-27 PROCEDURE — 88305 TISSUE EXAM BY PATHOLOGIST: CPT

## 2021-07-27 PROCEDURE — 2709999900 HC NON-CHARGEABLE SUPPLY: Performed by: INTERNAL MEDICINE

## 2021-07-27 PROCEDURE — 76060000032 HC ANESTHESIA 0.5 TO 1 HR: Performed by: INTERNAL MEDICINE

## 2021-07-27 PROCEDURE — 74011000250 HC RX REV CODE- 250: Performed by: NURSE ANESTHETIST, CERTIFIED REGISTERED

## 2021-07-27 RX ORDER — SODIUM CHLORIDE, SODIUM LACTATE, POTASSIUM CHLORIDE, CALCIUM CHLORIDE 600; 310; 30; 20 MG/100ML; MG/100ML; MG/100ML; MG/100ML
100 INJECTION, SOLUTION INTRAVENOUS CONTINUOUS
Status: DISCONTINUED | OUTPATIENT
Start: 2021-07-27 | End: 2021-07-27 | Stop reason: HOSPADM

## 2021-07-27 RX ORDER — PROPOFOL 10 MG/ML
INJECTION, EMULSION INTRAVENOUS
Status: DISCONTINUED | OUTPATIENT
Start: 2021-07-27 | End: 2021-07-27 | Stop reason: HOSPADM

## 2021-07-27 RX ORDER — LIDOCAINE HYDROCHLORIDE 20 MG/ML
INJECTION, SOLUTION EPIDURAL; INFILTRATION; INTRACAUDAL; PERINEURAL AS NEEDED
Status: DISCONTINUED | OUTPATIENT
Start: 2021-07-27 | End: 2021-07-27 | Stop reason: HOSPADM

## 2021-07-27 RX ORDER — PROPOFOL 10 MG/ML
INJECTION, EMULSION INTRAVENOUS AS NEEDED
Status: DISCONTINUED | OUTPATIENT
Start: 2021-07-27 | End: 2021-07-27 | Stop reason: HOSPADM

## 2021-07-27 RX ADMIN — LIDOCAINE HYDROCHLORIDE 100 MG: 20 INJECTION, SOLUTION EPIDURAL; INFILTRATION; INTRACAUDAL; PERINEURAL at 10:34

## 2021-07-27 RX ADMIN — PROPOFOL 50 MG: 10 INJECTION, EMULSION INTRAVENOUS at 10:34

## 2021-07-27 RX ADMIN — PROPOFOL 160 MCG/KG/MIN: 10 INJECTION, EMULSION INTRAVENOUS at 10:34

## 2021-07-27 RX ADMIN — SODIUM CHLORIDE, POTASSIUM CHLORIDE, SODIUM LACTATE AND CALCIUM CHLORIDE 100 ML/HR: 600; 310; 30; 20 INJECTION, SOLUTION INTRAVENOUS at 10:06

## 2021-07-27 NOTE — ROUTINE PROCESS
VSS. Discharge instructions reviewed with patient and patient's wife, Marisela Jimenez and copy of instructions sent home with patient. Dr. Jayde Barnett  spoke with patient and patient's wife prior to discharge. Questions answered. Discharged via wheelchair, wheeled out by tech. IV discontinued prior to discharge.      Personal items with patient at discharge: Clothing

## 2021-07-27 NOTE — ANESTHESIA POSTPROCEDURE EVALUATION
Procedure(s):  ESOPHAGOGASTRODUODENOSCOPY (EGD) ELATION/ 22/ DR He REQUEST x-ray  ESOPHAGEAL DILATION  ESOPHAGOGASTRODUODENAL (EGD) BIOPSY. total IV anesthesia    Anesthesia Post Evaluation        Patient location during evaluation: PACU  Patient participation: complete - patient participated  Level of consciousness: awake and alert  Pain management: adequate  Airway patency: patent  Anesthetic complications: no  Cardiovascular status: acceptable  Respiratory status: acceptable  Hydration status: acceptable  Post anesthesia nausea and vomiting:  controlled  Final Post Anesthesia Temperature Assessment:  Normothermia (36.0-37.5 degrees C)      INITIAL Post-op Vital signs:   Vitals Value Taken Time   /82 07/27/21 1127   Temp 36.5 °C (97.7 °F) 07/27/21 1100   Pulse 67 07/27/21 1131   Resp 16 07/27/21 1107   SpO2 100 % 07/27/21 1131   Vitals shown include unvalidated device data.

## 2021-07-27 NOTE — DISCHARGE INSTRUCTIONS
Gastrointestinal Esophagogastroduodenoscopy (EGD)/ Endoscopic Ultrasound(EUS)- Upper Exam Discharge Instructions    1. Call Dr. Cecilia Chua  for any problems or questions. 2. Contact the doctor's office for follow up appointment as directed. 3. Medication may cause drowsiness for several hours, therefore, do not drive or operate machinery for remainder of the day. 4. No alcohol today. 5. Do not make any important decisions such as signing legal paperwork. 6. Ordinarily, you may resume regular diet and activity after exam unless otherwise specified by your physician. 7. For mild soreness in your throat you may use Cepacol throat lozenges or warm  salt-water gargles as needed. Any additional instructions: Follow up with office for results of tests. Follow up appointment with Dr. Cecilia Chua. Instructions given to Al Hinton and other family members.

## 2021-07-27 NOTE — ANESTHESIA PREPROCEDURE EVALUATION
Anesthetic History   No history of anesthetic complications            Review of Systems / Medical History  Patient summary reviewed and pertinent labs reviewed    Pulmonary          Smoker (Former )         Neuro/Psych             Comments: Neuropathy Cardiovascular              Hyperlipidemia    Exercise tolerance: >4 METS: Reports he remains fairly active and denied chest pain, SOB, syncope      GI/Hepatic/Renal     GERD: well controlled      PUD    Comments: Dysphagia  Esophageal stricture Endo/Other      Hypothyroidism: well controlled  Arthritis and cancer (base of Tongue s/p CXRT)     Other Findings              Physical Exam    Airway  Mallampati: II  TM Distance: > 6 cm  Neck ROM: normal range of motion   Mouth opening: Normal     Cardiovascular  Regular rate and rhythm,  S1 and S2 normal,  no murmur, click, rub, or gallop             Dental    Dentition: Caps/crowns     Pulmonary  Breath sounds clear to auscultation               Abdominal  GI exam deferred       Other Findings            Anesthetic Plan    ASA: 2  Anesthesia type: total IV anesthesia          Induction: Intravenous  Anesthetic plan and risks discussed with: Patient

## 2021-07-27 NOTE — H&P
Gastrointestinal Progress Note    7/27/2021    Admit Date: 7/27/2021    Subjective:     Patient is NPO for an EGD/dilatation (radiation stricture). Pain: Patient complains of no abdominal pain. Bowel Movements: Normal    Bleeding:  None    Current Facility-Administered Medications   Medication Dose Route Frequency    lactated Ringers infusion  100 mL/hr IntraVENous CONTINUOUS    lactated Ringers infusion  100 mL/hr IntraVENous CONTINUOUS        Objective:     Blood pressure 113/68, pulse 65, temperature 97.7 °F (36.5 °C), resp. rate 14, SpO2 99 %. No intake/output data recorded. No intake/output data recorded. EXAM:  HEART: regular rate and rhythm, S1, S2 normal, no murmur, click, rub or gallop, LUNGS: chest clear, no wheezing, rales, normal symmetric air entry, Heart exam - S1, S2 normal, no murmur, no gallop, rate regular and ABDOMEN:  Bowel sounds are normal, liver is not enlarged, spleen is not enlarged    Data Review    Recent Results (from the past 24 hour(s))   COVID-19 RAPID TEST    Collection Time: 07/27/21  9:33 AM   Result Value Ref Range    Specimen source Nasopharyngeal      COVID-19 rapid test Not detected NOTD         Assessment:     Active Problems:  Dysphagia  History of radiation stricture  Hypothyroidism      Plan:     EGD with dilatation. Risks (Bleed, perforation, infection, untoward CV or pulm. Events, mortality, etc) benefits, and alternatives discussed with patient, who agrees to proceed.   Harry Lyon MD  .

## 2021-07-28 LAB
H PYLORI AG TISS QL IMSTN: NEGATIVE
H PYLORI AG TISS QL IMSTN: NEGATIVE

## 2021-07-28 NOTE — PROGRESS NOTES
Post op call made, patient states that he is \"fine. I've been doing this a long time and wouldn't come anywhere other than [address] [place] [address]. \". Opportunity for questions given, no questions from patient.

## 2021-07-28 NOTE — OP NOTES
[facility]  OPERATIVE REPORT    Name:  Chin Harrison  MR#:  [id number]  :  1950  ACCOUNT #:  [de-identified]  DATE OF SERVICE:  2021    ESOPHAGOGASTRODUODENOSCOPY REPORT    PREOPERATIVE DIAGNOSIS:  Dysphagia associated with proximal radiation stricture. POSTOPERATIVE DIAGNOSES:  1. Dysphagia associated with proximal radiation stricture. 2.  Gastritis. PROCEDURES PERFORMED:  1. EGD with dilatation over a guidewire (Savary dilatation). 2.  EGD with biopsy. 3.  EGD with ANGÉLICA test.    PRIMARY GASTROENTEROLOGIST:  Kellen Bo MD    SURGEON:  None. ASSISTANT:  None. ANESTHESIA:  Per MAC anesthesia. COMPLICATIONS:  None. SPECIMENS REMOVED:  Gastric biopsy/Clotest    IMPLANTS:  None. ESTIMATED BLOOD LOSS:  0-1 mL. INSTRUMENT:  GIF-H190 video endoscope. PROCEDURE:  The patient was anesthetized and positioned. The videoscope was passed through the hypopharynx with minimal difficulty into the esophagus. There were radiation changes in the hypopharyngeal area. Proximal, mid and distal esophagus were unremarkable except for subtle narrowing at the cricopharyngeus. Once inside the stomach, the body and antrum were unremarkable except for erythema involving the greater curvature of the distal body and antrum of the stomach. A retroflexed view of the angularis was normal.  A retroflexed view of the EG junction and cardia revealed no other specific abnormalities. Attention was then turned to the duodenum. The duodenal bulb and C-loop were normal.  The endoscope was brought back into the stomach. Under fluoroscopy, a Savary guidewire was advanced into the gastric lumen and maintained a good position as the endoscope was removed from the patient. The #12, #14, and #16 Savary dilators were serially passed over the guidewire, maintaining good position of the guidewire as the dilators were removed. We advanced over the wire and into the gastric lumen. After the #16 had been passed, there was some heme on the #16 dilator. The dilator and guidewire were removed from the patient. The endoscope was advanced back through the hypopharynx into the esophagus, cricopharyngeus. There was some minimal cricopharyngeal heme, but mucosa appeared intact. Otherwise, the proximal, mid and distal esophagus were unremarkable. The endoscope was brought to the stomach where because of the gastritis, samples of gastric mucosa were obtained for histology and ANGÉLICA test.  After documentation of hemostasis, the endoscope was backed to the proximal stomach, esophagus and out of the patient. The vocal cords appeared normal.  The patient tolerated the procedure well and taken to the recovery area in stable condition. IMPRESSION:  1. Subtle proximal radiation stricture - dilators as described above. 2.  Gastritis - biopsy and ANGÉLICA test performed. PLAN:  Diagnostics:  1. Follow up biopsy and ANGÉLICA test results. Therapeutics:  1. PPI therapy.       Beryl Alfred MD      [email or web address]  D:  07/27/2021 11:18  T:  07/27/2021 21:02  JOB #:  [id number]  CC:  [facility]       MD Sabi Ram MD

## 2021-08-30 PROBLEM — E89.0 POSTABLATIVE HYPOTHYROIDISM: Status: ACTIVE | Noted: 2017-08-24

## 2021-08-30 PROBLEM — D50.0 IRON DEFICIENCY ANEMIA DUE TO CHRONIC BLOOD LOSS: Status: ACTIVE | Noted: 2019-08-27

## 2021-08-30 PROBLEM — E53.8 COBALAMIN DEFICIENCY: Status: ACTIVE | Noted: 2019-08-27

## 2021-11-13 ENCOUNTER — HOSPITAL ENCOUNTER (EMERGENCY)
Age: 71
Discharge: HOME | End: 2021-11-13
Attending: EMERGENCY MEDICINE
Payer: MEDICARE

## 2021-11-13 VITALS
OXYGEN SATURATION: 95 % | BODY MASS INDEX: 21.4 KG/M2 | HEIGHT: 72 IN | HEART RATE: 75 BPM | TEMPERATURE: 99.1 F | RESPIRATION RATE: 18 BRPM | DIASTOLIC BLOOD PRESSURE: 62 MMHG | SYSTOLIC BLOOD PRESSURE: 111 MMHG | WEIGHT: 158 LBS

## 2021-11-13 DIAGNOSIS — U07.1 COVID-19: Primary | ICD-10-CM

## 2021-11-13 PROCEDURE — 74011000258 HC RX REV CODE- 258: Performed by: PHYSICIAN ASSISTANT

## 2021-11-13 PROCEDURE — 99283 EMERGENCY DEPT VISIT LOW MDM: CPT

## 2021-11-13 PROCEDURE — 74011000636 HC RX REV CODE- 636: Performed by: PHYSICIAN ASSISTANT

## 2021-11-13 PROCEDURE — M0243 CASIRIVI AND IMDEVI INFUSION: HCPCS

## 2021-11-13 RX ADMIN — CASIRIVIMAB AND IMDEVIMAB: 600; 600 INJECTION, SOLUTION, CONCENTRATE INTRAVENOUS at 12:30

## 2021-11-13 NOTE — ED NOTES
I have reviewed discharge instructions with the patient. The patient verbalized understanding. Patient left ED via Discharge Method: ambulatory to Home with self    Opportunity for questions and clarification provided. Patient given 0 scripts. To continue your aftercare when you leave the hospital, you may receive an automated call from our care team to check in on how you are doing. This is a free service and part of our promise to provide the best care and service to meet your aftercare needs.  If you have questions, or wish to unsubscribe from this service please call [phone]. Thank you for Choosing our [facility] Emergency Department.

## 2021-11-13 NOTE — ED PROVIDER NOTES
62-year-old male who presents to the emergency room with chief complaint of Covid positive status. He had a Covid test on Thursday secondary to preadmission for endoscopy here in this hospital, was notified of his positive results last night. Presents here today for Regeneron treatment. His symptoms include cough and body aches, associate with some mild chills since yesterday. No other medical complaints.            Past Medical History:   Diagnosis Date    Arthritis     osteoporosis in joints    DJD (degenerative joint disease)     back    Dysphagia, unspecified(787.20)     followed by dr Nathalia Miller    Esophageal stricture     has esophagus stretched every 3-4 months    GERD (gastroesophageal reflux disease)     OMEPRAZOLE DAILY- WELL CONTROLLED     History of squamous cell carcinoma 2008    at base of tongue/throat  surg, chemo and radiation    Hypercholesteremia     Hypothyroid     manaaged with medication    Osteoporosis     Peripheral neuropathy     chemo induced    Personal history of colonic polyps     PUD (peptic ulcer disease) ~2015    Throat cancer ([facility] [address] Dx 2009    throat-radiation, chemo    Vitamin D deficiency        Past Surgical History:   Procedure Laterality Date    COLONOSCOPY N/A 12/2/2019    COLONOSCOPY/BMI 22 performed by Carlton Smallwood MD at [facility] ENDOSCOPY    HX CATARACT REMOVAL Bilateral     HX COLONOSCOPY      HX CYST REMOVAL      right hand-ganglion    HX ENDOSCOPY      EGD EVERY 4 MONTHS/ DILATIONS    HX HEENT      cleaned out lymph nodes throat after chemo    HX LUMBAR LAMINECTOMY  2002    L5-S1    HX LYMPH NODE DISSECTION Left 2009    neck    HX TONSILLECTOMY  1972    HX VASCULAR ACCESS  port/ 2008    placed and removal    HI ABDOMEN SURGERY PROC UNLISTED  2009    PEG PLACED  then removed         Family History:   Problem Relation Age of Onset    Cancer Mother         pancreatic    Breast Cancer Mother     Pancreatic Cancer Mother     No Known Problems Brother     No Known Problems Sister     No Known Problems Sister     No Known Problems Sister     No Known Problems Sister     Other Brother         AGENT ORANGE    Diabetes Brother     Lung Disease Brother        Social History     Socioeconomic History    Marital status:      Spouse name: Not on file    Number of children: Not on file    Years of education: Not on file    Highest education level: Not on file   Occupational History    Not on file   Tobacco Use    Smoking status: Former Smoker     Packs/day: 0.50     Years: 13.00     Pack years: 6.50     Quit date:      Years since quittin.7    Smokeless tobacco: Never Used   Vaping Use    Vaping Use: Never used   Substance and Sexual Activity    Alcohol use: No    Drug use: No    Sexual activity: Not on file   Other Topics Concern    Not on file   Social History Narrative    Not on file     Social Determinants of Health     Financial Resource Strain:     Difficulty of Paying Living Expenses: Not on file   Food Insecurity:     Worried About [address] in the Last Year: Not on file    [address] in the Last Year: Not on file   Transportation Needs:     Lack of Transportation (Medical): Not on file    Lack of Transportation (Non-Medical):  Not on file   Physical Activity:     Days of Exercise per Week: Not on file    Minutes of Exercise per Session: Not on file   Stress:     Feeling of Stress : Not on file   Social Connections:     Frequency of Communication with Friends and Family: Not on file    Frequency of Social Gatherings with Friends and Family: Not on file    Attends [employer] Services: Not on file    Active Member of Clubs or Organizations: Not on file    Attends Club or Organization Meetings: Not on file    Marital Status: Not on file   Intimate Partner Violence:     Fear of Current or Ex-Partner: Not on file    Emotionally Abused: Not on file    Physically Abused: Not on file   [facility] Sexually Abused: Not on file   Housing Stability:     Unable to Pay for Housing in the Last Year: Not on file    Number of [place] in the Last Year: Not on file    Unstable Housing in the Last Year: Not on file         ALLERGIES: Hydrocodone and Tramadol    Review of Systems   Constitutional: Positive for chills and fatigue. Negative for fever. HENT: Positive for sinus pain. Respiratory: Positive for cough. Negative for shortness of breath. Cardiovascular: Negative for chest pain. Gastrointestinal: Negative for constipation, nausea and vomiting. Genitourinary: Negative for flank pain. Musculoskeletal: Negative for back pain. Neurological: Positive for headaches. All other systems reviewed and are negative. Vitals:    11/13/21 1132 11/13/21 1212   BP: 99/64    Pulse: 99    Resp: 16    Temp: 98.6 °F (37 °C)    SpO2: 97% 97%   Weight: 71.7 kg (158 lb)    Height: 6' (1.829 m)             Physical Exam  Vitals and nursing note reviewed. Constitutional:       General: He is not in acute distress. Appearance: Normal appearance. He is normal weight. He is not ill-appearing, toxic-appearing or diaphoretic. HENT:      Head: Normocephalic and atraumatic. Right Ear: Tympanic membrane normal.      Mouth/Throat:      Mouth: Mucous membranes are moist.   Eyes:      Pupils: Pupils are equal, round, and reactive to light. Cardiovascular:      Rate and Rhythm: Normal rate. Pulmonary:      Effort: Pulmonary effort is normal.      Breath sounds: Normal breath sounds. Abdominal:      General: Abdomen is flat. Musculoskeletal:         General: Normal range of motion. Skin:     General: Skin is warm. Neurological:      Mental Status: He is alert. MDM  Number of Diagnoses or Management Options  COVID-19: minor  Diagnosis management comments: Receive Regeneron infusion. Patient had no reaction.   Patient discharged home with instructions to follow-up with his primary care physician or return here for worsening or worrisome symptoms. Voice dictation software was used during the making of this note. This software is not perfect and grammatical and other typographical errors may be present. This note has been proofread, but may still contain errors.    Terry Ho PA-C     Risk of Complications, Morbidity, and/or Mortality  Presenting problems: moderate  Diagnostic procedures: moderate  Management options: moderate    Patient Progress  Patient progress: improved         Procedures

## 2021-11-13 NOTE — ED TRIAGE NOTES
Patient ambulated into triage without difficulty. Patient was tested for covid Thursday 11/11/2021 for an upcoming EGD. Patient received Covid positive results last night and was told to go to ED for Antibodies. Patient is unvaccinated for covid.

## 2021-12-27 ENCOUNTER — ANESTHESIA EVENT (OUTPATIENT)
Dept: ENDOSCOPY | Age: 71
End: 2021-12-27
Payer: MEDICARE

## 2021-12-28 ENCOUNTER — ANESTHESIA (OUTPATIENT)
Dept: ENDOSCOPY | Age: 71
End: 2021-12-28
Payer: MEDICARE

## 2021-12-28 ENCOUNTER — HOSPITAL ENCOUNTER (OUTPATIENT)
Age: 71
Setting detail: OUTPATIENT SURGERY
Discharge: HOME | End: 2021-12-28
Attending: INTERNAL MEDICINE | Admitting: INTERNAL MEDICINE
Payer: MEDICARE

## 2021-12-28 VITALS
RESPIRATION RATE: 16 BRPM | DIASTOLIC BLOOD PRESSURE: 75 MMHG | SYSTOLIC BLOOD PRESSURE: 124 MMHG | TEMPERATURE: 97.8 F | BODY MASS INDEX: 20.59 KG/M2 | HEIGHT: 72 IN | HEART RATE: 67 BPM | WEIGHT: 152 LBS | OXYGEN SATURATION: 100 %

## 2021-12-28 PROCEDURE — 76040000025: Performed by: INTERNAL MEDICINE

## 2021-12-28 PROCEDURE — 74011250636 HC RX REV CODE- 250/636

## 2021-12-28 PROCEDURE — 74011250636 HC RX REV CODE- 250/636: Performed by: ANESTHESIOLOGY

## 2021-12-28 PROCEDURE — 2709999900 HC NON-CHARGEABLE SUPPLY: Performed by: INTERNAL MEDICINE

## 2021-12-28 PROCEDURE — 76060000031 HC ANESTHESIA FIRST 0.5 HR: Performed by: INTERNAL MEDICINE

## 2021-12-28 PROCEDURE — 74011000250 HC RX REV CODE- 250

## 2021-12-28 RX ORDER — LIDOCAINE HYDROCHLORIDE 20 MG/ML
INJECTION, SOLUTION EPIDURAL; INFILTRATION; INTRACAUDAL; PERINEURAL AS NEEDED
Status: DISCONTINUED | OUTPATIENT
Start: 2021-12-28 | End: 2021-12-28 | Stop reason: HOSPADM

## 2021-12-28 RX ORDER — PROPOFOL 10 MG/ML
INJECTION, EMULSION INTRAVENOUS AS NEEDED
Status: DISCONTINUED | OUTPATIENT
Start: 2021-12-28 | End: 2021-12-28 | Stop reason: HOSPADM

## 2021-12-28 RX ORDER — SODIUM CHLORIDE, SODIUM LACTATE, POTASSIUM CHLORIDE, CALCIUM CHLORIDE 600; 310; 30; 20 MG/100ML; MG/100ML; MG/100ML; MG/100ML
1000 INJECTION, SOLUTION INTRAVENOUS CONTINUOUS
Status: DISCONTINUED | OUTPATIENT
Start: 2021-12-28 | End: 2021-12-28 | Stop reason: HOSPADM

## 2021-12-28 RX ORDER — SODIUM CHLORIDE, SODIUM LACTATE, POTASSIUM CHLORIDE, CALCIUM CHLORIDE 600; 310; 30; 20 MG/100ML; MG/100ML; MG/100ML; MG/100ML
100 INJECTION, SOLUTION INTRAVENOUS CONTINUOUS
Status: DISCONTINUED | OUTPATIENT
Start: 2021-12-28 | End: 2021-12-28 | Stop reason: HOSPADM

## 2021-12-28 RX ORDER — SODIUM CHLORIDE, SODIUM LACTATE, POTASSIUM CHLORIDE, CALCIUM CHLORIDE 600; 310; 30; 20 MG/100ML; MG/100ML; MG/100ML; MG/100ML
25 INJECTION, SOLUTION INTRAVENOUS CONTINUOUS
Status: DISCONTINUED | OUTPATIENT
Start: 2021-12-28 | End: 2021-12-28 | Stop reason: HOSPADM

## 2021-12-28 RX ORDER — PROPOFOL 10 MG/ML
INJECTION, EMULSION INTRAVENOUS
Status: DISCONTINUED | OUTPATIENT
Start: 2021-12-28 | End: 2021-12-28 | Stop reason: HOSPADM

## 2021-12-28 RX ADMIN — SODIUM CHLORIDE, SODIUM LACTATE, POTASSIUM CHLORIDE, AND CALCIUM CHLORIDE 100 ML/HR: 600; 310; 30; 20 INJECTION, SOLUTION INTRAVENOUS at 11:59

## 2021-12-28 RX ADMIN — LIDOCAINE HYDROCHLORIDE 100 MG: 20 INJECTION, SOLUTION EPIDURAL; INFILTRATION; INTRACAUDAL; PERINEURAL at 12:10

## 2021-12-28 RX ADMIN — PROPOFOL 50 MG: 10 INJECTION, EMULSION INTRAVENOUS at 12:10

## 2021-12-28 RX ADMIN — PROPOFOL 140 MCG/KG/MIN: 10 INJECTION, EMULSION INTRAVENOUS at 12:10

## 2021-12-28 NOTE — H&P
Preoperative H&P    Patient is here for an EGD with dilation for a history of radiation induced stricture of the esophagus. Patient is normally followed by Dr. Ricardo Melendrez. Last EGD was done on 7/28/21 by Dr. Ricardo Melendrez at which time esophagus was dilated using a 12, 14 and 16 mm Savary. Visit Vitals  /73   Pulse 77   Temp 98.1 °F (36.7 °C)   Resp 17   Ht 6' (1.829 m)   Wt 68.9 kg (152 lb)   SpO2 99%   BMI 20.61 kg/m²     GEN: Lying in bed in NAD  RESP: comfortable on room air  CV: RRR  ABD: Soft, NT, ND  NEURO: awake and interactive  PSYCH: normal mood    Endoscopy and risks explained to the patient. Risks including reaction to sedation, cardiopulmonary events, infection, bleeding, perforation, requirement for surgery if complications should occur, death were explained to patient who expressed understanding and agreed to proceed with endoscopy.     Jagdeep Herrera MD   [facility]

## 2021-12-28 NOTE — DISCHARGE INSTRUCTIONS
Gastrointestinal Esophagogastroduodenoscopy (EGD)/ Endoscopic Ultrasound(EUS)- Upper Exam Discharge Instructions    1. Call Dr. Jac Mcgovern [phone] for any problems or questions. 2. Contact the doctor's office for follow up appointment as directed. 3. Medication may cause drowsiness for several hours, therefore, do not drive or operate machinery for remainder of the day. 4. No alcohol today. 5. Do not make legal decisions today. 6. Ordinarily, you may resume regular diet and activity after exam unless otherwise specified by your physician. 7. For mild soreness in your throat you may use Cepacol throat lozenges or warm  salt-water gargles as needed.     Any additional instructions:    Repeat EGD with dilation PRN      Instructions given to Shaina Childs and other family members

## 2021-12-28 NOTE — ANESTHESIA PREPROCEDURE EVALUATION
Anesthetic History   No history of anesthetic complications            Review of Systems / Medical History  Patient summary reviewed and pertinent labs reviewed    Pulmonary          Smoker (Former )         Neuro/Psych             Comments: Neuropathy Cardiovascular              Hyperlipidemia    Exercise tolerance: >4 METS: Reports he remains fairly active and denied chest pain, SOB, syncope      GI/Hepatic/Renal     GERD: well controlled      PUD    Comments: Dysphagia  Esophageal stricture Endo/Other      Hypothyroidism: well controlled  Arthritis and cancer (base of Tongue s/p CXRT)     Other Findings   Comments: Cancer base of tongue           Physical Exam    Airway  Mallampati: II  TM Distance: > 6 cm  Neck ROM: normal range of motion   Mouth opening: Normal     Cardiovascular  Regular rate and rhythm,  S1 and S2 normal,  no murmur, click, rub, or gallop             Dental    Dentition: Caps/crowns     Pulmonary  Breath sounds clear to auscultation               Abdominal  GI exam deferred       Other Findings            Anesthetic Plan    ASA: 3  Anesthesia type: total IV anesthesia          Induction: Intravenous  Anesthetic plan and risks discussed with: Patient

## 2021-12-28 NOTE — ANESTHESIA POSTPROCEDURE EVALUATION
Procedure(s):  ESOPHAGOGASTRODUODENOSCOPY (EGD)/ BMI 22  ESOPHAGEAL DILATION. total IV anesthesia    Anesthesia Post Evaluation      Multimodal analgesia: multimodal analgesia not used between 6 hours prior to anesthesia start to PACU discharge  Patient location during evaluation: PACU  Patient participation: complete - patient participated  Level of consciousness: awake and alert  Pain management: adequate  Airway patency: patent  Anesthetic complications: no  Cardiovascular status: hemodynamically stable  Respiratory status: acceptable  Hydration status: acceptable        INITIAL Post-op Vital signs:   Vitals Value Taken Time   /75 12/28/21 1250   Temp 36.6 °C (97.8 °F) 12/28/21 1221   Pulse 69 12/28/21 1256   Resp 16 12/28/21 1250   SpO2 100 % 12/28/21 1256   Vitals shown include unvalidated device data.

## 2021-12-28 NOTE — PROCEDURES
ESOPHAGOGASTRODUODENOSCOPY        DATE of PROCEDURE: 12/28/2021    PT NAME: Stalin Sevilla  [id number]    PHYSICIAN:  Erik Lynch MD    MEDICATION:  MAC    INSTRUMENT: GIFQ    PROCEDURE:  EGD with dilation of the esophagus using 55 Fr, 50 Fr and 48 Fr Gonzalez, EGD diagnostic    INDICATIONS: Dysphagia, Hx of Esophageal Stricture    FINDINGS:  OROPHARYNX: Cords and pyriform recesses normal.  ESOPHAGUS: There is an esophageal stricture in the proximal esophagus. It was open and allowed passage of the endoscope with mild resistance. The remainder of the esophagus was normal.  I dilated the esophagus with a 55 Fr, 48 Fr and finally 50 Fr Gonzalez. Re-endoscopy after each dilation was done and only small heme was seen in the proximal esophagus with the 50 Fr dilation. STOMACH: The fundus on antegrade and retroflex views is normal. The body, antrum and pylorus is normal.  DUODENUM: The bulb and second portions are normal.    ASSESSMENT:  1. Proximal Esophageal Stricture dilated with 46 Fr, 48 Fr and 50 Fr Gonzalez    PLAN:  1.  Repeat EGD with dilation PRN    Erik Lynch MD  [facility]

## 2022-03-18 PROBLEM — E53.8 COBALAMIN DEFICIENCY: Status: ACTIVE | Noted: 2019-08-27

## 2022-03-19 PROBLEM — D50.0 IRON DEFICIENCY ANEMIA DUE TO CHRONIC BLOOD LOSS: Status: ACTIVE | Noted: 2019-08-27

## 2022-03-19 PROBLEM — E89.0 POSTABLATIVE HYPOTHYROIDISM: Status: ACTIVE | Noted: 2017-08-24

## 2022-03-20 PROBLEM — R55 SYNCOPE: Status: ACTIVE | Noted: 2021-07-26

## 2022-04-15 NOTE — PROGRESS NOTES
Called and confirmed scheduled procedure for patient. Informed on patients arrival time (1030), entry location  ([address] [place] [address]), and  policy. Opportunity for questions provided, no voiced concerns.

## 2022-04-18 ENCOUNTER — ANESTHESIA (OUTPATIENT)
Dept: ENDOSCOPY | Age: 72
End: 2022-04-18
Payer: MEDICARE

## 2022-04-18 ENCOUNTER — HOSPITAL ENCOUNTER (OUTPATIENT)
Age: 72
Setting detail: OUTPATIENT SURGERY
Discharge: HOME | End: 2022-04-18
Attending: INTERNAL MEDICINE | Admitting: INTERNAL MEDICINE
Payer: MEDICARE

## 2022-04-18 ENCOUNTER — ANESTHESIA EVENT (OUTPATIENT)
Dept: ENDOSCOPY | Age: 72
End: 2022-04-18
Payer: MEDICARE

## 2022-04-18 VITALS
HEART RATE: 72 BPM | HEIGHT: 72 IN | TEMPERATURE: 98 F | BODY MASS INDEX: 20.18 KG/M2 | RESPIRATION RATE: 14 BRPM | DIASTOLIC BLOOD PRESSURE: 82 MMHG | WEIGHT: 149 LBS | OXYGEN SATURATION: 100 % | SYSTOLIC BLOOD PRESSURE: 124 MMHG

## 2022-04-18 PROCEDURE — 74011000250 HC RX REV CODE- 250: Performed by: NURSE ANESTHETIST, CERTIFIED REGISTERED

## 2022-04-18 PROCEDURE — 88305 TISSUE EXAM BY PATHOLOGIST: CPT

## 2022-04-18 PROCEDURE — 76060000031 HC ANESTHESIA FIRST 0.5 HR: Performed by: INTERNAL MEDICINE

## 2022-04-18 PROCEDURE — 77030009426 HC FCPS BIOP ENDOSC BSC -B: Performed by: INTERNAL MEDICINE

## 2022-04-18 PROCEDURE — 74011250636 HC RX REV CODE- 250/636: Performed by: INTERNAL MEDICINE

## 2022-04-18 PROCEDURE — 88312 SPECIAL STAINS GROUP 1: CPT

## 2022-04-18 PROCEDURE — 74011250636 HC RX REV CODE- 250/636: Performed by: NURSE ANESTHETIST, CERTIFIED REGISTERED

## 2022-04-18 PROCEDURE — 76040000025: Performed by: INTERNAL MEDICINE

## 2022-04-18 PROCEDURE — 2709999900 HC NON-CHARGEABLE SUPPLY: Performed by: INTERNAL MEDICINE

## 2022-04-18 RX ORDER — SODIUM CHLORIDE, SODIUM LACTATE, POTASSIUM CHLORIDE, CALCIUM CHLORIDE 600; 310; 30; 20 MG/100ML; MG/100ML; MG/100ML; MG/100ML
100 INJECTION, SOLUTION INTRAVENOUS CONTINUOUS
Status: DISCONTINUED | OUTPATIENT
Start: 2022-04-18 | End: 2022-04-18 | Stop reason: HOSPADM

## 2022-04-18 RX ORDER — LIDOCAINE HYDROCHLORIDE 20 MG/ML
INJECTION, SOLUTION EPIDURAL; INFILTRATION; INTRACAUDAL; PERINEURAL AS NEEDED
Status: DISCONTINUED | OUTPATIENT
Start: 2022-04-18 | End: 2022-04-18 | Stop reason: HOSPADM

## 2022-04-18 RX ORDER — PROPOFOL 10 MG/ML
INJECTION, EMULSION INTRAVENOUS
Status: DISCONTINUED | OUTPATIENT
Start: 2022-04-18 | End: 2022-04-18 | Stop reason: HOSPADM

## 2022-04-18 RX ORDER — PROPOFOL 10 MG/ML
INJECTION, EMULSION INTRAVENOUS AS NEEDED
Status: DISCONTINUED | OUTPATIENT
Start: 2022-04-18 | End: 2022-04-18 | Stop reason: HOSPADM

## 2022-04-18 RX ADMIN — SODIUM CHLORIDE, POTASSIUM CHLORIDE, SODIUM LACTATE AND CALCIUM CHLORIDE 100 ML/HR: 600; 310; 30; 20 INJECTION, SOLUTION INTRAVENOUS at 11:35

## 2022-04-18 RX ADMIN — LIDOCAINE HYDROCHLORIDE 100 MG: 20 INJECTION, SOLUTION EPIDURAL; INFILTRATION; INTRACAUDAL; PERINEURAL at 12:14

## 2022-04-18 RX ADMIN — PROPOFOL 60 MG: 10 INJECTION, EMULSION INTRAVENOUS at 12:14

## 2022-04-18 RX ADMIN — PROPOFOL 200 MCG/KG/MIN: 10 INJECTION, EMULSION INTRAVENOUS at 12:14

## 2022-04-18 NOTE — ROUTINE PROCESS
VSS. Discharge instructions reviewed with patient and wife and copy of instructions sent home with patient. Dr. Herman Blackburn spoke with patient and wife prior to discharge. Questions answered. Discharged via car, wheeled out by [employer]. IV discontinued prior to discharge.      Personal items with patient at discharge: clothing, dentures, mask

## 2022-04-18 NOTE — ANESTHESIA POSTPROCEDURE EVALUATION
Procedure(s):  ESOPHAGOGASTRODUODENOSCOPY (EGD)/BMI 21  ESOPHAGOGASTRODUODENAL (EGD) BIOPSY  ESOPHAGEAL DILATION. total IV anesthesia    Anesthesia Post Evaluation      Multimodal analgesia: multimodal analgesia used between 6 hours prior to anesthesia start to PACU discharge  Patient location during evaluation: PACU  Patient participation: complete - patient participated  Level of consciousness: awake  Pain management: adequate  Airway patency: patent  Anesthetic complications: no  Cardiovascular status: acceptable  Respiratory status: acceptable  Hydration status: acceptable  Post anesthesia nausea and vomiting:  none      INITIAL Post-op Vital signs:   Vitals Value Taken Time   /82 04/18/22 1257   Temp 36.7 °C (98 °F) 04/18/22 1227   Pulse 70 04/18/22 1300   Resp 14 04/18/22 1257   SpO2 100 % 04/18/22 1300   Vitals shown include unvalidated device data.

## 2022-04-18 NOTE — OP NOTES
Procedure:  Esophagogastroduodenoscopy    Date of Procedure:  4/18/2022    Patient:  Norah Harmon     1950    Indication:  Dysphagia, iron deficiency anemia     Sedation:  MAC    Pre-Procedure Physical Exam:    Mental status:  alert and oriented  Airway:  normal oropharyngeal airway and neck mobility  CV:  regular rate and rhythm  Respiratory:  clear to auscultation    Procedure:  A History and Physical has been performed, and patient medication allergies have been reviewed. Risks of perforation, hemorrhage, adverse drug reaction, and aspiration were discussed. Informed consent was obtained for the procedure, including sedation. The patient was placed in the left lateral decubitus position. The heart rate, oxygen saturations, blood pressure, and response to care were monitored throughout the procedure. The gastroscope was passed through the mouth and advanced under direct vision to the second portion of the duodenum. A careful inspection was made as the gastroscope was withdrawn, including a retroflexed view of the proximal stomach. The patient tolerated the procedure well. Findings:     OROPHARYNX: Cords and pyriform recesses appear normal.   ESOPHAGUS: A benign-appearing intrinsic stenosis is seen in the proximal esophagus. Serial dilation was performed using 48-52 Fr Gonzalez dilators. Successful dilation was confirmed by the presence of mucosal disruption. STOMACH: On retroflexion, the flap-valve is classified as Hill Grade III, with a tissue fold present at the lesser curvature that is not prominent and an open hiatus. A sliding-type hiatal hernia is seen with axial height of 1 cm and transverse width of 1 cm. Severe gastritis was seen in the body and antrum. This was characterized by erythematous, friable mucosa and dispersed erosions. Biopsies of the body and antrum were obtained for H pylori.    DUODENUM: The bulb and second portions are normal.    Specimen: Yes    Estimated Blood Loss:  Minimal    Implant:  None           Impression:    1. Proximal esophageal stricture. Dilated. 2. Small hiatal hernia. 3. Erosive gastritis. Biopsied. Plan:  1. Soft diet today. 2. Advance diet tomorrow as tolerated. 3. Omeprazole 40 mg daily. 4. Avoid NSAIDs. 5. Follow-up pathology results. 6. Return to office in 2-2 months.      Signed:  Radha Cruz MD  4/18/2022  12:23 PM

## 2022-04-18 NOTE — DISCHARGE INSTRUCTIONS
Gastrointestinal Esophagogastroduodenoscopy (EGD)/ Endoscopic Ultrasound(EUS)- Upper Exam Discharge Instructions    1. Call Dr. Trae Cruz  for any problems or questions. 2. Contact the doctor's office for follow up appointment as directed. 3. Medication may cause drowsiness for several hours, therefore, do not drive or operate machinery for remainder of the day. 4. No alcohol today. 5. Do not make any important decisions such as signing legal paperwork. 6. Ordinarily, you may resume activity after exam unless otherwise specified by your physician. 7. For mild soreness in your throat you may use Cepacol throat lozenges or warm  salt-water gargles as needed. Any additional instructions:   1. Soft diet today. 2. Advance diet tomorrow as tolerated. 3. Omeprazole 40 mg daily. 4. Avoid NSAIDs such as Aleve, Motrin, Naproxen, Goodie Powder, Advil. 5. Follow-up pathology results. 6. Return to office in 2 months. Instructions given to Radha Barillas and other family members.

## 2022-04-18 NOTE — H&P
History and Physical for Procedure             Date: 4/18/2022     History of Present Illness:  Patient presents to undergo EGD.        Past Medical History:   Diagnosis Date    Arthritis     osteoporosis in joints    COVID 11/13/2021    no hospitalization    DJD (degenerative joint disease)     back    Dysphagia, unspecified(787.20)     followed by dr Bernie Bray Esophageal stricture     has esophagus stretched every 3-4 months    GERD (gastroesophageal reflux disease)     OMEPRAZOLE DAILY- WELL CONTROLLED     History of squamous cell carcinoma 2008    at base of tongue/throat  surg, chemo and radiation    Hypercholesteremia     Hypothyroid     manaaged with medication    Osteoporosis     Peripheral neuropathy     chemo induced    Personal history of colonic polyps     PUD (peptic ulcer disease) ~2015    Throat cancer ([address] Dx 2009    throat-radiation, chemo    Vitamin D deficiency      Past Surgical History:   Procedure Laterality Date    COLONOSCOPY N/A 12/2/2019    COLONOSCOPY/BMI 22 performed by Sisi Earl [facility] at [facility] ENDOSCOPY    HX CATARACT REMOVAL Bilateral     HX COLONOSCOPY      HX CYST REMOVAL      right hand-ganglion    HX ENDOSCOPY      EGD EVERY 4 MONTHS/ DILATIONS    HX HEENT      cleaned out lymph nodes throat after chemo    HX LUMBAR LAMINECTOMY  2002    HX LYMPH NODE DISSECTION Left 2009    neck    HX TONSILLECTOMY  1972    HX VASCULAR ACCESS  port/ 2008    placed and removal    ID ABDOMEN SURGERY PROC UNLISTED  2009    PEG PLACED  then removed     In and  Family History   Problem Relation Age of Onset    Cancer Mother         pancreatic    Breast Cancer Mother     Pancreatic Cancer Mother     No Known Problems Brother     No Known Problems Sister     No Known Problems Sister     No Known Problems Sister     No Known Problems Sister     Other Brother         AGENT ORANGE    Diabetes Brother     Lung Disease Brother      Social History     Tobacco Use    Smoking status: Former Smoker     Packs/day: 0.50     Years: 13.00     Pack years: 6.50     Quit date:      Years since quittin.3    Smokeless tobacco: Never Used   Substance Use Topics    Alcohol use: No        Allergies   Allergen Reactions    Hydrocodone Itching    Tramadol Other (comments)     Dizziness, passed out      No current facility-administered medications for this encounter. Current Outpatient Medications   Medication Sig    celecoxib (CELEBREX) 200 mg capsule Take 1 Capsule by mouth daily.  cyclobenzaprine (FLEXERIL) 5 mg tablet Take 1 Tablet by mouth two (2) times a day.  gabapentin (NEURONTIN) 300 mg capsule Take one capsule daily before dinner    omeprazole (PRILOSEC) 40 mg capsule TAKE 1 CAPSULE EVERY MORNING    DULoxetine (CYMBALTA) 60 mg capsule TAKE 1 CAPSULE EVERY DAY    atorvastatin (LIPITOR) 10 mg tablet TAKE 1 TABLET NIGHTLY FOR HYPERCHOLESTEROLEMIA    levothyroxine (SYNTHROID) 50 mcg tablet TAKE 1 TABLET EVERY DAY    PreviDent 5000 Booster Plus 1.1 % pste USE AS DIRECTED    ferrous sulfate (IRON) 325 mg (65 mg iron) tablet Take  by mouth every evening.  lutein 6 mg tab Take 20 mg by mouth daily.  MULTIVITAMIN (MULTIPLE VITAMINS PO) Take 0.5 Tabs by mouth daily.  aspirin delayed-release 81 mg tablet Take 81 mg by mouth nightly. Indications: prevention of transient ischemic attack, CONTINUED        Review of Systems:  A detailed 10 organ review of systems is obtained with pertinent positives as listed in the History of Present Illness. All others are negative. Objective:     Physical Exam:  There were no vitals taken for this visit. General:  Alert and oriented. Heart: Regular rate and rhythm  Lungs:  Clear to auscultation bilaterally  Abdomen: Soft, nontender, nondistended    Impression/Plan:     Proceed with EGD as planned.  I have discussed with the patient the technique, benefits, alternatives, and risks of these procedures, including medication reaction, immediate or delayed bleeding, or perforation of the gastrointestinal tract.      Signed By: Nell Angelucci, MD     April 18, 2022

## 2022-04-18 NOTE — PROGRESS NOTES
Spiritual Care visit. Initial Visit, Pre Surgery Consult. Visit and prayer before patient goes to surgery.     Visit by Precious Mendoza M.Ed., Th.B. ,Staff

## 2022-04-18 NOTE — ANESTHESIA PREPROCEDURE EVALUATION
Anesthetic History   No history of anesthetic complications            Review of Systems / Medical History  Patient summary reviewed and pertinent labs reviewed    Pulmonary              Pertinent negatives: No smoker (Former )     Neuro/Psych             Comments: Neuropathy Cardiovascular              Hyperlipidemia    Exercise tolerance: >4 METS: Reports he remains fairly active and denied chest pain, SOB, syncope      GI/Hepatic/Renal     GERD: well controlled      PUD    Comments: Dysphagia  Esophageal stricture Endo/Other      Hypothyroidism: well controlled  Arthritis and cancer (base of Tongue s/p CXRT)     Other Findings   Comments: Cancer base of tongue           Physical Exam    Airway  Mallampati: II  TM Distance: > 6 cm  Neck ROM: normal range of motion   Mouth opening: Normal     Cardiovascular  Regular rate and rhythm,  S1 and S2 normal,  no murmur, click, rub, or gallop             Dental    Dentition: Caps/crowns     Pulmonary  Breath sounds clear to auscultation               Abdominal  GI exam deferred       Other Findings            Anesthetic Plan    ASA: 3  Anesthesia type: total IV anesthesia          Induction: Intravenous  Anesthetic plan and risks discussed with: Patient

## 2022-07-14 ENCOUNTER — PREP FOR PROCEDURE (OUTPATIENT)
Dept: ADMINISTRATIVE | Age: 72
End: 2022-07-14

## 2022-07-14 RX ORDER — SODIUM CHLORIDE 0.9 % (FLUSH) 0.9 %
5-40 SYRINGE (ML) INJECTION PRN
OUTPATIENT
Start: 2022-07-14

## 2022-07-14 RX ORDER — SODIUM CHLORIDE 9 MG/ML
INJECTION, SOLUTION INTRAVENOUS PRN
OUTPATIENT
Start: 2022-07-14

## 2022-07-14 RX ORDER — SODIUM CHLORIDE 0.9 % (FLUSH) 0.9 %
5-40 SYRINGE (ML) INJECTION EVERY 12 HOURS SCHEDULED
OUTPATIENT
Start: 2022-07-14

## 2022-07-19 ENCOUNTER — OFFICE VISIT (OUTPATIENT)
Dept: FAMILY MEDICINE CLINIC | Facility: CLINIC | Age: 72
End: 2022-07-19
Payer: MEDICARE

## 2022-07-19 VITALS
BODY MASS INDEX: 21.26 KG/M2 | HEART RATE: 66 BPM | DIASTOLIC BLOOD PRESSURE: 74 MMHG | OXYGEN SATURATION: 99 % | SYSTOLIC BLOOD PRESSURE: 110 MMHG | HEIGHT: 72 IN | TEMPERATURE: 96.7 F | WEIGHT: 157 LBS

## 2022-07-19 DIAGNOSIS — E78.49 OTHER HYPERLIPIDEMIA: ICD-10-CM

## 2022-07-19 DIAGNOSIS — E55.9 VITAMIN D DEFICIENCY: Primary | ICD-10-CM

## 2022-07-19 DIAGNOSIS — D50.8 IRON DEFICIENCY ANEMIA SECONDARY TO INADEQUATE DIETARY IRON INTAKE: ICD-10-CM

## 2022-07-19 DIAGNOSIS — R73.03 PREDIABETES: ICD-10-CM

## 2022-07-19 LAB
25(OH)D3 SERPL-MCNC: 42.5 NG/ML (ref 30–100)
ALBUMIN SERPL-MCNC: 3.9 G/DL (ref 3.2–4.6)
ALBUMIN/GLOB SERPL: 1.2 {RATIO} (ref 1.2–3.5)
ALP SERPL-CCNC: 84 U/L (ref 50–136)
ALT SERPL-CCNC: 31 U/L (ref 12–65)
ANION GAP SERPL CALC-SCNC: 2 MMOL/L (ref 7–16)
AST SERPL-CCNC: 24 U/L (ref 15–37)
BILIRUB SERPL-MCNC: 0.4 MG/DL (ref 0.2–1.1)
BUN SERPL-MCNC: 22 MG/DL (ref 8–23)
CALCIUM SERPL-MCNC: 9.1 MG/DL (ref 8.3–10.4)
CHLORIDE SERPL-SCNC: 107 MMOL/L (ref 98–107)
CHOLEST SERPL-MCNC: 140 MG/DL
CO2 SERPL-SCNC: 31 MMOL/L (ref 21–32)
CREAT SERPL-MCNC: 1 MG/DL (ref 0.8–1.5)
GLOBULIN SER CALC-MCNC: 3.2 G/DL (ref 2.3–3.5)
GLUCOSE SERPL-MCNC: 80 MG/DL (ref 65–100)
HDLC SERPL-MCNC: 41 MG/DL (ref 40–60)
HDLC SERPL: 3.4 {RATIO}
LDLC SERPL CALC-MCNC: 79.4 MG/DL
POTASSIUM SERPL-SCNC: 4.4 MMOL/L (ref 3.5–5.1)
PROT SERPL-MCNC: 7.1 G/DL (ref 6.3–8.2)
SODIUM SERPL-SCNC: 140 MMOL/L (ref 138–145)
TRIGL SERPL-MCNC: 98 MG/DL (ref 35–150)
VLDLC SERPL CALC-MCNC: 19.6 MG/DL (ref 6–23)

## 2022-07-19 PROCEDURE — 99214 OFFICE O/P EST MOD 30 MIN: CPT | Performed by: FAMILY MEDICINE

## 2022-07-19 PROCEDURE — 3017F COLORECTAL CA SCREEN DOC REV: CPT | Performed by: FAMILY MEDICINE

## 2022-07-19 PROCEDURE — G8420 CALC BMI NORM PARAMETERS: HCPCS | Performed by: FAMILY MEDICINE

## 2022-07-19 PROCEDURE — 1123F ACP DISCUSS/DSCN MKR DOCD: CPT | Performed by: FAMILY MEDICINE

## 2022-07-19 PROCEDURE — 1036F TOBACCO NON-USER: CPT | Performed by: FAMILY MEDICINE

## 2022-07-19 PROCEDURE — G8427 DOCREV CUR MEDS BY ELIG CLIN: HCPCS | Performed by: FAMILY MEDICINE

## 2022-07-19 ASSESSMENT — PATIENT HEALTH QUESTIONNAIRE - PHQ9
SUM OF ALL RESPONSES TO PHQ QUESTIONS 1-9: 0
SUM OF ALL RESPONSES TO PHQ9 QUESTIONS 1 & 2: 0
1. LITTLE INTEREST OR PLEASURE IN DOING THINGS: 0
SUM OF ALL RESPONSES TO PHQ QUESTIONS 1-9: 0
2. FEELING DOWN, DEPRESSED OR HOPELESS: 0

## 2022-07-19 ASSESSMENT — ENCOUNTER SYMPTOMS
NAUSEA: 0
SORE THROAT: 0
COUGH: 0
ABDOMINAL PAIN: 0
ABDOMINAL DISTENTION: 0
PHOTOPHOBIA: 0
SHORTNESS OF BREATH: 0
EYE PAIN: 0
COLOR CHANGE: 0
BLOOD IN STOOL: 0

## 2022-07-19 NOTE — PROGRESS NOTES
Marlyn Law  1950 is a 67 y.o. male ,Established patient, here for evaluation of the following chief complaint(s):   Chief Complaint   Patient presents with    3 Month Follow-Up    Cholesterol Problem     Pt is fasting          1. Vitamin D deficiency  -     Vitamin D 25 Hydroxy; Future  2. Prediabetes  -     CBC with Auto Differential; Future  -     Comprehensive Metabolic Panel; Future  -     Hemoglobin A1C; Future  3. Other hyperlipidemia  -     Lipid Panel; Future  4. Iron deficiency anemia secondary to inadequate dietary iron intake  Assessment & Plan:   Monitored by specialist- no acute findings meriting change in the plan--FOLLOWED BY DR CHRISTOPHER Chan in about 3 months (around 10/19/2022). Subjective   SUBJECTIVE/OBJECTIVE:  Hyperlipidemia  This is a chronic problem. The problem is controlled. Recent lipid tests were reviewed and are normal. He has no history of chronic renal disease. PREDIABETES. Factors aggravating his hyperlipidemia include fatty foods. Pertinent negatives include no chest pain, myalgias or shortness of breath. The current treatment provides mild improvement of lipids. Review of Systems   Constitutional:  Negative for chills and fever. HENT:  Negative for ear pain, hearing loss and sore throat. Eyes:  Negative for photophobia and pain. Respiratory:  Negative for cough and shortness of breath. Cardiovascular:  Negative for chest pain, palpitations and leg swelling. Gastrointestinal:  Negative for abdominal distention, abdominal pain, blood in stool and nausea. Genitourinary:  Negative for dysuria and urgency. Musculoskeletal:  Negative for joint swelling and myalgias. Skin:  Negative for color change, pallor and rash. Neurological:  Negative for speech difficulty, weakness, light-headedness and headaches. Hematological:  Negative for adenopathy.    Psychiatric/Behavioral:  Negative for agitation, confusion, hallucinations, self-injury and suicidal ideas. Objective   Physical Exam  Constitutional:       Appearance: Normal appearance. HENT:      Head: Normocephalic and atraumatic. Nose: Nose normal.   Eyes:      Extraocular Movements: Extraocular movements intact. Conjunctiva/sclera: Conjunctivae normal.      Pupils: Pupils are equal, round, and reactive to light. Cardiovascular:      Rate and Rhythm: Normal rate and regular rhythm. Pulses: Normal pulses. Heart sounds: Normal heart sounds. Pulmonary:      Effort: Pulmonary effort is normal.      Breath sounds: Normal breath sounds. Abdominal:      General: Abdomen is flat. Bowel sounds are normal.      Palpations: Abdomen is soft. Skin:     General: Skin is warm and dry. Neurological:      General: No focal deficit present. Mental Status: He is alert and oriented to person, place, and time. Psychiatric:         Mood and Affect: Mood normal.                 An electronic signature was used to authenticate this note.     --Parisa Blount MD

## 2022-07-20 LAB
BASOPHILS # BLD: 0.1 K/UL (ref 0–0.2)
BASOPHILS NFR BLD: 2 % (ref 0–2)
DIFFERENTIAL METHOD BLD: ABNORMAL
EOSINOPHIL # BLD: 0.1 K/UL (ref 0–0.8)
EOSINOPHIL NFR BLD: 3 % (ref 0.5–7.8)
ERYTHROCYTE [DISTWIDTH] IN BLOOD BY AUTOMATED COUNT: 13.5 % (ref 11.9–14.6)
EST. AVERAGE GLUCOSE BLD GHB EST-MCNC: 117 MG/DL
HBA1C MFR BLD: 5.7 % (ref 4.8–5.6)
HCT VFR BLD AUTO: 44.3 % (ref 41.1–50.3)
HGB BLD-MCNC: 13.6 G/DL (ref 13.6–17.2)
IMM GRANULOCYTES # BLD AUTO: 0 K/UL (ref 0–0.5)
IMM GRANULOCYTES NFR BLD AUTO: 0 % (ref 0–5)
LYMPHOCYTES # BLD: 1.1 K/UL (ref 0.5–4.6)
LYMPHOCYTES NFR BLD: 23 % (ref 13–44)
MCH RBC QN AUTO: 31.9 PG (ref 26.1–32.9)
MCHC RBC AUTO-ENTMCNC: 30.7 G/DL (ref 31.4–35)
MCV RBC AUTO: 103.7 FL (ref 79.6–97.8)
MONOCYTES # BLD: 0.5 K/UL (ref 0.1–1.3)
MONOCYTES NFR BLD: 11 % (ref 4–12)
NEUTS SEG # BLD: 2.9 K/UL (ref 1.7–8.2)
NEUTS SEG NFR BLD: 61 % (ref 43–78)
NRBC # BLD: 0 K/UL (ref 0–0.2)
PLATELET # BLD AUTO: 209 K/UL (ref 150–450)
PMV BLD AUTO: 12.8 FL (ref 9.4–12.3)
RBC # BLD AUTO: 4.27 M/UL (ref 4.23–5.6)
WBC # BLD AUTO: 4.7 K/UL (ref 4.3–11.1)

## 2022-07-21 RX ORDER — LEVOTHYROXINE SODIUM 0.05 MG/1
TABLET ORAL
Qty: 90 TABLET | Refills: 2 | Status: SHIPPED | OUTPATIENT
Start: 2022-07-21

## 2022-07-21 RX ORDER — DULOXETIN HYDROCHLORIDE 60 MG/1
CAPSULE, DELAYED RELEASE ORAL
Qty: 90 CAPSULE | Refills: 2 | Status: SHIPPED | OUTPATIENT
Start: 2022-07-21

## 2022-08-23 NOTE — PERIOP NOTE
Patient verified name and ,  Order for consent NOT found in EHR at time of PAT visit. Unable to verify case posting against order; surgery verified by patient. Type: 1a; abbreviated assessment per anesthesia guidelines  Labs per surgeon: none ordered  Labs per anesthesia: not indicated      Instructed pt that they will be notified by the Gi Lab for time of arrival. If any questions please call the GI lab at [phone]. Follow diet and prep instructions per office. May have clear liquids until 4 hours prior to time of arrival.    Greene Roxo or shower the night before and the am of surgery with antibacterial soap. No lotions, oils, powders, cologne on skin. No make up, eye make up or jewelry. Wear loose fitting comfortable, clean clothing. Must have adult present in building the entire time . Medications for the day of procedure:  aspirin 81 mg as instructed by surgeon, duloxetine, levothyroxine, omeprazole. Hold vitamins, supplements, NSAIDS, celebrex now for surgey    The following discharge instructions reviewed with patient: medication given during procedure may cause drowsiness for several hours, therefore, do not drive or operate machinery for remainder of the day, no alcohol on the day of your procedure, resume regular diet and activity unless otherwise directed, for mild sore throat you may use Cepacol throat lozenges or warm salt water gargles as needed, call your physician for any problems or questions. Patient verbalizes understanding.

## 2022-08-25 ENCOUNTER — ANESTHESIA EVENT (OUTPATIENT)
Dept: ENDOSCOPY | Age: 72
End: 2022-08-25
Payer: MEDICARE

## 2022-08-25 RX ORDER — SODIUM CHLORIDE 0.9 % (FLUSH) 0.9 %
5-40 SYRINGE (ML) INJECTION PRN
Status: CANCELLED | OUTPATIENT
Start: 2022-08-25

## 2022-08-25 RX ORDER — LABETALOL HYDROCHLORIDE 5 MG/ML
10 INJECTION, SOLUTION INTRAVENOUS
Status: CANCELLED | OUTPATIENT
Start: 2022-08-25

## 2022-08-25 RX ORDER — HALOPERIDOL 5 MG/ML
1 INJECTION INTRAMUSCULAR
Status: CANCELLED | OUTPATIENT
Start: 2022-08-25 | End: 2022-08-25

## 2022-08-25 RX ORDER — SODIUM CHLORIDE 9 MG/ML
INJECTION, SOLUTION INTRAVENOUS PRN
Status: CANCELLED | OUTPATIENT
Start: 2022-08-25

## 2022-08-25 RX ORDER — HYDRALAZINE HYDROCHLORIDE 20 MG/ML
10 INJECTION INTRAMUSCULAR; INTRAVENOUS
Status: CANCELLED | OUTPATIENT
Start: 2022-08-25

## 2022-08-25 RX ORDER — PROCHLORPERAZINE EDISYLATE 5 MG/ML
5 INJECTION INTRAMUSCULAR; INTRAVENOUS
Status: CANCELLED | OUTPATIENT
Start: 2022-08-25 | End: 2022-08-25

## 2022-08-25 RX ORDER — DIPHENHYDRAMINE HYDROCHLORIDE 50 MG/ML
12.5 INJECTION INTRAMUSCULAR; INTRAVENOUS
Status: CANCELLED | OUTPATIENT
Start: 2022-08-25 | End: 2022-08-25

## 2022-08-25 RX ORDER — SODIUM CHLORIDE 0.9 % (FLUSH) 0.9 %
5-40 SYRINGE (ML) INJECTION EVERY 12 HOURS SCHEDULED
Status: CANCELLED | OUTPATIENT
Start: 2022-08-25

## 2022-08-25 RX ORDER — SODIUM CHLORIDE, SODIUM LACTATE, POTASSIUM CHLORIDE, CALCIUM CHLORIDE 600; 310; 30; 20 MG/100ML; MG/100ML; MG/100ML; MG/100ML
INJECTION, SOLUTION INTRAVENOUS CONTINUOUS
Status: CANCELLED | OUTPATIENT
Start: 2022-08-25

## 2022-08-25 RX ORDER — LIDOCAINE HYDROCHLORIDE 10 MG/ML
1 INJECTION, SOLUTION INFILTRATION; PERINEURAL
Status: CANCELLED | OUTPATIENT
Start: 2022-08-25 | End: 2022-08-25

## 2022-08-25 NOTE — PROGRESS NOTES
RN called and spoke with patient regarding upcoming procedure on 8/26/2022. RN verified with patient procedure, procedure time, arrival time, location, and  verification. Patient verified and verbalized understanding of prep instructions. Opportunity for questions provided.

## 2022-08-26 ENCOUNTER — ANESTHESIA (OUTPATIENT)
Dept: ENDOSCOPY | Age: 72
End: 2022-08-26
Payer: MEDICARE

## 2022-08-26 ENCOUNTER — HOSPITAL ENCOUNTER (OUTPATIENT)
Age: 72
Setting detail: OUTPATIENT SURGERY
Discharge: HOME | End: 2022-08-26
Attending: INTERNAL MEDICINE | Admitting: INTERNAL MEDICINE
Payer: MEDICARE

## 2022-08-26 VITALS
BODY MASS INDEX: 21.94 KG/M2 | HEART RATE: 70 BPM | RESPIRATION RATE: 14 BRPM | TEMPERATURE: 98.4 F | DIASTOLIC BLOOD PRESSURE: 77 MMHG | WEIGHT: 162 LBS | SYSTOLIC BLOOD PRESSURE: 144 MMHG | OXYGEN SATURATION: 95 % | HEIGHT: 72 IN

## 2022-08-26 PROCEDURE — 6360000002 HC RX W HCPCS: Performed by: NURSE ANESTHETIST, CERTIFIED REGISTERED

## 2022-08-26 PROCEDURE — 3609017700 HC EGD DILATION GASTRIC/DUODENAL STRICTURE: Performed by: INTERNAL MEDICINE

## 2022-08-26 PROCEDURE — 2580000003 HC RX 258: Performed by: NURSE ANESTHETIST, CERTIFIED REGISTERED

## 2022-08-26 PROCEDURE — 7100000010 HC PHASE II RECOVERY - FIRST 15 MIN: Performed by: INTERNAL MEDICINE

## 2022-08-26 PROCEDURE — 2709999900 HC NON-CHARGEABLE SUPPLY: Performed by: INTERNAL MEDICINE

## 2022-08-26 PROCEDURE — 3700000000 HC ANESTHESIA ATTENDED CARE: Performed by: INTERNAL MEDICINE

## 2022-08-26 PROCEDURE — 7100000011 HC PHASE II RECOVERY - ADDTL 15 MIN: Performed by: INTERNAL MEDICINE

## 2022-08-26 RX ORDER — SODIUM CHLORIDE, SODIUM LACTATE, POTASSIUM CHLORIDE, CALCIUM CHLORIDE 600; 310; 30; 20 MG/100ML; MG/100ML; MG/100ML; MG/100ML
INJECTION, SOLUTION INTRAVENOUS CONTINUOUS PRN
Status: DISCONTINUED | OUTPATIENT
Start: 2022-08-26 | End: 2022-08-26 | Stop reason: SDUPTHER

## 2022-08-26 RX ORDER — PROPOFOL 10 MG/ML
INJECTION, EMULSION INTRAVENOUS PRN
Status: DISCONTINUED | OUTPATIENT
Start: 2022-08-26 | End: 2022-08-26 | Stop reason: SDUPTHER

## 2022-08-26 RX ADMIN — PROPOFOL 30 MG: 10 INJECTION, EMULSION INTRAVENOUS at 12:43

## 2022-08-26 RX ADMIN — PROPOFOL 50 MG: 10 INJECTION, EMULSION INTRAVENOUS at 12:37

## 2022-08-26 RX ADMIN — SODIUM CHLORIDE, SODIUM LACTATE, POTASSIUM CHLORIDE, AND CALCIUM CHLORIDE: 600; 310; 30; 20 INJECTION, SOLUTION INTRAVENOUS at 12:33

## 2022-08-26 RX ADMIN — PROPOFOL 40 MG: 10 INJECTION, EMULSION INTRAVENOUS at 12:40

## 2022-08-26 ASSESSMENT — LIFESTYLE VARIABLES: SMOKING_STATUS: 0

## 2022-08-26 ASSESSMENT — PAIN - FUNCTIONAL ASSESSMENT
PAIN_FUNCTIONAL_ASSESSMENT: NONE - DENIES PAIN
PAIN_FUNCTIONAL_ASSESSMENT: 0-10
PAIN_FUNCTIONAL_ASSESSMENT: NONE - DENIES PAIN

## 2022-08-26 NOTE — OP NOTE
Esophagogastroduodenoscopy    DATE of PROCEDURE: 8/26/2022    INDICATIONS:  1. Dysphagia    MEDICATIONS ADMINISTERED: MAC    INSTRUMENT: GIF    PROCEDURE:  After obtaining informed consent, the patient was placed in the left lateral position and sedated. The endoscope was advanced under direct vision without difficulty. The esophagus, stomach (including retroflexed views) and duodenum were evaluated. The patient was taken to the recovery area in stable condition. FINDINGS:  ESOPHAGUS: Normal appearance. No resistance or trauma after 48-[place] dilation. Moderate trauma after UES consistent with a stenosis after 54-[address] dilation. STOMACH: Normal   DUODENUM: Normal     Estimated blood loss: 0-minimal     PLAN:  1.  Repeat dilation per Dr Marla Yang MD  [facility], [place]

## 2022-08-26 NOTE — ANESTHESIA PRE PROCEDURE
Department of Anesthesiology  Preprocedure Note       Name:  Karina Cano   Age:  67 y.o.  :  1950                                          MRN:  [id number]         Date:  2022      Surgeon: Camila Smith):  Carla Benz MD    Procedure: Procedure(s):  EGD ESOPHAGOGASTRODUODENOSCOPY     Medications prior to admission:   Prior to Admission medications    Medication Sig Start Date End Date Taking? Authorizing Provider   levothyroxine (SYNTHROID) 50 MCG tablet TAKE 1 TABLET EVERY DAY 22   Anika Pitts, APRN - CNP   DULoxetine (CYMBALTA) 60 MG extended release capsule TAKE 1 CAPSULE EVERY DAY 22   Anika Pitts, APRN - CNP   Multiple Vitamin (MULTI-VITAMIN DAILY PO) Take 0.5 tablets by mouth daily    Historical Provider, MD   aspirin 81 MG EC tablet Take 81 mg by mouth    Ar Automatic Reconciliation   atorvastatin (LIPITOR) 10 MG tablet TAKE 1 TABLET NIGHTLY FOR HYPERCHOLESTEROLEMIA 10/6/21   Ar Automatic Reconciliation   celecoxib (CELEBREX) 200 MG capsule Take 200 mg by mouth daily as needed 21   Ar Automatic Reconciliation   cyclobenzaprine (FLEXERIL) 5 MG tablet Take 5 mg by mouth 2 times daily as needed 18   Ar Automatic Reconciliation   ferrous sulfate (IRON 325) 325 (65 Fe) MG tablet Take by mouth every evening    Ar Automatic Reconciliation   gabapentin (NEURONTIN) 300 MG capsule Take one capsule daily before dinner 21   Ar Automatic Reconciliation   Lutein 6 MG TABS Take 20 mg by mouth daily    Ar Automatic Reconciliation   omeprazole (PRILOSEC) 40 MG delayed release capsule TAKE 1 CAPSULE EVERY MORNING 22   Ar Automatic Reconciliation   SODIUM FLUORIDE, DENTAL GEL, 1.1 % GEL USE AS DIRECTED 10/8/20   Ar Automatic Reconciliation       Current medications:    No current facility-administered medications for this encounter. Allergies:     Allergies   Allergen Reactions    Hydrocodone Itching    Tramadol Other (See Comments)     Dizziness, passed out Problem List:    Patient Active Problem List   Diagnosis Code    Anemia, unspecified D64.9    Cobalamin deficiency E53.8    Malignant neoplasm of base of tongue ([facility] [address] C01    Lumbago M54.50    Mixed hyperlipidemia E78.2    Reflux esophagitis K21.00    Iron deficiency anemia due to chronic blood loss D50.0    Postablative hypothyroidism E89.0    Syncope R55    Age-related osteoporosis without current pathological fracture M81.0       Past Medical History:        Diagnosis Date    Arthritis     osteoporosis in joints    COVID 11/13/2021    no hospitalization    DJD (degenerative joint disease)     back    Dysphagia, unspecified(787.20)     followed by dr Paige Parker Esophageal stricture     has esophagus stretched every 3-4 months    GERD (gastroesophageal reflux disease)     OMEPRAZOLE DAILY- WELL CONTROLLED     History of squamous cell carcinoma 2008    at base of tongue/throat  surg, chemo and radiation    Hypercholesteremia     Hypothyroid     manaaged with medication    Osteoporosis     Peripheral neuropathy     chemo induced    Personal history of colonic polyps     PUD (peptic ulcer disease)     2015    Throat cancer ([facility] [address] Dx 2009    throat-radiation, chemo    Vitamin D deficiency        Past Surgical History:        Procedure Laterality Date    CATARACT REMOVAL Bilateral     COLONOSCOPY N/A 12/2/2019    COLONOSCOPY/BMI 22 performed by Rebeca Acosta MD at [address] [place] [address]      right hand-ganglion    HEENT      cleaned out lymph nodes throat after chemo    LUMBAR LAMINECTOMY  2002    LYMPH NODE DISSECTION Left 2009    neck    GA ABDOMEN SURGERY PROC UNLISTED  2009    PEG PLACED  then removed    TONSILLECTOMY  1972    UPPER GASTROINTESTINAL ENDOSCOPY      EGD EVERY 4 MONTHS/ DILATIONS    VASCULAR SURGERY  port/ 2008    placed and removal       Social History:    Social History     Tobacco Use    Smoking status: Former     Packs/day: 0.50 No results for input(s): POCGLU, POCNA, POCK, POCCL, POCBUN, POCHEMO, POCHCT in the last 72 hours. Coags: No results found for: PROTIME, INR, APTT    HCG (If Applicable): No results found for: PREGTESTUR, PREGSERUM, HCG, HCGQUANT     ABGs: No results found for: PHART, PO2ART, IVK3JGL, AXI0BSE, BEART, A3UPKMGN     Type & Screen (If Applicable):  No results found for: LABABO, LABRH    Drug/Infectious Status (If Applicable):  No results found for: HIV, HEPCAB    COVID-19 Screening (If Applicable):   Lab Results   Component Value Date/Time    COVID19 Performed 12/23/2021 11:20 AM    COVID19 Not Detected 12/23/2021 11:20 AM           Anesthesia Evaluation  Patient summary reviewed and Nursing notes reviewed  Airway: Mallampati: I  TM distance: >3 FB   Neck ROM: full  Mouth opening: > = 3 FB   Dental:    (+) partials      Pulmonary:Negative Pulmonary ROS breath sounds clear to auscultation      (-) not a current smoker                           Cardiovascular:  Exercise tolerance: good (>4 METS),   (+) hyperlipidemia        Rhythm: regular  Rate: normal                    Neuro/Psych:   Negative Neuro/Psych ROS              GI/Hepatic/Renal:   (+) GERD:,           Endo/Other:    (+) hypothyroidism::., malignancy/cancer (base of tongue s/p XRT). Abdominal:             Vascular: negative vascular ROS. Other Findings:           Anesthesia Plan      TIVA     ASA 3       Induction: intravenous. Anesthetic plan and risks discussed with patient and spouse.                         Qasim Sewell MD   8/26/2022

## 2022-08-26 NOTE — H&P
[facility] H&P (Admission)        Date:  8/26/2022    Chief Complaint:  dysphagia    Subjective:     History of Present Illness:  Patient is a 67 y.o. being admitted for EGD with dilation. PMH:  Past Medical History:   Diagnosis Date    Arthritis     osteoporosis in joints    COVID 11/13/2021    no hospitalization    DJD (degenerative joint disease)     back    Dysphagia, unspecified(787.20)     followed by dr Jackson Samples    Esophageal stricture     has esophagus stretched every 3-4 months    GERD (gastroesophageal reflux disease)     OMEPRAZOLE DAILY- WELL CONTROLLED     History of squamous cell carcinoma 2008    at base of tongue/throat  surg, chemo and radiation    Hypercholesteremia     Hypothyroid     manaaged with medication    Osteoporosis     Peripheral neuropathy     chemo induced    Personal history of colonic polyps     PUD (peptic ulcer disease)     2015    Throat cancer ([address] Dx 2009    throat-radiation, chemo    Vitamin D deficiency        PSH:  Past Surgical History:   Procedure Laterality Date    CATARACT REMOVAL Bilateral     COLONOSCOPY N/A 12/2/2019    COLONOSCOPY/BMI 22 performed by Donna Villalba MD at [address] [place] [address]      right hand-ganglion    HEENT      cleaned out lymph nodes throat after chemo    LUMBAR LAMINECTOMY  2002    LYMPH NODE DISSECTION Left 2009    neck    LA ABDOMEN SURGERY PROC UNLISTED  2009    PEG PLACED  then removed    TONSILLECTOMY  1972    UPPER GASTROINTESTINAL ENDOSCOPY      EGD EVERY 4 MONTHS/ DILATIONS    VASCULAR SURGERY  port/ 2008    placed and removal       Allergies: Allergies   Allergen Reactions    Hydrocodone Itching    Tramadol Other (See Comments)     Dizziness, passed out        Home Medications:  Prior to Admission medications    Medication Sig Start Date End Date Taking?  Authorizing Provider   levothyroxine (SYNTHROID) 50 MCG tablet TAKE 1 TABLET EVERY DAY 7/21/22   Paul Covert, APRN - CNP   DULoxetine (CYMBALTA) 60 MG extended release capsule TAKE 1 CAPSULE EVERY DAY 22   Javy Mcqueen, APRN - CNP   Multiple Vitamin (MULTI-VITAMIN DAILY PO) Take 0.5 tablets by mouth daily    Historical Provider, MD   aspirin 81 MG EC tablet Take 81 mg by mouth    Ar Automatic Reconciliation   atorvastatin (LIPITOR) 10 MG tablet TAKE 1 TABLET NIGHTLY FOR HYPERCHOLESTEROLEMIA 10/6/21   Ar Automatic Reconciliation   celecoxib (CELEBREX) 200 MG capsule Take 200 mg by mouth daily as needed 21   Ar Automatic Reconciliation   cyclobenzaprine (FLEXERIL) 5 MG tablet Take 5 mg by mouth 2 times daily as needed 18   Ar Automatic Reconciliation   ferrous sulfate (IRON 325) 325 (65 Fe) MG tablet Take by mouth every evening    Ar Automatic Reconciliation   gabapentin (NEURONTIN) 300 MG capsule Take one capsule daily before dinner 21   Ar Automatic Reconciliation   Lutein 6 MG TABS Take 20 mg by mouth daily    Ar Automatic Reconciliation   omeprazole (PRILOSEC) 40 MG delayed release capsule TAKE 1 CAPSULE EVERY MORNING 22   Ar Automatic Reconciliation   SODIUM FLUORIDE, DENTAL GEL, 1.1 % GEL USE AS DIRECTED 10/8/20   Ar Automatic Reconciliation       Hospital Medications:  No current facility-administered medications for this encounter.        Social History:  Social History     Tobacco Use    Smoking status: Former     Packs/day: 0.50     Years: 13.00     Pack years: 6.50     Types: Cigarettes     Quit date: 1977     Years since quittin.6    Smokeless tobacco: Never   Substance Use Topics    Alcohol use: No         Family History:  Family History   Problem Relation Age of Onset    No Known Problems Sister     No Known Problems Sister     No Known Problems Brother     Pancreatic Cancer Mother     Breast Cancer Mother     Cancer Mother         pancreatic    Diabetes Brother     Other Brother         AGENT ORANGE    No Known Problems Sister     No Known Problems Sister     Lung Disease Brother        Review of Systems:  A detailed 10 system ROS is obtained, with pertinent positives as listed above. All others are negative. Objective:     Physical Exam:  Vitals:  /72   Pulse 74   Temp 97.8 °F (36.6 °C) (Oral)   Resp 12   Ht 6' (1.829 m)   Wt 162 lb (73.5 kg)   SpO2 100%   BMI 21.97 kg/m²   Gen:  Pt is alert, cooperative, no acute distress  Skin: no large lesions  HEENT: MMM  Cardiovascular: Regular rate and rhythm. No murmurs, gallops, or rubs. Respiratory:  Comfortable breathing with no accessory muscle use. Clear breath sounds with no wheezes, rales, or rhonchi. GI:  Abdomen nondistended, soft, and nontender. Normal active bowel sounds. Neurological:  Gross memory appears intact. Patient is alert and oriented. Psychiatric:  Mood appears appropriate with judgement intact. Laboratory:    No results for input(s): WBC, HGB, HCT, PLT, MCV, NA, K, CL, CO2, BUN, CREA, CA, MG, GLU, ALB, TP, AML, INR, APTT in the last 72 hours. Invalid input(s): AP, SGOT, GPT, TBIL, DBIL, CBIL, LPSE, PTP    Assessment:       Active Problems:    * No active hospital problems. *  Resolved Problems:    * No resolved hospital problems. *      Plan:     Endoscopy and risks explained to the patient. Risks including reaction to sedation, cardiopulmonary events, infection, bleeding, perforation, requirement for surgery if complications should occur, death were explained to patient who expressed understanding and agreed to proceed with endoscopy.         Maximilian Pichardo MD  [facility] [place]

## 2022-08-26 NOTE — DISCHARGE INSTRUCTIONS
Gastrointestinal Esophagogastroduodenoscopy (EGD)/ Endoscopic Ultrasound(EUS)- Upper Exam Discharge Instructions    1. Call Dr. Digna Gonzalez at [phone]  for any problems or questions. 2. Contact the doctor's office for follow up appointment as directed. 3. Medication may cause drowsiness for several hours, therefore, do not drive or operate machinery for remainder of the day. 4. No alcohol today. 5. Do not make any important decisions such as signing legal paperwork. 6. Ordinarily, you may resume regular diet and activity after exam unless otherwise specified by your physician. 7. For mild soreness in your throat you may use Cepacol throat lozenges or warm  salt-water gargles as needed. Any additional instructions: Follow up with Dr Digna Gonzalez as needed        Instructions given to Kadie Warren and other family members.

## 2022-10-02 RX ORDER — OMEPRAZOLE 40 MG/1
CAPSULE, DELAYED RELEASE ORAL
Qty: 90 CAPSULE | Refills: 1 | Status: SHIPPED | OUTPATIENT
Start: 2022-10-02

## 2022-10-20 ENCOUNTER — OFFICE VISIT (OUTPATIENT)
Dept: FAMILY MEDICINE CLINIC | Facility: CLINIC | Age: 72
End: 2022-10-20
Payer: MEDICARE

## 2022-10-20 VITALS
OXYGEN SATURATION: 97 % | SYSTOLIC BLOOD PRESSURE: 122 MMHG | WEIGHT: 159 LBS | HEART RATE: 79 BPM | DIASTOLIC BLOOD PRESSURE: 82 MMHG | BODY MASS INDEX: 21.54 KG/M2 | HEIGHT: 72 IN | TEMPERATURE: 97.6 F

## 2022-10-20 DIAGNOSIS — E03.8 OTHER SPECIFIED HYPOTHYROIDISM: ICD-10-CM

## 2022-10-20 DIAGNOSIS — K22.2 BENIGN ESOPHAGEAL STRICTURE: ICD-10-CM

## 2022-10-20 DIAGNOSIS — E55.9 VITAMIN D DEFICIENCY: ICD-10-CM

## 2022-10-20 DIAGNOSIS — R73.09 ABNORMAL GLUCOSE: Primary | ICD-10-CM

## 2022-10-20 DIAGNOSIS — E78.2 MIXED HYPERLIPIDEMIA: ICD-10-CM

## 2022-10-20 LAB
25(OH)D3 SERPL-MCNC: 46.1 NG/ML (ref 30–100)
ALBUMIN SERPL-MCNC: 3.9 G/DL (ref 3.2–4.6)
ALBUMIN/GLOB SERPL: 1.3 {RATIO} (ref 0.4–1.6)
ALP SERPL-CCNC: 74 U/L (ref 50–136)
ALT SERPL-CCNC: 30 U/L (ref 12–65)
ANION GAP SERPL CALC-SCNC: 4 MMOL/L (ref 2–11)
AST SERPL-CCNC: 21 U/L (ref 15–37)
BASOPHILS # BLD: 0.1 K/UL (ref 0–0.2)
BASOPHILS NFR BLD: 2 % (ref 0–2)
BILIRUB SERPL-MCNC: 0.4 MG/DL (ref 0.2–1.1)
BUN SERPL-MCNC: 23 MG/DL (ref 8–23)
CALCIUM SERPL-MCNC: 8.7 MG/DL (ref 8.3–10.4)
CHLORIDE SERPL-SCNC: 108 MMOL/L (ref 101–110)
CHOLEST SERPL-MCNC: 137 MG/DL
CO2 SERPL-SCNC: 28 MMOL/L (ref 21–32)
CREAT SERPL-MCNC: 1 MG/DL (ref 0.8–1.5)
DIFFERENTIAL METHOD BLD: ABNORMAL
EOSINOPHIL # BLD: 0.2 K/UL (ref 0–0.8)
EOSINOPHIL NFR BLD: 4 % (ref 0.5–7.8)
ERYTHROCYTE [DISTWIDTH] IN BLOOD BY AUTOMATED COUNT: 13.8 % (ref 11.9–14.6)
GLOBULIN SER CALC-MCNC: 2.9 G/DL (ref 2.8–4.5)
GLUCOSE SERPL-MCNC: 87 MG/DL (ref 65–100)
HCT VFR BLD AUTO: 43.6 % (ref 41.1–50.3)
HDLC SERPL-MCNC: 43 MG/DL (ref 40–60)
HDLC SERPL: 3.2 {RATIO}
HGB BLD-MCNC: 13.6 G/DL (ref 13.6–17.2)
IMM GRANULOCYTES # BLD AUTO: 0 K/UL (ref 0–0.5)
IMM GRANULOCYTES NFR BLD AUTO: 0 % (ref 0–5)
LDLC SERPL CALC-MCNC: 78.6 MG/DL
LYMPHOCYTES # BLD: 1.2 K/UL (ref 0.5–4.6)
LYMPHOCYTES NFR BLD: 25 % (ref 13–44)
MCH RBC QN AUTO: 32.2 PG (ref 26.1–32.9)
MCHC RBC AUTO-ENTMCNC: 31.2 G/DL (ref 31.4–35)
MCV RBC AUTO: 103.1 FL (ref 82–102)
MONOCYTES # BLD: 0.6 K/UL (ref 0.1–1.3)
MONOCYTES NFR BLD: 12 % (ref 4–12)
NEUTS SEG # BLD: 2.7 K/UL (ref 1.7–8.2)
NEUTS SEG NFR BLD: 57 % (ref 43–78)
NRBC # BLD: 0 K/UL (ref 0–0.2)
PLATELET # BLD AUTO: 196 K/UL (ref 150–450)
PMV BLD AUTO: 12.1 FL (ref 9.4–12.3)
POTASSIUM SERPL-SCNC: 3.9 MMOL/L (ref 3.5–5.1)
PROT SERPL-MCNC: 6.8 G/DL (ref 6.3–8.2)
RBC # BLD AUTO: 4.23 M/UL (ref 4.23–5.6)
SODIUM SERPL-SCNC: 140 MMOL/L (ref 133–143)
TRIGL SERPL-MCNC: 77 MG/DL (ref 35–150)
TSH, 3RD GENERATION: 5.12 UIU/ML (ref 0.36–3.74)
VLDLC SERPL CALC-MCNC: 15.4 MG/DL (ref 6–23)
WBC # BLD AUTO: 4.7 K/UL (ref 4.3–11.1)

## 2022-10-20 PROCEDURE — G8484 FLU IMMUNIZE NO ADMIN: HCPCS | Performed by: FAMILY MEDICINE

## 2022-10-20 PROCEDURE — 1036F TOBACCO NON-USER: CPT | Performed by: FAMILY MEDICINE

## 2022-10-20 PROCEDURE — 99214 OFFICE O/P EST MOD 30 MIN: CPT | Performed by: FAMILY MEDICINE

## 2022-10-20 PROCEDURE — G8427 DOCREV CUR MEDS BY ELIG CLIN: HCPCS | Performed by: FAMILY MEDICINE

## 2022-10-20 PROCEDURE — 1123F ACP DISCUSS/DSCN MKR DOCD: CPT | Performed by: FAMILY MEDICINE

## 2022-10-20 PROCEDURE — G8420 CALC BMI NORM PARAMETERS: HCPCS | Performed by: FAMILY MEDICINE

## 2022-10-20 PROCEDURE — 3017F COLORECTAL CA SCREEN DOC REV: CPT | Performed by: FAMILY MEDICINE

## 2022-10-20 ASSESSMENT — PATIENT HEALTH QUESTIONNAIRE - PHQ9
SUM OF ALL RESPONSES TO PHQ9 QUESTIONS 1 & 2: 0
SUM OF ALL RESPONSES TO PHQ QUESTIONS 1-9: 0
SUM OF ALL RESPONSES TO PHQ QUESTIONS 1-9: 0
1. LITTLE INTEREST OR PLEASURE IN DOING THINGS: 0
SUM OF ALL RESPONSES TO PHQ QUESTIONS 1-9: 0
SUM OF ALL RESPONSES TO PHQ QUESTIONS 1-9: 0
2. FEELING DOWN, DEPRESSED OR HOPELESS: 0

## 2022-10-21 LAB
EST. AVERAGE GLUCOSE BLD GHB EST-MCNC: 160 MG/DL
HBA1C MFR BLD: 7.2 % (ref 4.8–5.6)

## 2022-10-21 ASSESSMENT — ENCOUNTER SYMPTOMS
HOARSE VOICE: 0
SHORTNESS OF BREATH: 0
BLOOD IN STOOL: 0
COLOR CHANGE: 0
NAUSEA: 0
SORE THROAT: 0
COUGH: 0
EYE PAIN: 0
CONSTIPATION: 0
ABDOMINAL DISTENTION: 0
PHOTOPHOBIA: 0
ABDOMINAL PAIN: 0

## 2022-10-21 NOTE — PROGRESS NOTES
Chrystine Goldmann  1950 is a 67 y.o. male ,Established patient, here for evaluation of the following chief complaint(s):   Chief Complaint   Patient presents with    Follow-up     3 month. He is fasting          1. Abnormal glucose  -     Comprehensive Metabolic Panel; Future  -     Hemoglobin A1C; Future  2. Benign esophageal stricture  -     CBC with Auto Differential; Future  -     Comprehensive Metabolic Panel; Future  3. Mixed hyperlipidemia  -     Lipid Panel; Future  4. Vitamin D deficiency  -     Vitamin D 25 Hydroxy; Future  5. Other specified hypothyroidism  -     TSH; Future  -     Hemoglobin A1C; Future        Return in about 3 months (around 1/20/2023). Subjective   SUBJECTIVE/OBJECTIVE:  He recently had EGD done with esophageal dilation. Hyperlipidemia  This is a chronic problem. The current episode started more than 1 year ago. The problem is controlled. Recent lipid tests were reviewed and are normal. Exacerbating diseases include hypothyroidism. He has no history of chronic renal disease or diabetes. Pertinent negatives include no chest pain, myalgias or shortness of breath. Thyroid Problem  Presents for follow-up visit. Patient reports no anxiety, cold intolerance, constipation, depressed mood, diaphoresis, hoarse voice, leg swelling or palpitations. His past medical history is significant for hyperlipidemia. There is no history of diabetes. Review of Systems   Constitutional:  Negative for chills, diaphoresis and fever. HENT:  Negative for ear pain, hearing loss, hoarse voice and sore throat. Eyes:  Negative for photophobia and pain. Respiratory:  Negative for cough and shortness of breath. Cardiovascular:  Negative for chest pain, palpitations and leg swelling. Gastrointestinal:  Negative for abdominal distention, abdominal pain, blood in stool, constipation and nausea. Endocrine: Negative for cold intolerance.    Genitourinary:  Negative for dysuria and urgency. Musculoskeletal:  Negative for joint swelling and myalgias. Skin:  Negative for color change, pallor and rash. Neurological:  Negative for speech difficulty, weakness, light-headedness and headaches. Hematological:  Negative for adenopathy. Psychiatric/Behavioral:  Negative for agitation, confusion, hallucinations, self-injury and suicidal ideas. The patient is not nervous/anxious. Objective   Physical Exam  Constitutional:       Appearance: Normal appearance. HENT:      Head: Normocephalic and atraumatic. Nose: Nose normal.   Eyes:      Extraocular Movements: Extraocular movements intact. Conjunctiva/sclera: Conjunctivae normal.      Pupils: Pupils are equal, round, and reactive to light. Cardiovascular:      Rate and Rhythm: Normal rate and regular rhythm. Pulses: Normal pulses. Heart sounds: Normal heart sounds. Pulmonary:      Effort: Pulmonary effort is normal.      Breath sounds: Normal breath sounds. Abdominal:      General: Abdomen is flat. Bowel sounds are normal.      Palpations: Abdomen is soft. Skin:     General: Skin is warm and dry. Neurological:      General: No focal deficit present. Mental Status: He is alert and oriented to person, place, and time. Psychiatric:         Mood and Affect: Mood normal.                 An electronic signature was used to authenticate this note.     --Antonio Mccracken MD

## 2022-10-28 ENCOUNTER — TELEPHONE (OUTPATIENT)
Dept: FAMILY MEDICINE CLINIC | Facility: CLINIC | Age: 72
End: 2022-10-28

## 2022-10-28 NOTE — TELEPHONE ENCOUNTER
----- Message from [place], [place] sent at 10/25/2022  2:34 PM EDT -----  Regarding: appointment  Can you call pt and set him up with a Nurse visit in 2 weeks he has not had much luck when calling into the office  ----- Message -----  From: Tigre Weir MD  Sent: 10/21/2022  11:11 AM EDT  To: [facility], [place]    Note that AIC IS 7.2----THIS INDICATES DIABETES----HE CAN COME IN FOR REPEAT FINGERSTICK NURSING VISIT IN  A 1-2 weeks---NEEDS TO CUT OUT SUGARS AND CARBS.     RPEAT AIC AND TSH LAB ONLY 2 WEEKS

## 2022-11-02 ENCOUNTER — PREP FOR PROCEDURE (OUTPATIENT)
Dept: ADMINISTRATIVE | Age: 72
End: 2022-11-02

## 2022-11-03 RX ORDER — SODIUM CHLORIDE 0.9 % (FLUSH) 0.9 %
5-40 SYRINGE (ML) INJECTION EVERY 12 HOURS SCHEDULED
Status: CANCELLED | OUTPATIENT
Start: 2022-11-03

## 2022-11-03 RX ORDER — SODIUM CHLORIDE 0.9 % (FLUSH) 0.9 %
5-40 SYRINGE (ML) INJECTION PRN
Status: CANCELLED | OUTPATIENT
Start: 2022-11-03

## 2022-11-03 RX ORDER — SODIUM CHLORIDE 9 MG/ML
INJECTION, SOLUTION INTRAVENOUS PRN
Status: CANCELLED | OUTPATIENT
Start: 2022-11-03

## 2022-11-16 ENCOUNTER — OFFICE VISIT (OUTPATIENT)
Dept: FAMILY MEDICINE CLINIC | Facility: CLINIC | Age: 72
End: 2022-11-16
Payer: MEDICARE

## 2022-11-16 VITALS
TEMPERATURE: 98.2 F | DIASTOLIC BLOOD PRESSURE: 70 MMHG | HEIGHT: 72 IN | SYSTOLIC BLOOD PRESSURE: 110 MMHG | OXYGEN SATURATION: 98 % | WEIGHT: 159.6 LBS | BODY MASS INDEX: 21.62 KG/M2 | HEART RATE: 60 BPM

## 2022-11-16 DIAGNOSIS — R73.09 ABNORMAL GLUCOSE: ICD-10-CM

## 2022-11-16 DIAGNOSIS — E03.8 OTHER SPECIFIED HYPOTHYROIDISM: Primary | ICD-10-CM

## 2022-11-16 PROCEDURE — G8420 CALC BMI NORM PARAMETERS: HCPCS | Performed by: FAMILY MEDICINE

## 2022-11-16 PROCEDURE — 1123F ACP DISCUSS/DSCN MKR DOCD: CPT | Performed by: FAMILY MEDICINE

## 2022-11-16 PROCEDURE — 1036F TOBACCO NON-USER: CPT | Performed by: FAMILY MEDICINE

## 2022-11-16 PROCEDURE — 99213 OFFICE O/P EST LOW 20 MIN: CPT | Performed by: FAMILY MEDICINE

## 2022-11-16 PROCEDURE — G8484 FLU IMMUNIZE NO ADMIN: HCPCS | Performed by: FAMILY MEDICINE

## 2022-11-16 PROCEDURE — G8427 DOCREV CUR MEDS BY ELIG CLIN: HCPCS | Performed by: FAMILY MEDICINE

## 2022-11-16 PROCEDURE — 3017F COLORECTAL CA SCREEN DOC REV: CPT | Performed by: FAMILY MEDICINE

## 2022-11-16 ASSESSMENT — PATIENT HEALTH QUESTIONNAIRE - PHQ9
1. LITTLE INTEREST OR PLEASURE IN DOING THINGS: 0
SUM OF ALL RESPONSES TO PHQ QUESTIONS 1-9: 0
SUM OF ALL RESPONSES TO PHQ QUESTIONS 1-9: 0
SUM OF ALL RESPONSES TO PHQ9 QUESTIONS 1 & 2: 0
2. FEELING DOWN, DEPRESSED OR HOPELESS: 0
SUM OF ALL RESPONSES TO PHQ QUESTIONS 1-9: 0
SUM OF ALL RESPONSES TO PHQ QUESTIONS 1-9: 0

## 2022-11-17 LAB
EST. AVERAGE GLUCOSE BLD GHB EST-MCNC: 123 MG/DL
HBA1C MFR BLD: 5.9 % (ref 4.8–5.6)
TSH, 3RD GENERATION: 2.66 UIU/ML (ref 0.36–3.74)

## 2022-11-18 ASSESSMENT — ENCOUNTER SYMPTOMS
NAUSEA: 0
BLOOD IN STOOL: 0
SHORTNESS OF BREATH: 0
CONSTIPATION: 0
COLOR CHANGE: 0
ABDOMINAL DISTENTION: 0
EYE PAIN: 0
COUGH: 0
SORE THROAT: 0
ABDOMINAL PAIN: 0
PHOTOPHOBIA: 0

## 2022-11-19 NOTE — PROGRESS NOTES
Angi Reynoso  1950 is a 67 y.o. male ,Established patient, here for evaluation of the following chief complaint(s):   Chief Complaint   Patient presents with    Follow-up     Pt is fasting- coming in to go over his sugar levels and to have it rechecked. Diabetes          1. Other specified hypothyroidism  -     TSH; Future  2. Abnormal glucose  -     Hemoglobin A1C; Future        Return in about 3 months (around 2/16/2023). Subjective   SUBJECTIVE/OBJECTIVE:  Here to follow abnormal labs    Diabetes  He presents for his initial (aic reading 7.2) diabetic visit. His disease course has been improving. Pertinent negatives for hypoglycemia include no confusion, headaches, pallor or speech difficulty. Pertinent negatives for diabetes include no chest pain and no weakness. Thyroid Problem  Presents for follow-up (he has been taking his meds daily) visit. Patient reports no constipation, depressed mood or palpitations. Review of Systems   Constitutional:  Negative for chills and fever. HENT:  Negative for ear pain, hearing loss and sore throat. Eyes:  Negative for photophobia and pain. Respiratory:  Negative for cough and shortness of breath. Cardiovascular:  Negative for chest pain, palpitations and leg swelling. Gastrointestinal:  Negative for abdominal distention, abdominal pain, blood in stool, constipation and nausea. Genitourinary:  Negative for dysuria and urgency. Musculoskeletal:  Negative for joint swelling and myalgias. Skin:  Negative for color change, pallor and rash. Neurological:  Negative for speech difficulty, weakness, light-headedness and headaches. Hematological:  Negative for adenopathy. Psychiatric/Behavioral:  Negative for agitation, confusion, hallucinations, self-injury and suicidal ideas. Objective   Physical Exam  Constitutional:       Appearance: Normal appearance. HENT:      Head: Normocephalic and atraumatic.       Nose: Nose normal. Eyes:      Extraocular Movements: Extraocular movements intact. Conjunctiva/sclera: Conjunctivae normal.      Pupils: Pupils are equal, round, and reactive to light. Cardiovascular:      Rate and Rhythm: Normal rate and regular rhythm. Pulses: Normal pulses. Heart sounds: Normal heart sounds. Pulmonary:      Effort: Pulmonary effort is normal.      Breath sounds: Normal breath sounds. Abdominal:      General: Abdomen is flat. Bowel sounds are normal.      Palpations: Abdomen is soft. Skin:     General: Skin is warm and dry. Neurological:      General: No focal deficit present. Mental Status: He is alert and oriented to person, place, and time. Psychiatric:         Mood and Affect: Mood normal.                 An electronic signature was used to authenticate this note.     --Rosa Bender MD

## 2022-11-28 ENCOUNTER — OFFICE VISIT (OUTPATIENT)
Dept: FAMILY MEDICINE CLINIC | Facility: CLINIC | Age: 72
End: 2022-11-28
Payer: MEDICARE

## 2022-11-28 VITALS
TEMPERATURE: 97.9 F | DIASTOLIC BLOOD PRESSURE: 66 MMHG | WEIGHT: 160.8 LBS | HEART RATE: 81 BPM | HEIGHT: 72 IN | OXYGEN SATURATION: 97 % | SYSTOLIC BLOOD PRESSURE: 108 MMHG | BODY MASS INDEX: 21.78 KG/M2

## 2022-11-28 DIAGNOSIS — Z00.00 MEDICARE ANNUAL WELLNESS VISIT, SUBSEQUENT: Primary | ICD-10-CM

## 2022-11-28 DIAGNOSIS — Z23 NEED FOR VARICELLA VACCINE: ICD-10-CM

## 2022-11-28 PROCEDURE — G8484 FLU IMMUNIZE NO ADMIN: HCPCS | Performed by: PHYSICIAN ASSISTANT

## 2022-11-28 PROCEDURE — G0439 PPPS, SUBSEQ VISIT: HCPCS | Performed by: PHYSICIAN ASSISTANT

## 2022-11-28 PROCEDURE — 3017F COLORECTAL CA SCREEN DOC REV: CPT | Performed by: PHYSICIAN ASSISTANT

## 2022-11-28 PROCEDURE — 1123F ACP DISCUSS/DSCN MKR DOCD: CPT | Performed by: PHYSICIAN ASSISTANT

## 2022-11-28 RX ORDER — ZOSTER VACCINE RECOMBINANT, ADJUVANTED 50 MCG/0.5
0.5 KIT INTRAMUSCULAR ONCE
Qty: 0.5 ML | Refills: 1 | Status: SHIPPED | OUTPATIENT
Start: 2022-11-28 | End: 2022-11-28

## 2022-11-28 ASSESSMENT — PATIENT HEALTH QUESTIONNAIRE - PHQ9
SUM OF ALL RESPONSES TO PHQ QUESTIONS 1-9: 0
SUM OF ALL RESPONSES TO PHQ9 QUESTIONS 1 & 2: 0
SUM OF ALL RESPONSES TO PHQ QUESTIONS 1-9: 0
2. FEELING DOWN, DEPRESSED OR HOPELESS: 0
SUM OF ALL RESPONSES TO PHQ QUESTIONS 1-9: 0
1. LITTLE INTEREST OR PLEASURE IN DOING THINGS: 0
SUM OF ALL RESPONSES TO PHQ QUESTIONS 1-9: 0

## 2022-11-28 ASSESSMENT — LIFESTYLE VARIABLES
HOW OFTEN DO YOU HAVE A DRINK CONTAINING ALCOHOL: NEVER
HOW MANY STANDARD DRINKS CONTAINING ALCOHOL DO YOU HAVE ON A TYPICAL DAY: PATIENT DOES NOT DRINK

## 2022-11-28 NOTE — PATIENT INSTRUCTIONS
Personalized Preventive Plan for Nallely Chávez - 11/28/2022  Medicare offers a range of preventive health benefits. Some of the tests and screenings are paid in full while other may be subject to a deductible, co-insurance, and/or copay. Some of these benefits include a comprehensive review of your medical history including lifestyle, illnesses that may run in your family, and various assessments and screenings as appropriate. After reviewing your medical record and screening and assessments performed today your provider may have ordered immunizations, labs, imaging, and/or referrals for you. A list of these orders (if applicable) as well as your Preventive Care list are included within your After Visit Summary for your review. Other Preventive Recommendations:    A preventive eye exam performed by an eye specialist is recommended every 1-2 years to screen for glaucoma; cataracts, macular degeneration, and other eye disorders. A preventive dental visit is recommended every 6 months. Try to get at least 150 minutes of exercise per week or 10,000 steps per day on a pedometer . Order or download the FREE \"Exercise & Physical Activity: Your Everyday Guide\" from [employer]. Call 0-881.501.9481 or search [employer] online. You need 1534-9923 mg of calcium and 4119-9880 IU of vitamin D per day. It is possible to meet your calcium requirement with diet alone, but a vitamin D supplement is usually necessary to meet this goal.  When exposed to the sun, use a sunscreen that protects against both UVA and UVB radiation with an SPF of 30 or greater. Reapply every 2 to 3 hours or after sweating, drying off with a towel, or swimming. Always wear a seat belt when traveling in a car. Always wear a helmet when riding a bicycle or motorcycle.

## 2022-11-28 NOTE — PROGRESS NOTES
Medicare Annual Wellness Visit    Yin Guzman is here for Medicare AWV    Assessment & Plan   Medicare annual wellness visit, subsequent    Recommendations for Preventive Services Due: see orders and patient instructions/AVS.  Recommended screening schedule for the next 5-10 years is provided to the patient in written form: see Patient Instructions/AVS.     Return for Medicare Annual Wellness Visit in 1 year. Subjective   The following acute and/or chronic problems were also addressed today:  Discussed my chart     Patient's complete Health Risk Assessment and screening values have been reviewed and are found in Flowsheets. The following problems were reviewed today and where indicated follow up appointments were made and/or referrals ordered. Positive Risk Factor Screenings with Interventions:             General Health and ACP:  General  In general, how would you say your health is?: Very Good  In the past 7 days, have you experienced any of the following: New or Increased Pain, New or Increased Fatigue, Loneliness, Social Isolation, Stress or Anger?: No  Do you get the social and emotional support that you need?: Yes  Do you have a Living Will?: Yes    Advance Directives       Power of  Living Will ACP-Advance Directive ACP-Power of     Not on File Not on File Not on File Not on File          General Health Risk Interventions:  No Living Will: ACP documents already completed- patient asked to provide copy to the office              Objective   Vitals:    11/28/22 1031   BP: 108/66   Pulse: 81   Temp: 97.9 °F (36.6 °C)   SpO2: 97%   Weight: 160 lb 12.8 oz (72.9 kg)   Height: 6' (1.829 m)      Body mass index is 21.81 kg/m². Allergies   Allergen Reactions    Hydrocodone Itching    Tramadol Other (See Comments)     Dizziness, passed out      Prior to Visit Medications    Medication Sig Taking?  Authorizing Provider   omeprazole (PRILOSEC) 40 MG delayed release capsule TAKE 1 CAPSULE EVERY MORNING Yes Alok Matos PA-C   levothyroxine (SYNTHROID) 50 MCG tablet TAKE 1 TABLET EVERY DAY Yes RUDI Bradley NP   DULoxetine (CYMBALTA) 60 MG extended release capsule TAKE 1 CAPSULE EVERY DAY Yes RUDI Bradley NP   Multiple Vitamin (MULTI-VITAMIN DAILY PO) Take 0.5 tablets by mouth daily Yes Historical Provider, MD   aspirin 81 MG EC tablet Take 81 mg by mouth Yes Ar Automatic Reconciliation   atorvastatin (LIPITOR) 10 MG tablet TAKE 1 TABLET NIGHTLY FOR HYPERCHOLESTEROLEMIA Yes Ar Automatic Reconciliation   celecoxib (CELEBREX) 200 MG capsule Take 200 mg by mouth daily as needed Yes Ar Automatic Reconciliation   cyclobenzaprine (FLEXERIL) 5 MG tablet Take 5 mg by mouth 2 times daily as needed Yes Ar Automatic Reconciliation   ferrous sulfate (IRON 325) 325 (65 Fe) MG tablet Take by mouth every evening Yes Ar Automatic Reconciliation   gabapentin (NEURONTIN) 300 MG capsule Take one capsule daily before dinner Yes Ar Automatic Reconciliation   Lutein 6 MG TABS Take 20 mg by mouth daily Yes Ar Automatic Reconciliation   SODIUM FLUORIDE, DENTAL GEL, 1.1 % GEL USE AS DIRECTED Yes Ar Automatic Reconciliation       CareTeam (Including outside providers/suppliers regularly involved in providing care):   Patient Care Team:  Demian Estrada MD as PCP - [facility]  Demian Estrada MD as PCP - [facility] Empaneled Provider     Reviewed and updated this visit:  Allergies  Med Hx  Surg Hx  Soc Hx  Fam Hx

## 2022-12-08 RX ORDER — CALCIUM CARBONATE/VITAMIN D3 600 MG-10
1 TABLET ORAL DAILY
COMMUNITY

## 2022-12-08 NOTE — PERIOP NOTE
Patient verified name, , and procedure. Type: 1a; abbreviated assessment per anesthesia guidelines  Labs per surgeon: none  Labs per anesthesia: none      Instructed pt that they will be notified by the Gi Lab for time of arrival. If any questions please call the GI lab at [phone]. Follow diet and prep instructions per office. May have clear liquids until 4 hours prior to time of arrival.    [place] or shower the night before and the am of surgery with antibacterial soap. No lotions, oils, powders, cologne on skin. No make up, eye make up or jewelry. Wear loose fitting comfortable, clean clothing. Must have adult present in building the entire time . Medications for the day of procedure Aspirin as instructed by surgeon, Duloxetine (Cymbalta), Levothyroxine (Synthroid), Omeprazole (Prilosec)     Hold vitamins and supplements now until after procedure. Hold NSAIDS and Celebrex 5 days prior to procedure     The following discharge instructions reviewed with patient: medication given during procedure may cause drowsiness for several hours, therefore, do not drive or operate machinery for remainder of the day, no alcohol on the day of your procedure, resume regular diet and activity unless otherwise directed, for mild sore throat you may use Cepacol throat lozenges or warm salt water gargles as needed, call your physician for any problems or questions. Patient verbalizes understanding.

## 2022-12-10 ENCOUNTER — ANESTHESIA EVENT (OUTPATIENT)
Dept: ENDOSCOPY | Age: 72
End: 2022-12-10
Payer: MEDICARE

## 2022-12-10 RX ORDER — SODIUM CHLORIDE, SODIUM LACTATE, POTASSIUM CHLORIDE, CALCIUM CHLORIDE 600; 310; 30; 20 MG/100ML; MG/100ML; MG/100ML; MG/100ML
INJECTION, SOLUTION INTRAVENOUS CONTINUOUS
Status: CANCELLED | OUTPATIENT
Start: 2022-12-10

## 2022-12-10 RX ORDER — LIDOCAINE HYDROCHLORIDE 10 MG/ML
1 INJECTION, SOLUTION INFILTRATION; PERINEURAL
Status: CANCELLED | OUTPATIENT
Start: 2022-12-10 | End: 2022-12-11

## 2022-12-12 ENCOUNTER — HOSPITAL ENCOUNTER (OUTPATIENT)
Age: 72
Setting detail: OUTPATIENT SURGERY
Discharge: HOME | End: 2022-12-12
Attending: INTERNAL MEDICINE | Admitting: INTERNAL MEDICINE
Payer: MEDICARE

## 2022-12-12 ENCOUNTER — ANESTHESIA (OUTPATIENT)
Dept: ENDOSCOPY | Age: 72
End: 2022-12-12
Payer: MEDICARE

## 2022-12-12 VITALS
WEIGHT: 160 LBS | TEMPERATURE: 98.6 F | BODY MASS INDEX: 21.67 KG/M2 | HEIGHT: 72 IN | RESPIRATION RATE: 17 BRPM | HEART RATE: 78 BPM | DIASTOLIC BLOOD PRESSURE: 84 MMHG | SYSTOLIC BLOOD PRESSURE: 146 MMHG | OXYGEN SATURATION: 100 %

## 2022-12-12 PROCEDURE — 88312 SPECIAL STAINS GROUP 1: CPT

## 2022-12-12 PROCEDURE — 3700000000 HC ANESTHESIA ATTENDED CARE: Performed by: INTERNAL MEDICINE

## 2022-12-12 PROCEDURE — 2580000003 HC RX 258

## 2022-12-12 PROCEDURE — 7100000010 HC PHASE II RECOVERY - FIRST 15 MIN: Performed by: INTERNAL MEDICINE

## 2022-12-12 PROCEDURE — 88305 TISSUE EXAM BY PATHOLOGIST: CPT

## 2022-12-12 PROCEDURE — 2500000003 HC RX 250 WO HCPCS

## 2022-12-12 PROCEDURE — 7100000011 HC PHASE II RECOVERY - ADDTL 15 MIN: Performed by: INTERNAL MEDICINE

## 2022-12-12 PROCEDURE — 2709999900 HC NON-CHARGEABLE SUPPLY: Performed by: INTERNAL MEDICINE

## 2022-12-12 PROCEDURE — 3700000001 HC ADD 15 MINUTES (ANESTHESIA): Performed by: INTERNAL MEDICINE

## 2022-12-12 PROCEDURE — 3609017700 HC EGD DILATION GASTRIC/DUODENAL STRICTURE: Performed by: INTERNAL MEDICINE

## 2022-12-12 PROCEDURE — 6360000002 HC RX W HCPCS

## 2022-12-12 RX ORDER — PROPOFOL 10 MG/ML
INJECTION, EMULSION INTRAVENOUS PRN
Status: DISCONTINUED | OUTPATIENT
Start: 2022-12-12 | End: 2022-12-12 | Stop reason: SDUPTHER

## 2022-12-12 RX ORDER — PROPOFOL 10 MG/ML
INJECTION, EMULSION INTRAVENOUS CONTINUOUS PRN
Status: DISCONTINUED | OUTPATIENT
Start: 2022-12-12 | End: 2022-12-12 | Stop reason: SDUPTHER

## 2022-12-12 RX ORDER — LIDOCAINE HYDROCHLORIDE 20 MG/ML
INJECTION, SOLUTION EPIDURAL; INFILTRATION; INTRACAUDAL; PERINEURAL PRN
Status: DISCONTINUED | OUTPATIENT
Start: 2022-12-12 | End: 2022-12-12 | Stop reason: SDUPTHER

## 2022-12-12 RX ORDER — SODIUM CHLORIDE, SODIUM LACTATE, POTASSIUM CHLORIDE, CALCIUM CHLORIDE 600; 310; 30; 20 MG/100ML; MG/100ML; MG/100ML; MG/100ML
INJECTION, SOLUTION INTRAVENOUS CONTINUOUS PRN
Status: DISCONTINUED | OUTPATIENT
Start: 2022-12-12 | End: 2022-12-12 | Stop reason: SDUPTHER

## 2022-12-12 RX ADMIN — PROPOFOL 180 MCG/KG/MIN: 10 INJECTION, EMULSION INTRAVENOUS at 06:57

## 2022-12-12 RX ADMIN — LIDOCAINE HYDROCHLORIDE 100 MG: 20 INJECTION, SOLUTION EPIDURAL; INFILTRATION; INTRACAUDAL; PERINEURAL at 06:57

## 2022-12-12 RX ADMIN — PROPOFOL 40 MG: 10 INJECTION, EMULSION INTRAVENOUS at 06:57

## 2022-12-12 RX ADMIN — PROPOFOL 20 MG: 10 INJECTION, EMULSION INTRAVENOUS at 06:58

## 2022-12-12 RX ADMIN — SODIUM CHLORIDE, SODIUM LACTATE, POTASSIUM CHLORIDE, AND CALCIUM CHLORIDE: 600; 310; 30; 20 INJECTION, SOLUTION INTRAVENOUS at 06:46

## 2022-12-12 ASSESSMENT — LIFESTYLE VARIABLES: SMOKING_STATUS: 0

## 2022-12-12 ASSESSMENT — PAIN - FUNCTIONAL ASSESSMENT: PAIN_FUNCTIONAL_ASSESSMENT: NONE - DENIES PAIN

## 2022-12-12 NOTE — ANESTHESIA PRE PROCEDURE
Department of Anesthesiology  Preprocedure Note       Name:  Yin Guzman   Age:  67 y.o.  :  1950                                          MRN:  [id number]         Date:  2022      Surgeon: Destiny Frank):  Nelia Mortimer, MD    Procedure: Procedure(s):  EGD ESOPHAGOGASTRODUODENOSCOPY DILATATION    Medications prior to admission:   Prior to Admission medications    Medication Sig Start Date End Date Taking?  Authorizing Provider   calcium carb-cholecalciferol (CALCIUM 600+D) 600-10 MG-MCG TABS per tab Take 1 tablet by mouth daily    Historical Provider, MD   vitamin D 25 MCG (1000 UT) CAPS Take 1 capsule by mouth daily    Historical Provider, MD   omeprazole (PRILOSEC) 40 MG delayed release capsule TAKE 1 CAPSULE EVERY MORNING 10/2/22   Bette Barrera PA-C   levothyroxine (SYNTHROID) 50 MCG tablet TAKE 1 TABLET EVERY DAY 22   RUDI Barker NP   DULoxetine (CYMBALTA) 60 MG extended release capsule TAKE 1 CAPSULE EVERY DAY 22   RUDI Barker NP   Multiple Vitamin (MULTI-VITAMIN DAILY PO) Take 0.5 tablets by mouth daily    Historical Provider, MD   aspirin 81 MG EC tablet Take 81 mg by mouth    Ar Automatic Reconciliation   atorvastatin (LIPITOR) 10 MG tablet TAKE 1 TABLET NIGHTLY FOR HYPERCHOLESTEROLEMIA 10/6/21   Ar Automatic Reconciliation   celecoxib (CELEBREX) 200 MG capsule Take 200 mg by mouth daily as needed 21   Ar Automatic Reconciliation   cyclobenzaprine (FLEXERIL) 5 MG tablet Take 5 mg by mouth 2 times daily as needed 18   Ar Automatic Reconciliation   ferrous sulfate (IRON 325) 325 (65 Fe) MG tablet Take by mouth every evening    Ar Automatic Reconciliation   gabapentin (NEURONTIN) 300 MG capsule Take one capsule daily before dinner 21   Ar Automatic Reconciliation   Lutein 6 MG TABS Take 20 mg by mouth daily    Ar Automatic Reconciliation   SODIUM FLUORIDE, DENTAL GEL, 1.1 % GEL USE AS DIRECTED 10/8/20   Ar Automatic Reconciliation       Current medications:    No current outpatient medications on file. No current facility-administered medications for this visit. Allergies:     Allergies   Allergen Reactions    Hydrocodone Itching    Tramadol Other (See Comments)     Dizziness, passed out        Problem List:    Patient Active Problem List   Diagnosis Code    Anemia, unspecified D64.9    Cobalamin deficiency E53.8    Malignant neoplasm of base of tongue ([facility] [address] C01    Lumbago M54.50    Mixed hyperlipidemia E78.2    Reflux esophagitis K21.00    Iron deficiency anemia due to chronic blood loss D50.0    Postablative hypothyroidism E89.0    Syncope R55    Age-related osteoporosis without current pathological fracture M81.0       Past Medical History:        Diagnosis Date    Arthritis     osteoporosis in joints    COVID 11/13/2021    no hospitalization    DJD (degenerative joint disease)     back    Dysphagia, unspecified(787.20)     followed by dr Carmen Jett    Esophageal stricture     has esophagus stretched every 3-4 months    GERD (gastroesophageal reflux disease)     OMEPRAZOLE DAILY- WELL CONTROLLED     History of squamous cell carcinoma 2008    at base of tongue/throat  surg, chemo and radiation    Hypercholesteremia     Hypothyroid     manaaged with medication    Osteoporosis     Peripheral neuropathy     chemo induced    Personal history of colonic polyps     PUD (peptic ulcer disease)     2015    Throat cancer ([facility] [address] Dx 2009    throat-radiation, chemo    Vitamin D deficiency        Past Surgical History:        Procedure Laterality Date    CATARACT REMOVAL Bilateral     COLONOSCOPY N/A 12/2/2019    COLONOSCOPY/BMI 22 performed by Erick Ferris MD at [address] [place] [address]      right hand-ganglion    HEENT      cleaned out lymph nodes throat after chemo    LUMBAR LAMINECTOMY  2002    LYMPH NODE DISSECTION Left 2009    neck    VT ABDOMEN SURGERY PROC UNLISTED  2009    PEG PLACED  then removed    TONSILLECTOMY  1972    UPPER GASTROINTESTINAL ENDOSCOPY      EGD EVERY 4 MONTHS/ DILATIONS    UPPER GASTROINTESTINAL ENDOSCOPY N/A 2022    EGD ESOPHAGOGASTRODUODENOSCOPY  performed by Shlomo Salas MD at [facility] [address]/ 2008    placed and removal       Social History:    Social History     Tobacco Use    Smoking status: Former     Packs/day: 0.50     Years: 13.00     Pack years: 6.50     Types: Cigarettes     Quit date: 1977     Years since quittin.9    Smokeless tobacco: Never   Substance Use Topics    Alcohol use: No                                Counseling given: Not Answered      Vital Signs (Current): There were no vitals filed for this visit.                                            BP Readings from Last 3 Encounters:   22 139/77   22 108/66   22 110/70       NPO Status:                                                                                 BMI:   Wt Readings from Last 3 Encounters:   22 160 lb (72.6 kg)   22 160 lb 12.8 oz (72.9 kg)   22 159 lb 9.6 oz (72.4 kg)     There is no height or weight on file to calculate BMI.    CBC:   Lab Results   Component Value Date/Time    WBC 4.7 10/20/2022 08:33 AM    RBC 4.23 10/20/2022 08:33 AM    HGB 13.6 10/20/2022 08:33 AM    HCT 43.6 10/20/2022 08:33 AM    .1 10/20/2022 08:33 AM    RDW 13.8 10/20/2022 08:33 AM     10/20/2022 08:33 AM       CMP:   Lab Results   Component Value Date/Time     10/20/2022 08:33 AM    K 3.9 10/20/2022 08:33 AM     10/20/2022 08:33 AM    CO2 28 10/20/2022 08:33 AM    BUN 23 10/20/2022 08:33 AM    CREATININE 1.00 10/20/2022 08:33 AM    GFRAA >60 2022 09:17 AM    AGRATIO 2.2 04/15/2022 10:34 AM    LABGLOM >60 10/20/2022 08:33 AM    GLUCOSE 87 10/20/2022 08:33 AM    PROT 6.8 10/20/2022 08:33 AM    CALCIUM 8.7 10/20/2022 08:33 AM    BILITOT 0.4 10/20/2022 08:33 AM    ALKPHOS 74 10/20/2022 08:33 AM ALKPHOS 84 04/15/2022 10:34 AM    AST 21 10/20/2022 08:33 AM    ALT 30 10/20/2022 08:33 AM       POC Tests: No results for input(s): POCGLU, POCNA, POCK, POCCL, POCBUN, POCHEMO, POCHCT in the last 72 hours. Coags: No results found for: PROTIME, INR, APTT    HCG (If Applicable): No results found for: PREGTESTUR, PREGSERUM, HCG, HCGQUANT     ABGs: No results found for: PHART, PO2ART, VNP4AMA, ZZE0XVE, BEART, M4BOWTQO     Type & Screen (If Applicable):  No results found for: LABABO, LABRH    Drug/Infectious Status (If Applicable):  No results found for: HIV, HEPCAB    COVID-19 Screening (If Applicable):   Lab Results   Component Value Date/Time    COVID19 Performed 12/23/2021 11:20 AM    COVID19 Not Detected 12/23/2021 11:20 AM           Anesthesia Evaluation  Patient summary reviewed and Nursing notes reviewed  Airway: Mallampati: II  TM distance: >3 FB   Neck ROM: full  Mouth opening: > = 3 FB   Dental:    (+) partials and poor dentition      Pulmonary:Negative Pulmonary ROS breath sounds clear to auscultation      (-) not a current smoker                           Cardiovascular:  Exercise tolerance: good (>4 METS),   (+) hyperlipidemia        Rhythm: regular  Rate: normal                    Neuro/Psych:   Negative Neuro/Psych ROS              GI/Hepatic/Renal:   (+) GERD:,           Endo/Other:    (+) hypothyroidism::., malignancy/cancer (base of tongue s/p XRT). Abdominal:             Vascular: negative vascular ROS. Other Findings:             Anesthesia Plan      TIVA     ASA 3       Induction: intravenous. Anesthetic plan and risks discussed with patient. Plan discussed with CRNA.                     Farhad Davis DO   12/12/2022

## 2022-12-12 NOTE — H&P
Gastroenterology Outpatient History and Physical    Patient Dino Guillen    Physician: Ni Nelson    Vital Signs: as per nurses notes    Allergies: as per nurses notes    Chief Complaint: Dysphagia, history of a radiation stricture    History of Present Illness: as above    Justification for Procedure: as above    History: SCC of the base of the tongue, hypothyroidism, Iron deficiencyanemia    Medications: as per nurses noted    Physical Exam:   Heart: regular rate and rhythm, S1, S2 normal, no murmur, click, rub or gallop and regular rate and rhythm   Lungs:  Clear to A and P   Abdominal: Bowel sounds are normal, liver is not enlarged, spleen is not enlarged     Findings/Diagnosis:Dysphagia, history of a proximal esophageal stricture    Plan of Care/Planned Procedure: EGD with dilatation. Risks (Bleed, perforation, infection, untoward CV or pulm. Events, mortality, etc) benefits, and alternatives discussed with patient, who agrees to proceed.   Lindsay Valente MD

## 2022-12-12 NOTE — OP NOTE
[facility]  OPERATIVE REPORT    Name:  Shweta Joe  MR#:  [id number]  :  1950  ACCOUNT #:  [de-identified]  DATE OF SERVICE:  2022    PREOPERATIVE DIAGNOSIS:  Dysphagia associated with a proximal esophageal stricture. POSTOPERATIVE DIAGNOSES:  1. Dysphagia associated with a proximal esophageal stricture. 2.  Gastritis. PROCEDURES PERFORMED:  1. EGD with biopsy. 2.  EGD with [facility] dilatation. PRIMARY GASTROENTEROLOGIST:  Rossy Perez. MD Cathie    SURGEON:  None. ASSISTANT:  None. ANESTHESIA:  Per MAC anesthesia. COMPLICATIONS:  None. SPECIMENS REMOVED:  To laboratory:  1. Esophageal biopsies. 2.  Gastric biopsies. IMPLANTS:  None. ESTIMATED BLOOD LOSS:  0-1 mL. INSTRUMENT:  GIF H-190 videoscope. PROCEDURE:  The patient was anesthetized and positioned, and the video endoscope was passed through the hypopharynx and esophagus with minimal difficulty. There was what appeared to be a slight scar/stricture at and just below the cricopharyngeus. Otherwise, the proximal, mid, and distal esophagus were unremarkable. Once inside the stomach, the body was unremarkable. However, in the antrum was there streaky erythema present. A retroflexed view of the angularis was normal.  A retroflexed view of the EG junction and cardia revealed no other specific abnormalities. Attention was then turned to the duodenum. Duodenal bulb and C-loop were normal.  The endoscope was backed into the stomach. We decided to proceed with [facility] dilatations. #36, #42, and #48 Marks dilators were serially passed through the hypopharynx into the esophagus. After the 43 had passed, an endoscopy was performed showing no obvious esophageal trauma. There was no heme on the dilators. After the 48-[place] citizen had been passed, there was no significant heme at the cricopharyngeus. We stopped here because there was some resistance of the dilator at this point in time.   It should be noted that some repositioning of the position of the head was necessary to allow the dilator to be passed through the hypopharynx into the esophagus. Gastric and esophageal biopsies were then obtained. As the endoscope was backed into the esophagus, no other abnormalities were appreciated. However, in the hypopharynx, ectatic vessels were noted consistent with previous radiation effect. The endoscope was removed from the patient. The patient tolerated the procedure well and brought to the recovery area in stable condition. IMPRESSION:  1. Subtle stricture at and just below the cricopharyngeus - dilated as described above. 2.  Antral gastritis. 3.  Gastric and esophageal biopsies obtained here. PLAN:  Diagnostics:  1. Follow up biopsies. 2.  Follow up dilatation in approximately four months as per usual schedule unless necessary earlier. Therapeutics:  Continue PPI therapy.         Anastacio Kelly MD      MR/[id number]/  D:  12/12/2022 7:33  T:  12/12/2022 10:22  JOB #:  [id number]  CC:  [facility]       MD Nate Blandon MD

## 2022-12-12 NOTE — DISCHARGE INSTRUCTIONS
Gastrointestinal Esophagogastroduodenoscopy (EGD) - Upper Exam Discharge Instructions    1. Call Dr. Josiane Brand at [phone] for any problems or questions. 2. Contact the doctor's office for follow up appointment as directed. 3. Medication may cause drowsiness for several hours, therefore:  Do not drive or operate machinery for remainder of the day. No alcohol today. Do not make any important or legal decisions for 24 hours. Do not sign any legal documents for 24 hours. 5. Ordinarily, you may resume regular diet and activity after exam unless otherwise specified by your physician. 6. For mild soreness in your throat you may use Cepacol throat lozenges or warm salt-water gargles as needed. Any additional instructions:    Repeat in 4 months.

## 2022-12-12 NOTE — ANESTHESIA POSTPROCEDURE EVALUATION
Department of Anesthesiology  Postprocedure Note    Patient: Nallely Chávez  MRN: [id number]  YOB: 1950  Date of evaluation: 12/12/2022      Procedure Summary     Date: 12/12/22 Room / Location: [facility] ENDO 04 / [facility] ENDOSCOPY    Anesthesia Start: 2995 Anesthesia Stop: 0720    Procedure: EGD ESOPHAGOGASTRODUODENOSCOPY DILATATION/bxs (Upper GI Region) Diagnosis: Malignant neoplasm of tongue ([address]    Surgeons: Francheska Roper MD Responsible Provider: Rian Morales DO    Anesthesia Type: TIVA ASA Status: 3          Anesthesia Type: TIVA    Sharath Phase I: Sharath Score: 10    Sharath Phase II: Sharath Score: 10      Anesthesia Post Evaluation    Patient location during evaluation: PACU  Level of consciousness: awake and alert  Airway patency: patent  Nausea & Vomiting: no nausea  Complications: no  Cardiovascular status: hemodynamically stable  Respiratory status: acceptable  Hydration status: euvolemic

## 2023-01-26 ENCOUNTER — TELEMEDICINE (OUTPATIENT)
Dept: FAMILY MEDICINE CLINIC | Facility: CLINIC | Age: 73
End: 2023-01-26
Payer: MEDICARE

## 2023-01-26 DIAGNOSIS — E78.2 MIXED HYPERLIPIDEMIA: ICD-10-CM

## 2023-01-26 DIAGNOSIS — E55.9 VITAMIN D DEFICIENCY: ICD-10-CM

## 2023-01-26 DIAGNOSIS — K22.2 ESOPHAGEAL STRICTURE: ICD-10-CM

## 2023-01-26 DIAGNOSIS — E03.8 OTHER SPECIFIED HYPOTHYROIDISM: ICD-10-CM

## 2023-01-26 DIAGNOSIS — R73.09 ABNORMAL GLUCOSE: Primary | ICD-10-CM

## 2023-01-26 PROCEDURE — 1036F TOBACCO NON-USER: CPT | Performed by: FAMILY MEDICINE

## 2023-01-26 PROCEDURE — 1123F ACP DISCUSS/DSCN MKR DOCD: CPT | Performed by: FAMILY MEDICINE

## 2023-01-26 PROCEDURE — G8428 CUR MEDS NOT DOCUMENT: HCPCS | Performed by: FAMILY MEDICINE

## 2023-01-26 PROCEDURE — G8420 CALC BMI NORM PARAMETERS: HCPCS | Performed by: FAMILY MEDICINE

## 2023-01-26 PROCEDURE — G8484 FLU IMMUNIZE NO ADMIN: HCPCS | Performed by: FAMILY MEDICINE

## 2023-01-26 PROCEDURE — 3017F COLORECTAL CA SCREEN DOC REV: CPT | Performed by: FAMILY MEDICINE

## 2023-01-26 PROCEDURE — 99214 OFFICE O/P EST MOD 30 MIN: CPT | Performed by: FAMILY MEDICINE

## 2023-01-26 ASSESSMENT — ENCOUNTER SYMPTOMS
SHORTNESS OF BREATH: 0
PHOTOPHOBIA: 0
ABDOMINAL PAIN: 0
NAUSEA: 0
SORE THROAT: 0
BLOOD IN STOOL: 0
COUGH: 0
EYE PAIN: 0
COLOR CHANGE: 0
ABDOMINAL DISTENTION: 0
DIARRHEA: 0

## 2023-01-27 NOTE — PROGRESS NOTES
Saul Marlow (:  1950) is a Established patient, here for evaluation of the following:    Assessment & Plan   Below is the assessment and plan developed based on review of pertinent history, physical exam, labs, studies, and medications. 1. Abnormal glucose  -     Hemoglobin A1C; Future  2. Mixed hyperlipidemia  -     Lipid Panel; Future  3. Vitamin D deficiency  -     Vitamin D 25 Hydroxy; Future  4. Esophageal stricture  -     CBC with Auto Differential; Future  -     Comprehensive Metabolic Panel; Future  5. Other specified hypothyroidism  -     TSH with Reflex; Future    No follow-ups on file. Subjective   He has most recently had ESOPHAGEAL DILATION and is swallowing better. Thyroid Problem  Presents for follow-up visit. Patient reports no diarrhea or palpitations. The symptoms have been stable. His past medical history is significant for hyperlipidemia. Hyperlipidemia  This is a chronic problem. The current episode started more than 1 year ago. The problem is controlled. Recent lipid tests were reviewed and are normal. Pertinent negatives include no chest pain, myalgias or shortness of breath. Review of Systems   Constitutional:  Negative for chills and fever. HENT:  Negative for ear pain, hearing loss and sore throat. Eyes:  Negative for photophobia and pain. Respiratory:  Negative for cough and shortness of breath. Cardiovascular:  Negative for chest pain, palpitations and leg swelling. Gastrointestinal:  Negative for abdominal distention, abdominal pain, blood in stool, diarrhea and nausea. Genitourinary:  Negative for dysuria and urgency. Musculoskeletal:  Negative for joint swelling and myalgias. Skin:  Negative for color change, pallor and rash. Neurological:  Negative for speech difficulty, weakness, light-headedness and headaches. Hematological:  Negative for adenopathy.    Psychiatric/Behavioral:  Negative for agitation, confusion, hallucinations, self-injury and suicidal ideas. Objective   Patient-Reported Vitals  No data recorded     Physical Exam             Chuckie Kussmaul, was evaluated through a synchronous (real-time) audio-video encounter. The patient (or guardian if applicable) is aware that this is a billable service, which includes applicable co-pays. This Virtual Visit was conducted with patient's (and/or legal guardian's) consent. The visit was conducted pursuant to the emergency declaration under the [address] [place] [address], [address] [place] [address] authority and the [employer] and [employer] General Act. Patient identification was verified, and a caregiver was present when appropriate. The patient was located at Home: [address] [place] [id number]. Provider was located at [facility] (Appt Dept): [address],  [address].         --Matthew Wilkes MD

## 2023-02-27 RX ORDER — ATORVASTATIN CALCIUM 10 MG/1
TABLET, FILM COATED ORAL
Qty: 90 TABLET | Refills: 3 | Status: SHIPPED | OUTPATIENT
Start: 2023-02-27

## 2023-05-03 RX ORDER — GABAPENTIN 300 MG/1
CAPSULE ORAL
Qty: 90 CAPSULE | Refills: 0 | Status: SHIPPED | OUTPATIENT
Start: 2023-05-03 | End: 2023-06-02

## 2023-05-12 RX ORDER — OMEPRAZOLE 40 MG/1
CAPSULE, DELAYED RELEASE ORAL
Qty: 90 CAPSULE | Refills: 1 | Status: SHIPPED | OUTPATIENT
Start: 2023-05-12

## 2023-05-12 RX ORDER — DULOXETIN HYDROCHLORIDE 60 MG/1
CAPSULE, DELAYED RELEASE ORAL
Qty: 90 CAPSULE | Refills: 2 | Status: SHIPPED | OUTPATIENT
Start: 2023-05-12

## 2023-05-12 RX ORDER — LEVOTHYROXINE SODIUM 0.05 MG/1
TABLET ORAL
Qty: 90 TABLET | Refills: 2 | Status: SHIPPED | OUTPATIENT
Start: 2023-05-12

## 2023-12-14 ENCOUNTER — OFFICE VISIT (OUTPATIENT)
Dept: FAMILY MEDICINE CLINIC | Facility: CLINIC | Age: 73
End: 2023-12-14
Payer: MEDICARE

## 2023-12-14 VITALS
SYSTOLIC BLOOD PRESSURE: 106 MMHG | HEIGHT: 72 IN | WEIGHT: 153.6 LBS | OXYGEN SATURATION: 97 % | BODY MASS INDEX: 20.8 KG/M2 | TEMPERATURE: 98.2 F | HEART RATE: 78 BPM | DIASTOLIC BLOOD PRESSURE: 70 MMHG

## 2023-12-14 DIAGNOSIS — D50.8 OTHER IRON DEFICIENCY ANEMIA: ICD-10-CM

## 2023-12-14 DIAGNOSIS — E78.2 MIXED HYPERLIPIDEMIA: ICD-10-CM

## 2023-12-14 DIAGNOSIS — Z00.00 MEDICARE ANNUAL WELLNESS VISIT, SUBSEQUENT: Primary | ICD-10-CM

## 2023-12-14 DIAGNOSIS — C02.9 MALIGNANT NEOPLASM OF TONGUE (HCC): ICD-10-CM

## 2023-12-14 DIAGNOSIS — Z12.5 SCREENING FOR PROSTATE CANCER: ICD-10-CM

## 2023-12-14 DIAGNOSIS — D69.6 THROMBOCYTOPENIA, UNSPECIFIED (HCC): ICD-10-CM

## 2023-12-14 LAB
ALBUMIN SERPL-MCNC: 3.9 G/DL (ref 3.2–4.6)
ALBUMIN/GLOB SERPL: 1.2 (ref 0.4–1.6)
ALP SERPL-CCNC: 64 U/L (ref 50–136)
ALT SERPL-CCNC: 32 U/L (ref 12–65)
ANION GAP SERPL CALC-SCNC: 2 MMOL/L (ref 2–11)
AST SERPL-CCNC: 26 U/L (ref 15–37)
BASOPHILS # BLD: 0.1 K/UL (ref 0–0.2)
BASOPHILS NFR BLD: 1 % (ref 0–2)
BILIRUB SERPL-MCNC: 0.5 MG/DL (ref 0.2–1.1)
BUN SERPL-MCNC: 20 MG/DL (ref 8–23)
CALCIUM SERPL-MCNC: 9.4 MG/DL (ref 8.3–10.4)
CHLORIDE SERPL-SCNC: 107 MMOL/L (ref 103–113)
CHOLEST SERPL-MCNC: 148 MG/DL
CO2 SERPL-SCNC: 31 MMOL/L (ref 21–32)
CREAT SERPL-MCNC: 1.1 MG/DL (ref 0.8–1.5)
DIFFERENTIAL METHOD BLD: ABNORMAL
EOSINOPHIL # BLD: 0.1 K/UL (ref 0–0.8)
EOSINOPHIL NFR BLD: 3 % (ref 0.5–7.8)
ERYTHROCYTE [DISTWIDTH] IN BLOOD BY AUTOMATED COUNT: 13.5 % (ref 11.9–14.6)
FERRITIN SERPL-MCNC: 86 NG/ML (ref 8–388)
GLOBULIN SER CALC-MCNC: 3.3 G/DL (ref 2.8–4.5)
GLUCOSE SERPL-MCNC: 95 MG/DL (ref 65–100)
HCT VFR BLD AUTO: 42.3 % (ref 41.1–50.3)
HDLC SERPL-MCNC: 42 MG/DL (ref 40–60)
HDLC SERPL: 3.5
HGB BLD-MCNC: 13.4 G/DL (ref 13.6–17.2)
IMM GRANULOCYTES # BLD AUTO: 0 K/UL (ref 0–0.5)
IMM GRANULOCYTES NFR BLD AUTO: 0 % (ref 0–5)
IRON SERPL-MCNC: 99 UG/DL (ref 35–150)
LDLC SERPL CALC-MCNC: 91.6 MG/DL
LYMPHOCYTES # BLD: 1.3 K/UL (ref 0.5–4.6)
LYMPHOCYTES NFR BLD: 25 % (ref 13–44)
MCH RBC QN AUTO: 31.5 PG (ref 26.1–32.9)
MCHC RBC AUTO-ENTMCNC: 31.7 G/DL (ref 31.4–35)
MCV RBC AUTO: 99.5 FL (ref 82–102)
MONOCYTES # BLD: 0.6 K/UL (ref 0.1–1.3)
MONOCYTES NFR BLD: 12 % (ref 4–12)
NEUTS SEG # BLD: 3 K/UL (ref 1.7–8.2)
NEUTS SEG NFR BLD: 59 % (ref 43–78)
NRBC # BLD: 0 K/UL (ref 0–0.2)
PLATELET # BLD AUTO: 222 K/UL (ref 150–450)
PMV BLD AUTO: 12 FL (ref 9.4–12.3)
POTASSIUM SERPL-SCNC: 4.3 MMOL/L (ref 3.5–5.1)
PROT SERPL-MCNC: 7.2 G/DL (ref 6.3–8.2)
RBC # BLD AUTO: 4.25 M/UL (ref 4.23–5.6)
SODIUM SERPL-SCNC: 140 MMOL/L (ref 136–146)
TRIGL SERPL-MCNC: 72 MG/DL (ref 35–150)
VLDLC SERPL CALC-MCNC: 14.4 MG/DL (ref 6–23)
WBC # BLD AUTO: 5.1 K/UL (ref 4.3–11.1)

## 2023-12-14 PROCEDURE — G8484 FLU IMMUNIZE NO ADMIN: HCPCS | Performed by: FAMILY MEDICINE

## 2023-12-14 PROCEDURE — 3017F COLORECTAL CA SCREEN DOC REV: CPT | Performed by: FAMILY MEDICINE

## 2023-12-14 PROCEDURE — G0439 PPPS, SUBSEQ VISIT: HCPCS | Performed by: FAMILY MEDICINE

## 2023-12-14 PROCEDURE — 1123F ACP DISCUSS/DSCN MKR DOCD: CPT | Performed by: FAMILY MEDICINE

## 2023-12-14 ASSESSMENT — PATIENT HEALTH QUESTIONNAIRE - PHQ9
SUM OF ALL RESPONSES TO PHQ QUESTIONS 1-9: 0
SUM OF ALL RESPONSES TO PHQ QUESTIONS 1-9: 0
1. LITTLE INTEREST OR PLEASURE IN DOING THINGS: 0
SUM OF ALL RESPONSES TO PHQ9 QUESTIONS 1 & 2: 0
2. FEELING DOWN, DEPRESSED OR HOPELESS: 0
SUM OF ALL RESPONSES TO PHQ QUESTIONS 1-9: 0
SUM OF ALL RESPONSES TO PHQ QUESTIONS 1-9: 0

## 2023-12-15 LAB — PSA SERPL-MCNC: 5.2 NG/ML

## 2024-01-02 RX ORDER — OMEPRAZOLE 40 MG/1
CAPSULE, DELAYED RELEASE ORAL
Qty: 90 CAPSULE | Refills: 3 | Status: SHIPPED | OUTPATIENT
Start: 2024-01-02

## 2024-01-11 ENCOUNTER — OFFICE VISIT (OUTPATIENT)
Dept: FAMILY MEDICINE CLINIC | Facility: CLINIC | Age: 74
End: 2024-01-11
Payer: MEDICARE

## 2024-01-11 VITALS
BODY MASS INDEX: 20.61 KG/M2 | DIASTOLIC BLOOD PRESSURE: 70 MMHG | TEMPERATURE: 98.2 F | OXYGEN SATURATION: 100 % | HEART RATE: 50 BPM | SYSTOLIC BLOOD PRESSURE: 110 MMHG | WEIGHT: 152 LBS

## 2024-01-11 DIAGNOSIS — Z80.42 FAMILY HISTORY OF PROSTATE CARCINOMA: ICD-10-CM

## 2024-01-11 DIAGNOSIS — R97.20 ELEVATED PSA: Primary | ICD-10-CM

## 2024-01-11 DIAGNOSIS — C02.9 MALIGNANT NEOPLASM OF TONGUE (HCC): ICD-10-CM

## 2024-01-11 DIAGNOSIS — C01 MALIGNANT NEOPLASM OF BASE OF TONGUE (HCC): ICD-10-CM

## 2024-01-11 DIAGNOSIS — M81.0 AGE-RELATED OSTEOPOROSIS WITHOUT CURRENT PATHOLOGICAL FRACTURE: ICD-10-CM

## 2024-01-11 DIAGNOSIS — D69.6 THROMBOCYTOPENIA, UNSPECIFIED (HCC): ICD-10-CM

## 2024-01-11 PROCEDURE — G8420 CALC BMI NORM PARAMETERS: HCPCS | Performed by: FAMILY MEDICINE

## 2024-01-11 PROCEDURE — 3017F COLORECTAL CA SCREEN DOC REV: CPT | Performed by: FAMILY MEDICINE

## 2024-01-11 PROCEDURE — 99214 OFFICE O/P EST MOD 30 MIN: CPT | Performed by: FAMILY MEDICINE

## 2024-01-11 PROCEDURE — 1036F TOBACCO NON-USER: CPT | Performed by: FAMILY MEDICINE

## 2024-01-11 PROCEDURE — G8484 FLU IMMUNIZE NO ADMIN: HCPCS | Performed by: FAMILY MEDICINE

## 2024-01-11 PROCEDURE — 1123F ACP DISCUSS/DSCN MKR DOCD: CPT | Performed by: FAMILY MEDICINE

## 2024-01-11 PROCEDURE — G8427 DOCREV CUR MEDS BY ELIG CLIN: HCPCS | Performed by: FAMILY MEDICINE

## 2024-01-11 ASSESSMENT — PATIENT HEALTH QUESTIONNAIRE - PHQ9
SUM OF ALL RESPONSES TO PHQ QUESTIONS 1-9: 0
2. FEELING DOWN, DEPRESSED OR HOPELESS: 0
SUM OF ALL RESPONSES TO PHQ QUESTIONS 1-9: 0
SUM OF ALL RESPONSES TO PHQ9 QUESTIONS 1 & 2: 0
SUM OF ALL RESPONSES TO PHQ QUESTIONS 1-9: 0
1. LITTLE INTEREST OR PLEASURE IN DOING THINGS: 0
SUM OF ALL RESPONSES TO PHQ QUESTIONS 1-9: 0

## 2024-01-13 ASSESSMENT — ENCOUNTER SYMPTOMS
ABDOMINAL DISTENTION: 0
ABDOMINAL PAIN: 0
BLOOD IN STOOL: 0
HOARSE VOICE: 0
SORE THROAT: 0
PHOTOPHOBIA: 0
COLOR CHANGE: 0
SHORTNESS OF BREATH: 0
NAUSEA: 0
COUGH: 0
EYE PAIN: 0

## 2024-01-13 NOTE — PROGRESS NOTES
Zachariah Kiran  1950 is a 73 y.o. male ,Established patient, here for evaluation of the following chief complaint(s):   Chief Complaint   Patient presents with    Follow-up     Coming in to discuss labs-           1. Elevated PSA  -     [facility] DO, Urology, [place]  -     AMB POC URINALYSIS DIP STICK AUTO W/O MICRO  2. Malignant neoplasm of tongue (HCC)  3. Thrombocytopenia, unspecified (HCC)  Assessment & Plan:   At goal, continue current medications and continue to monitor  4. Family history of prostate carcinoma  -     [facility], Urology, [place]  -     AMB POC URINALYSIS DIP STICK AUTO W/O MICRO  5. Age-related osteoporosis without current pathological fracture  Assessment & Plan:   At goal, continue current medications and continue current treatment plan  6. 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        Subjective   SUBJECTIVE/OBJECTIVE:  Here to follow psa    Thyroid Problem  Presents for follow-up visit. Patient reports no anxiety, depressed mood, hoarse voice, nail problem or palpitations.       Review of Systems   Constitutional:  Negative for chills and fever.   HENT:  Negative for ear pain, hearing loss, hoarse voice and sore throat.    Eyes:  Negative for photophobia and pain.   Respiratory:  Negative for cough and shortness of breath.    Cardiovascular:  Negative for chest pain, palpitations and leg swelling.   Gastrointestinal:  Negative for abdominal distention, abdominal pain, blood in stool and nausea.   Genitourinary:  Negative for dysuria and urgency.   Musculoskeletal:  Negative for joint swelling and myalgias.   Skin:  Negative for color change, pallor and rash.   Neurological:  Negative for speech difficulty, weakness, light-headedness and headaches.   Hematological:  Negative for adenopathy.

## 2024-02-14 DIAGNOSIS — R97.20 ELEVATED PSA: Primary | ICD-10-CM

## 2024-02-15 NOTE — PROGRESS NOTES
NEW PATIENT INTAKE    Referral Diagnosis: Elevated PSA; Family history of prostate carcinoma    Referring Provider: Hugh Vance MD    Primary Care Provider: Hugh Vance MD    Family History of Cancer/ Hematology Disorders: Family history significant for mother with pancreatic and breast cancers.    Presenting Symptoms: Elevated PSA    Chronological History of Pertinent Events:     73-year-old white male    PMH: Arthritis, Chronic back pain, DJD, Dysphagia, Esophageal stricture, GERD, Hypercholesteremia, History of SCC, Hypothyroid, Osteoporosis, Peripheral neuropathy, history of colonic polyps, PUD, Throat cancer, Vitamin D deficiency  Followed by [facility] (history of oral cancer), Urology    12/14/23 - Met with PCP ([facility])  Here for 3-month follow-up visit and AWV  Patient being followed by [facility] for history of oral cancer.  Abnormal labs: Hgb - 13.4; PSA elevated at 5.2. (EPIC)    12/21/23 - TE with PCP ([facility])  Notified patient of elevated PSA level.  This inidicates a sudden enlargement of prostate, but malignancy must be ruled out.  Will placed referral with Urology    1/11/24 - Met with PCP ([facility])  Here to discuss PSA results.  Referral has been placed with Urology.  Urinalysis ordered.  RTC in 2 months    A referral has been placed with [facility] for a Medical Urologic/Oncology consultation and treatment.    (EPIC)   Latest Reference Range & Units 11/22/19 09:10 04/14/21 09:15 04/15/22 10:34 12/14/23 10:25   Prostatic Specific Ag <4.0 ng/mL 3.6 3.7 3.6 5.2 (H)     Notes from Referring Provider: N/A    Presented at Tumor Board: No    Other Pertinent Information: N/A

## 2024-02-16 ENCOUNTER — OFFICE VISIT (OUTPATIENT)
Dept: ONCOLOGY | Age: 74
End: 2024-02-16

## 2024-02-16 ENCOUNTER — HOSPITAL ENCOUNTER (OUTPATIENT)
Dept: LAB | Age: 74
End: 2024-02-16
Payer: MEDICARE

## 2024-02-16 VITALS
OXYGEN SATURATION: 100 % | HEIGHT: 72 IN | RESPIRATION RATE: 16 BRPM | SYSTOLIC BLOOD PRESSURE: 119 MMHG | HEART RATE: 76 BPM | TEMPERATURE: 97.9 F | WEIGHT: 152.9 LBS | BODY MASS INDEX: 20.71 KG/M2 | DIASTOLIC BLOOD PRESSURE: 79 MMHG

## 2024-02-16 DIAGNOSIS — R97.20 ELEVATED PSA: Primary | ICD-10-CM

## 2024-02-16 DIAGNOSIS — R97.20 ELEVATED PSA: ICD-10-CM

## 2024-02-16 LAB
PSA FREE MFR SERPL: 9.1 %
PSA FREE SERPL-MCNC: 0.4 NG/ML
PSA SERPL-MCNC: 4.4 NG/ML

## 2024-02-16 PROCEDURE — 36415 COLL VENOUS BLD VENIPUNCTURE: CPT

## 2024-02-16 PROCEDURE — 84153 ASSAY OF PSA TOTAL: CPT

## 2024-02-16 PROCEDURE — 1036F TOBACCO NON-USER: CPT | Performed by: UROLOGY

## 2024-02-16 PROCEDURE — 84154 ASSAY OF PSA FREE: CPT

## 2024-02-16 PROCEDURE — 3017F COLORECTAL CA SCREEN DOC REV: CPT | Performed by: UROLOGY

## 2024-02-16 RX ORDER — CYANOCOBALAMIN (VITAMIN B-12) 500 MCG
TABLET ORAL
COMMUNITY

## 2024-02-16 RX ORDER — ASCORBIC ACID 500 MG
500 TABLET ORAL DAILY
COMMUNITY

## 2024-02-16 NOTE — PROGRESS NOTES
Urologic Oncology  [facility] Hematology & Oncology  [address] [place] [id number]  [phone]          Zachariah Kiran  : 1950      INITIAL EVALUATION    Chief Complaint   Patient presents with    New Patient       HPI:    Zachariah Kiran is a 73 y.o. male who is referred for evaluation of elevated PSA.  He had routine screening labs on 2023 with a PSA of 5.2.  This had previously been 3.6-3.7 over the last several years.    He denies any significant lower urinary tract symptoms.  No dysuria, hematuria.  He empties his bladder and voids with good stream.  He states that most he will get up once at night to void.    Patient reports that his dad was diagnosed in his mid 80s with prostate cancer, treated by prostatectomy and lived into his mid 90s.  He is unsure of his brothers prostate history.      Patient medical history significant for head neck cancer treated with chemoradiation therapy in 2008 now with no evidence of disease.  Also hypothyroid, high cholesterol, GERD.  He takes a baby aspirin daily but no other blood thinners or anticoagulation.    PMH:     Past Medical History:   Diagnosis Date    Arthritis     osteoporosis in joints    Cataract     Chronic back pain     COVID 2021    no hospitalization    DJD (degenerative joint disease)     back    Dysphagia, unspecified(217.20)     followed by dr cr    Esophageal stricture     has esophagus stretched every 3-4 months    GERD (gastroesophageal reflux disease)     OMEPRAZOLE DAILY- WELL CONTROLLED     History of squamous cell carcinoma 2008    at base of tongue/throat  surg, chemo and radiation    Hypercholesteremia     Hypothyroid     manaaged with medication    Osteoporosis     Peripheral neuropathy     chemo induced    Personal history of colonic polyps     PUD (peptic ulcer disease)     2015    Throat cancer (HCC) Dx 2009    throat-radiation, chemo    Vitamin D deficiency        PSH:    Past

## 2024-02-16 NOTE — PATIENT INSTRUCTIONS
We reviewed the significance of an elevated PSA.  We discussed benign etiologies such as; benign enlargement of the prostate, inflammation of the prostate, infections of the prostate, and prostatic manipulation as a possible cause. We also discussed the possibility of prostate cancer.  We explained that the main ways to diagnosis prostate cancer are with an ultrasound-guided biopsy or a prostate MRI followed by a biopsy. We explained that the MRI gives a view of the entire prostate and would possibly enable a targeted biopsy if an abnormality was seen on the MRI.  Otherwise, we could just continue with observation and repeat PSA checks.    We will plan for MRI of your prostate and have you return to review results. If your PSA today is back below 4, will plan to cancel the MRI and have you return in 6 months with repeat PSA.

## 2024-02-27 ENCOUNTER — HOSPITAL ENCOUNTER (OUTPATIENT)
Dept: MRI IMAGING | Age: 74
Discharge: HOME | End: 2024-03-01
Payer: MEDICARE

## 2024-02-27 DIAGNOSIS — R97.20 ELEVATED PSA: ICD-10-CM

## 2024-02-27 PROCEDURE — A9579 GAD-BASE MR CONTRAST NOS,1ML: HCPCS | Performed by: PHYSICIAN ASSISTANT

## 2024-02-27 PROCEDURE — 72197 MRI PELVIS W/O & W/DYE: CPT

## 2024-02-27 PROCEDURE — 2580000003 HC RX 258: Performed by: PHYSICIAN ASSISTANT

## 2024-02-27 PROCEDURE — 6360000004 HC RX CONTRAST MEDICATION: Performed by: PHYSICIAN ASSISTANT

## 2024-02-27 RX ORDER — SODIUM CHLORIDE 0.9 % (FLUSH) 0.9 %
10 SYRINGE (ML) INJECTION AS NEEDED
Status: DISCONTINUED | OUTPATIENT
Start: 2024-02-27 | End: 2024-03-02 | Stop reason: HOSPADM

## 2024-02-27 RX ADMIN — GADOTERIDOL 13 ML: 279.3 INJECTION, SOLUTION INTRAVENOUS at 16:36

## 2024-02-27 RX ADMIN — SODIUM CHLORIDE, PRESERVATIVE FREE 10 ML: 5 INJECTION INTRAVENOUS at 16:36

## 2024-03-12 ENCOUNTER — OFFICE VISIT (OUTPATIENT)
Dept: FAMILY MEDICINE CLINIC | Facility: CLINIC | Age: 74
End: 2024-03-12
Payer: MEDICARE

## 2024-03-12 VITALS
SYSTOLIC BLOOD PRESSURE: 120 MMHG | BODY MASS INDEX: 20.59 KG/M2 | OXYGEN SATURATION: 100 % | WEIGHT: 151.8 LBS | HEART RATE: 59 BPM | DIASTOLIC BLOOD PRESSURE: 84 MMHG | TEMPERATURE: 98.2 F

## 2024-03-12 DIAGNOSIS — R97.20 ELEVATED PSA: ICD-10-CM

## 2024-03-12 DIAGNOSIS — M19.90 ARTHRITIS: Primary | ICD-10-CM

## 2024-03-12 PROCEDURE — G8420 CALC BMI NORM PARAMETERS: HCPCS | Performed by: FAMILY MEDICINE

## 2024-03-12 PROCEDURE — 1123F ACP DISCUSS/DSCN MKR DOCD: CPT | Performed by: FAMILY MEDICINE

## 2024-03-12 PROCEDURE — 3017F COLORECTAL CA SCREEN DOC REV: CPT | Performed by: FAMILY MEDICINE

## 2024-03-12 PROCEDURE — G8427 DOCREV CUR MEDS BY ELIG CLIN: HCPCS | Performed by: FAMILY MEDICINE

## 2024-03-12 PROCEDURE — G8484 FLU IMMUNIZE NO ADMIN: HCPCS | Performed by: FAMILY MEDICINE

## 2024-03-12 PROCEDURE — 99213 OFFICE O/P EST LOW 20 MIN: CPT | Performed by: FAMILY MEDICINE

## 2024-03-12 PROCEDURE — 1036F TOBACCO NON-USER: CPT | Performed by: FAMILY MEDICINE

## 2024-03-12 RX ORDER — CELECOXIB 200 MG/1
200 CAPSULE ORAL DAILY PRN
Qty: 90 CAPSULE | Refills: 3 | Status: SHIPPED | OUTPATIENT
Start: 2024-03-12

## 2024-03-15 ENCOUNTER — OFFICE VISIT (OUTPATIENT)
Dept: ONCOLOGY | Age: 74
End: 2024-03-15
Payer: MEDICARE

## 2024-03-15 VITALS
HEART RATE: 66 BPM | SYSTOLIC BLOOD PRESSURE: 106 MMHG | TEMPERATURE: 98.7 F | BODY MASS INDEX: 20.59 KG/M2 | DIASTOLIC BLOOD PRESSURE: 75 MMHG | OXYGEN SATURATION: 100 % | RESPIRATION RATE: 16 BRPM | WEIGHT: 152 LBS | HEIGHT: 72 IN

## 2024-03-15 DIAGNOSIS — R97.20 ELEVATED PSA: Primary | ICD-10-CM

## 2024-03-15 PROCEDURE — 3017F COLORECTAL CA SCREEN DOC REV: CPT | Performed by: PHYSICIAN ASSISTANT

## 2024-03-15 PROCEDURE — G8420 CALC BMI NORM PARAMETERS: HCPCS | Performed by: PHYSICIAN ASSISTANT

## 2024-03-15 PROCEDURE — 1036F TOBACCO NON-USER: CPT | Performed by: PHYSICIAN ASSISTANT

## 2024-03-15 PROCEDURE — G8484 FLU IMMUNIZE NO ADMIN: HCPCS | Performed by: PHYSICIAN ASSISTANT

## 2024-03-15 PROCEDURE — 99213 OFFICE O/P EST LOW 20 MIN: CPT | Performed by: PHYSICIAN ASSISTANT

## 2024-03-15 PROCEDURE — 1123F ACP DISCUSS/DSCN MKR DOCD: CPT | Performed by: PHYSICIAN ASSISTANT

## 2024-03-15 PROCEDURE — G8427 DOCREV CUR MEDS BY ELIG CLIN: HCPCS | Performed by: PHYSICIAN ASSISTANT

## 2024-03-15 NOTE — PROGRESS NOTES
Urologic Oncology  [facility] Hematology & Oncology  [address] [place] [address]  [phone]        Mr. Zachariah Kiran is a 73 y.o. male with a diagnosis of elevated PSA.    INTERVAL HISTORY:    Mr. Kiran returns today to review MRI pelvis with and without contrast for evaluation of elevated PSA.  He is without acute complaints today's visit.    Patient had MRI pelvis with and without contrast on 2/27/2024.  Prostate measured 4.6 x 3.8 x 3.4 cm which equates to approximate volume of 30 cc with a prostate density of 0.14 accounting for PSA of 4.4.  He did demonstrate 1 single PI-RADS 4 lesion measuring 1.1 cm in the right mid transitional zone.  His MARVEL at his last visit was without palpable nodules or induration.    From previous note (2/16/24):  Zachariah Kiran is a 73 y.o. male who is referred for evaluation of elevated PSA.  He had routine screening labs on 12/14/2023 with a PSA of 5.2.  This had previously been 3.6-3.7 over the last several years.     He denies any significant lower urinary tract symptoms.  No dysuria, hematuria.  He empties his bladder and voids with good stream.  He states that most he will get up once at night to void.     Patient reports that his dad was diagnosed in his mid 80s with prostate cancer, treated by prostatectomy and lived into his mid 90s.  He is unsure of his brothers prostate history.       Patient medical history significant for head neck cancer treated with chemoradiation therapy in December 2008 now with no evidence of disease.  Also hypothyroid, high cholesterol, GERD.  He takes a baby aspirin daily but no other blood thinners or anticoagulation.    Past medical, family and social histories, as well as medications and allergies, were reviewed and updated in the medical record as appropriate.    PMH:     Past Medical History:   Diagnosis Date    Arthritis     osteoporosis in joints    Cataract 04/17    Chronic back pain 2000    COVID 11/13/2021    no

## 2024-03-15 NOTE — PATIENT INSTRUCTIONS
We discussed the risks inherent in a biopsy including but not limited to; bleeding, infection, urinary retention, and pain associated with the procedure.  We recommend stopping any aspirin or nonsteroidal anti-inflammatories or other blood thinners approximately 5 days prior to the procedure. If you are taking any of these medications for heart issues or other specific reasons, he needs to consult with the prescribing doctor before stopping treatment.  We explained that we give prophylactic antibiotic(s) around the time of the procedure to help prevent serious infectious complications (which have been on the rise).  We also discussed concerns with over-treatment of prostate cancer and that the main purpose of the biopsy is to hopefully rule out high-grade prostate cancer. After a full discussion regarding the potential risks, benefits, and treatment alternatives, the patient has decided to proceed with an MRI guided prostate biopsy via a transperineal approach.      We will be calling in the next 1-2 weeks to get this scheduled.

## 2024-03-16 ASSESSMENT — ENCOUNTER SYMPTOMS
SORE THROAT: 0
PHOTOPHOBIA: 0
EYE PAIN: 0
BLOOD IN STOOL: 0
SHORTNESS OF BREATH: 0
COLOR CHANGE: 0
ABDOMINAL PAIN: 0
NAUSEA: 0
ABDOMINAL DISTENTION: 0
COUGH: 0

## 2024-03-16 NOTE — PROGRESS NOTES
Zachariah Kiran  1950 is a 73 y.o. male ,Established patient, here for evaluation of the following chief complaint(s):   Chief Complaint   Patient presents with    Follow-up     2 month-     Medication Refill          1. Arthritis  -     celecoxib (CELEBREX) 200 MG capsule; Take 1 capsule by mouth daily as needed for Pain, Disp-90 capsule, R-3Normal    ELEVATED PSA---he is to see urologic oncology    No follow-ups on file.        Subjective   SUBJECTIVE/OBJECTIVE:  Medication Refill  This is a chronic problem. The current episode started more than 1 year ago. The problem has been unchanged. Pertinent negatives include no abdominal pain, chest pain, chills, coughing, fatigue, fever, headaches, joint swelling, myalgias, nausea, rash, sore throat or weakness.       Review of Systems   Constitutional:  Negative for chills, fatigue and fever.   HENT:  Negative for ear pain, hearing loss and sore throat.    Eyes:  Negative for photophobia and pain.   Respiratory:  Negative for cough and shortness of breath.    Cardiovascular:  Negative for chest pain, palpitations and leg swelling.   Gastrointestinal:  Negative for abdominal distention, abdominal pain, blood in stool and nausea.   Genitourinary:  Negative for dysuria and urgency.   Musculoskeletal:  Negative for joint swelling and myalgias.   Skin:  Negative for color change, pallor and rash.   Neurological:  Negative for speech difficulty, weakness, light-headedness and headaches.   Hematological:  Negative for adenopathy.   Psychiatric/Behavioral:  Negative for agitation, confusion, hallucinations, self-injury and suicidal ideas.           Objective   Physical Exam              An electronic signature was used to authenticate this note.    --Hugh Vance MD

## 2024-03-18 RX ORDER — DULOXETIN HYDROCHLORIDE 60 MG/1
CAPSULE, DELAYED RELEASE ORAL
Qty: 90 CAPSULE | Refills: 3 | Status: SHIPPED | OUTPATIENT
Start: 2024-03-18

## 2024-03-18 RX ORDER — ATORVASTATIN CALCIUM 10 MG/1
TABLET, FILM COATED ORAL
Qty: 90 TABLET | Refills: 3 | Status: SHIPPED | OUTPATIENT
Start: 2024-03-18

## 2024-03-29 ENCOUNTER — PREP FOR PROCEDURE (OUTPATIENT)
Dept: ONCOLOGY | Age: 74
End: 2024-03-29

## 2024-03-29 DIAGNOSIS — R97.20 ELEVATED PROSTATE SPECIFIC ANTIGEN (PSA): ICD-10-CM

## 2024-04-09 ENCOUNTER — ANESTHESIA EVENT (OUTPATIENT)
Dept: SURGERY | Age: 74
End: 2024-04-09
Payer: MEDICARE

## 2024-04-09 ENCOUNTER — ANESTHESIA (OUTPATIENT)
Dept: SURGERY | Age: 74
End: 2024-04-09
Payer: MEDICARE

## 2024-04-09 ENCOUNTER — HOSPITAL ENCOUNTER (OUTPATIENT)
Age: 74
Setting detail: OUTPATIENT SURGERY
Discharge: HOME | End: 2024-04-09
Attending: UROLOGY | Admitting: UROLOGY
Payer: MEDICARE

## 2024-04-09 VITALS
HEIGHT: 71 IN | DIASTOLIC BLOOD PRESSURE: 88 MMHG | OXYGEN SATURATION: 100 % | HEART RATE: 68 BPM | RESPIRATION RATE: 16 BRPM | TEMPERATURE: 97.8 F | BODY MASS INDEX: 20.3 KG/M2 | WEIGHT: 145 LBS | SYSTOLIC BLOOD PRESSURE: 148 MMHG

## 2024-04-09 PROCEDURE — 2580000003 HC RX 258: Performed by: ANESTHESIOLOGY

## 2024-04-09 PROCEDURE — 7100000011 HC PHASE II RECOVERY - ADDTL 15 MIN: Performed by: UROLOGY

## 2024-04-09 PROCEDURE — 3700000000 HC ANESTHESIA ATTENDED CARE: Performed by: UROLOGY

## 2024-04-09 PROCEDURE — 7100000001 HC PACU RECOVERY - ADDTL 15 MIN: Performed by: UROLOGY

## 2024-04-09 PROCEDURE — 7100000010 HC PHASE II RECOVERY - FIRST 15 MIN: Performed by: UROLOGY

## 2024-04-09 PROCEDURE — 7100000000 HC PACU RECOVERY - FIRST 15 MIN: Performed by: UROLOGY

## 2024-04-09 PROCEDURE — 88305 TISSUE EXAM BY PATHOLOGIST: CPT

## 2024-04-09 PROCEDURE — 6360000002 HC RX W HCPCS: Performed by: PHYSICIAN ASSISTANT

## 2024-04-09 PROCEDURE — 3600000003 HC SURGERY LEVEL 3 BASE: Performed by: UROLOGY

## 2024-04-09 PROCEDURE — 2709999900 HC NON-CHARGEABLE SUPPLY: Performed by: UROLOGY

## 2024-04-09 PROCEDURE — 3600000013 HC SURGERY LEVEL 3 ADDTL 15MIN: Performed by: UROLOGY

## 2024-04-09 PROCEDURE — 6360000002 HC RX W HCPCS: Performed by: NURSE ANESTHETIST, CERTIFIED REGISTERED

## 2024-04-09 PROCEDURE — 2500000003 HC RX 250 WO HCPCS: Performed by: NURSE ANESTHETIST, CERTIFIED REGISTERED

## 2024-04-09 PROCEDURE — 76872 US TRANSRECTAL: CPT | Performed by: UROLOGY

## 2024-04-09 PROCEDURE — 2500000003 HC RX 250 WO HCPCS: Performed by: UROLOGY

## 2024-04-09 PROCEDURE — 55700 PR PROSTATE NEEDLE BIOPSY ANY APPROACH: CPT | Performed by: UROLOGY

## 2024-04-09 PROCEDURE — 6360000002 HC RX W HCPCS: Performed by: UROLOGY

## 2024-04-09 PROCEDURE — 3700000001 HC ADD 15 MINUTES (ANESTHESIA): Performed by: UROLOGY

## 2024-04-09 RX ORDER — FENTANYL CITRATE 50 UG/ML
100 INJECTION, SOLUTION INTRAMUSCULAR; INTRAVENOUS
Status: DISCONTINUED | OUTPATIENT
Start: 2024-04-09 | End: 2024-04-09 | Stop reason: HOSPADM

## 2024-04-09 RX ORDER — LIDOCAINE HYDROCHLORIDE 20 MG/ML
INJECTION, SOLUTION EPIDURAL; INFILTRATION; INTRACAUDAL; PERINEURAL PRN
Status: DISCONTINUED | OUTPATIENT
Start: 2024-04-09 | End: 2024-04-09 | Stop reason: SDUPTHER

## 2024-04-09 RX ORDER — FENTANYL CITRATE 50 UG/ML
25 INJECTION, SOLUTION INTRAMUSCULAR; INTRAVENOUS
Status: DISCONTINUED | OUTPATIENT
Start: 2024-04-09 | End: 2024-04-09 | Stop reason: HOSPADM

## 2024-04-09 RX ORDER — OXYCODONE HYDROCHLORIDE 5 MG/1
5 TABLET ORAL PRN
Status: DISCONTINUED | OUTPATIENT
Start: 2024-04-09 | End: 2024-04-09 | Stop reason: HOSPADM

## 2024-04-09 RX ORDER — SODIUM CHLORIDE 9 MG/ML
INJECTION, SOLUTION INTRAVENOUS PRN
Status: DISCONTINUED | OUTPATIENT
Start: 2024-04-09 | End: 2024-04-09 | Stop reason: HOSPADM

## 2024-04-09 RX ORDER — SODIUM CHLORIDE 0.9 % (FLUSH) 0.9 %
5-40 SYRINGE (ML) INJECTION EVERY 12 HOURS SCHEDULED
Status: DISCONTINUED | OUTPATIENT
Start: 2024-04-09 | End: 2024-04-09 | Stop reason: HOSPADM

## 2024-04-09 RX ORDER — SODIUM CHLORIDE, SODIUM LACTATE, POTASSIUM CHLORIDE, CALCIUM CHLORIDE 600; 310; 30; 20 MG/100ML; MG/100ML; MG/100ML; MG/100ML
INJECTION, SOLUTION INTRAVENOUS CONTINUOUS
Status: DISCONTINUED | OUTPATIENT
Start: 2024-04-09 | End: 2024-04-09 | Stop reason: HOSPADM

## 2024-04-09 RX ORDER — BUPIVACAINE HYDROCHLORIDE 2.5 MG/ML
INJECTION, SOLUTION EPIDURAL; INFILTRATION; INTRACAUDAL PRN
Status: DISCONTINUED | OUTPATIENT
Start: 2024-04-09 | End: 2024-04-09 | Stop reason: HOSPADM

## 2024-04-09 RX ORDER — MIDAZOLAM HYDROCHLORIDE 2 MG/2ML
2 INJECTION, SOLUTION INTRAMUSCULAR; INTRAVENOUS
Status: DISCONTINUED | OUTPATIENT
Start: 2024-04-09 | End: 2024-04-09 | Stop reason: HOSPADM

## 2024-04-09 RX ORDER — HYDROMORPHONE HYDROCHLORIDE 2 MG/ML
0.25 INJECTION, SOLUTION INTRAMUSCULAR; INTRAVENOUS; SUBCUTANEOUS EVERY 5 MIN PRN
Status: DISCONTINUED | OUTPATIENT
Start: 2024-04-09 | End: 2024-04-09 | Stop reason: HOSPADM

## 2024-04-09 RX ORDER — SODIUM CHLORIDE 0.9 % (FLUSH) 0.9 %
5-40 SYRINGE (ML) INJECTION PRN
Status: DISCONTINUED | OUTPATIENT
Start: 2024-04-09 | End: 2024-04-09 | Stop reason: HOSPADM

## 2024-04-09 RX ORDER — ONDANSETRON 2 MG/ML
4 INJECTION INTRAMUSCULAR; INTRAVENOUS
Status: DISCONTINUED | OUTPATIENT
Start: 2024-04-09 | End: 2024-04-09 | Stop reason: HOSPADM

## 2024-04-09 RX ORDER — HYDROMORPHONE HYDROCHLORIDE 2 MG/ML
0.5 INJECTION, SOLUTION INTRAMUSCULAR; INTRAVENOUS; SUBCUTANEOUS EVERY 5 MIN PRN
Status: DISCONTINUED | OUTPATIENT
Start: 2024-04-09 | End: 2024-04-09 | Stop reason: HOSPADM

## 2024-04-09 RX ORDER — NALOXONE HYDROCHLORIDE 0.4 MG/ML
INJECTION, SOLUTION INTRAMUSCULAR; INTRAVENOUS; SUBCUTANEOUS PRN
Status: DISCONTINUED | OUTPATIENT
Start: 2024-04-09 | End: 2024-04-09 | Stop reason: HOSPADM

## 2024-04-09 RX ORDER — DIPHENHYDRAMINE HYDROCHLORIDE 50 MG/ML
6.25 INJECTION INTRAMUSCULAR; INTRAVENOUS
Status: DISCONTINUED | OUTPATIENT
Start: 2024-04-09 | End: 2024-04-09 | Stop reason: HOSPADM

## 2024-04-09 RX ORDER — LIDOCAINE HYDROCHLORIDE 10 MG/ML
1 INJECTION, SOLUTION INFILTRATION; PERINEURAL
Status: DISCONTINUED | OUTPATIENT
Start: 2024-04-09 | End: 2024-04-09 | Stop reason: HOSPADM

## 2024-04-09 RX ORDER — SODIUM CHLORIDE 9 MG/ML
INJECTION, SOLUTION INTRAVENOUS CONTINUOUS
Status: DISCONTINUED | OUTPATIENT
Start: 2024-04-09 | End: 2024-04-09 | Stop reason: HOSPADM

## 2024-04-09 RX ORDER — LIDOCAINE HYDROCHLORIDE 10 MG/ML
INJECTION, SOLUTION INFILTRATION; PERINEURAL PRN
Status: DISCONTINUED | OUTPATIENT
Start: 2024-04-09 | End: 2024-04-09 | Stop reason: HOSPADM

## 2024-04-09 RX ORDER — PROPOFOL 10 MG/ML
INJECTION, EMULSION INTRAVENOUS PRN
Status: DISCONTINUED | OUTPATIENT
Start: 2024-04-09 | End: 2024-04-09 | Stop reason: SDUPTHER

## 2024-04-09 RX ORDER — OXYCODONE HYDROCHLORIDE 5 MG/1
10 TABLET ORAL PRN
Status: DISCONTINUED | OUTPATIENT
Start: 2024-04-09 | End: 2024-04-09 | Stop reason: HOSPADM

## 2024-04-09 RX ADMIN — SODIUM CHLORIDE, POTASSIUM CHLORIDE, SODIUM LACTATE AND CALCIUM CHLORIDE: 600; 310; 30; 20 INJECTION, SOLUTION INTRAVENOUS at 08:42

## 2024-04-09 RX ADMIN — LIDOCAINE HYDROCHLORIDE 100 MG: 20 INJECTION, SOLUTION EPIDURAL; INFILTRATION; INTRACAUDAL; PERINEURAL at 10:03

## 2024-04-09 RX ADMIN — PROPOFOL 50 MG: 10 INJECTION, EMULSION INTRAVENOUS at 10:03

## 2024-04-09 RX ADMIN — PROPOFOL 50 MG: 10 INJECTION, EMULSION INTRAVENOUS at 10:07

## 2024-04-09 RX ADMIN — PROPOFOL 120 MCG/KG/MIN: 10 INJECTION, EMULSION INTRAVENOUS at 10:04

## 2024-04-09 RX ADMIN — Medication 2000 MG: at 10:04

## 2024-04-09 RX ADMIN — PROPOFOL 30 MG: 10 INJECTION, EMULSION INTRAVENOUS at 10:11

## 2024-04-09 ASSESSMENT — PAIN - FUNCTIONAL ASSESSMENT: PAIN_FUNCTIONAL_ASSESSMENT: 0-10

## 2024-04-09 ASSESSMENT — PAIN SCALES - GENERAL: PAINLEVEL_OUTOF10: 0

## 2024-04-09 ASSESSMENT — LIFESTYLE VARIABLES: SMOKING_STATUS: 1

## 2024-04-09 NOTE — ANESTHESIA POSTPROCEDURE EVALUATION
Department of Anesthesiology  Postprocedure Note    Patient: Zachariah Kiran  MRN: [id number]  YOB: 1950  Date of evaluation: 4/9/2024    Procedure Summary       Date: 04/09/24 Room / Location: [facility] MAIN OR 03 / [facility] MAIN OR    Anesthesia Start: 0958 Anesthesia Stop: 1021    Procedure: PROSTATE BIOPSY TRANSPERINEAL (Perineum) Diagnosis:       Elevated prostate specific antigen (PSA)      (Elevated prostate specific antigen (PSA) [R97.20])    Providers: Juan Mazariegos MD Responsible Provider: Jeffery Whitt MD    Anesthesia Type: MAC ASA Status: 3            Anesthesia Type: MAC    Sharath Phase I: Sharath Score: 7    Sharath Phase II: Sharath Score: 10    Anesthesia Post Evaluation    Patient location during evaluation: PACU  Patient participation: complete - patient participated  Level of consciousness: awake  Airway patency: patent  Nausea & Vomiting: no nausea  Cardiovascular status: blood pressure returned to baseline and hemodynamically stable  Respiratory status: acceptable  Hydration status: stable  Multimodal analgesia pain management approach  Pain management: adequate    No notable events documented.

## 2024-04-09 NOTE — ANESTHESIA PRE PROCEDURE
Department of Anesthesiology  Preprocedure Note       Name:  Zachariah Kiran   Age:  73 y.o.  :  1950                                          MRN:  [id number]         Date:  2024      Surgeon: Surgeon(s):  Juan Mazariegos MD    Procedure: Procedure(s):  PROSTATE BIOPSY TRANSPERINEAL    Medications prior to admission:   Prior to Admission medications    Medication Sig Start Date End Date Taking? Authorizing Provider   DULoxetine (CYMBALTA) 60 MG extended release capsule TAKE 1 CAPSULE EVERY DAY 3/18/24   Hugh Vance MD   atorvastatin (LIPITOR) 10 MG tablet TAKE 1 TABLET EVERY NIGHT FOR HYPERCHOLESTEROLEMIA 3/18/24   Hugh Vance MD   celecoxib (CELEBREX) 200 MG capsule Take 1 capsule by mouth daily as needed for Pain 3/12/24   Hugh Vance MD   vitamin C (ASCORBIC ACID) 500 MG tablet Take 1 tablet by mouth every other day    Kiara Kimbrough MD   Omega-3 Fatty Acids (FISH OIL) 300 MG CAPS Take by mouth    Kiara Kimbrough MD   omeprazole (PRILOSEC) 40 MG delayed release capsule TAKE 1 CAPSULE EVERY MORNING 24   Hugh Vance MD   levothyroxine (SYNTHROID) 50 MCG tablet TAKE 1 TABLET EVERY DAY 23   Hugh Vance MD   calcium carb-cholecalciferol (CALCIUM 600+D) 600-10 MG-MCG TABS per tab Take 1 tablet by mouth daily    Kiara Kimbrough MD   vitamin D 25 MCG (1000 UT) CAPS Take 1 capsule by mouth every other day    Kiara Kimbrough MD   Multiple Vitamin (MULTI-VITAMIN DAILY PO) Take 0.5 tablets by mouth daily    Kiara Kimbrough MD   aspirin 81 MG EC tablet Take 1 tablet by mouth every evening    Automatic Reconciliation, Ar   cyclobenzaprine (FLEXERIL) 5 MG tablet Take 1 tablet by mouth 2 times daily as needed 18   Automatic Reconciliation, Ar   ferrous sulfate (IRON 325) 325 (65 Fe) MG tablet Take by mouth every evening    Automatic Reconciliation, Ar   Lutein 6 MG TABS Take 20 mg by mouth daily

## 2024-04-09 NOTE — OP NOTE
Patient: Zachariah Kiran, [id number]    Date of Surgery: 04/09/24    Preoperative Diagnosis: Elevated PSA    Postoperative Diagnosis:  same    Surgeon(s) and Role:     * Jarrell Mazariegos MD - Primary     Anesthesia:  MAC     Procedure: Procedure(s):  URONAV MRI Fusion Transperineal Ultrasound-Guided Prostate Biopsy     Indications:     Discussed the risk of surgery including infection, hematoma, bleeding, urinary retention, pain, and the risks of anesthethesia.  The patient understands the risks, any and all questions were answered to the patient's satisfaction and signed the consent for operation.     URONAV MRI FUSED TRANSPERINEAL ULTRASOUND GUIDED BIOPSY OF THE PROSTATE    All risks, benefits and alternatives were again reviewed and he is willing to proceed at this time.  The patient was placed in low lithotomy.   Ancef was given as a prophylactic antibiotic.  Chloroprep was used to prep the perineal area.  Approximately 4-5 cc of 0.25% marcaine mixed equally with 1% lidocaine plain was injected under the perineal skin at the needle insertion sites. I then inserted the transrectal ultrasound probe into the rectum.  The prostate was visualized.  The prostate appeared homogenous in appearance.   Ultrasonographic sweep of the prostate was performed to obtain images to link to MRI via URONAV.  There was one region of interest (right TZ) based upon MRI imaging.  3 biopsies of regions of interest were then obtained using the ultrasound images for guidance that had been linked to the previous MRI using Uronav.  I then performed 10 needle core biopsies using a standard transperineal biopsy format with traditional ultrasound images for guidance.  The ultrasound probe was removed.  The patient tolerated the procedure well.      PROSTATE VOLUME:  23 cc    MARVEL: No induration or nodule     Estimated Blood Loss:  minimal    Specimens: prostate biopsies  ID Type Source Tests Collected by Time Destination   A : RPM Tissue

## 2024-04-09 NOTE — H&P
H&P Update:    The patient's last History and Physical was reviewed, and I examined the patient today.  There are no changes.  The surgical procedure and site were confirmed by the patient and me.    PE:  NAD  Heart- Reg, normal perfusion  Pulmonary- clear, normal respiratory effort  Abdomen- Soft, ND     Plan: The risks, benefits, and alternative treatment options were again discussed with the patient.  The patient understands and wants to proceed with the procedure-- transperineal mri fusion prostate biopsy.     Urologic Oncology  [facility] Hematology & Oncology  [address] [place], [place] [id number] [phone]        Mr. Zachariah Kiran is a 73 y.o. male with a diagnosis of elevated PSA.    INTERVAL HISTORY:    Mr. Kiran returns today to review MRI pelvis with and without contrast for evaluation of elevated PSA.  He is without acute complaints today's visit.    Patient had MRI pelvis with and without contrast on 2/27/2024.  Prostate measured 4.6 x 3.8 x 3.4 cm which equates to approximate volume of 30 cc with a prostate density of 0.14 accounting for PSA of 4.4.  He did demonstrate 1 single PI-RADS 4 lesion measuring 1.1 cm in the right mid transitional zone.  His MARVEL at his last visit was without palpable nodules or induration.    From previous note (2/16/24):  Zachariah Kiran is a 73 y.o. male who is referred for evaluation of elevated PSA.  He had routine screening labs on 12/14/2023 with a PSA of 5.2.  This had previously been 3.6-3.7 over the last several years.     He denies any significant lower urinary tract symptoms.  No dysuria, hematuria.  He empties his bladder and voids with good stream.  He states that most he will get up once at night to void.     Patient reports that his dad was diagnosed in his mid 80s with prostate cancer, treated by prostatectomy and lived into his mid 90s.  He is unsure of his brothers prostate history.       Patient medical history significant for head neck

## 2024-04-09 NOTE — DISCHARGE INSTRUCTIONS
My office will schedule your follow-up appointment.    Please call our office ([phone]) or return to ED if you experience fever > 101.5, severe pain, persistent nausea/vomiting, excessive bleeding, inability to urinate, or other concerns.     If you have had surgery in the past 7-10 days by one of our providers and are having fever, bleeding, or drainage from an incision, have an opening in an incision, or having issues urinating properly, please call [phone].    Prostate Biopsy and Ultrasound:   A prostate biopsy is a type of test. Your doctor takes small tissue samples from your prostate gland. Then another doctor looks at the tissue under a microscope to see if there are cancer cells.  This test is done by a doctor who specializes in men's genital and urinary problems (urologist). It can be done in your doctor's office, a day surgery clinic, or a hospital operating room. To get the tissue samples from the prostate, the doctor inserts a thin needle through the rectum, the urethra, or the area between the anus and scrotum (perineum). The most common method is through the rectum. Your doctor may use ultrasound to help guide the needle.  What else should you know about this test?  A prostate biopsy has a slight risk of causing problems such as infection or bleeding.  If the biopsy went through your rectum, you may have a small amount of bleeding from your rectum for 2 to 3 days after the biopsy.  You may have a little pain in your pelvic area. You may also have a little blood in your urine for 1 to 5 days.  You may have some blood in your semen for a week or longer.  Do not do heavy work or exercise for 4 hours after the test.  Your doctor will tell you how long it may take to get your results back.  Follow-up care is a key part of your treatment and safety. Be sure to make and go to all appointments, and call your doctor if you are having problems. It's also a good idea to keep a list of the medicines you 
all other ROS negative except as per HPI

## 2024-04-10 ENCOUNTER — TELEPHONE (OUTPATIENT)
Dept: ONCOLOGY | Age: 74
End: 2024-04-10

## 2024-04-10 NOTE — TELEPHONE ENCOUNTER
Called and LVM for patient. Informed Dr. Mazariegos's office calling to check in after procedure yesterday. Left follow up information. Advised to call office (number left) or send message with questions or concerns prior to follow up.

## 2024-04-19 ENCOUNTER — OFFICE VISIT (OUTPATIENT)
Dept: ONCOLOGY | Age: 74
End: 2024-04-19

## 2024-04-19 VITALS
DIASTOLIC BLOOD PRESSURE: 60 MMHG | BODY MASS INDEX: 20.25 KG/M2 | WEIGHT: 149.5 LBS | SYSTOLIC BLOOD PRESSURE: 91 MMHG | RESPIRATION RATE: 18 BRPM | OXYGEN SATURATION: 96 % | TEMPERATURE: 97.7 F | HEART RATE: 74 BPM | HEIGHT: 72 IN

## 2024-04-19 DIAGNOSIS — C61 PROSTATE CANCER (HCC): Primary | ICD-10-CM

## 2024-04-19 PROCEDURE — 1036F TOBACCO NON-USER: CPT | Performed by: UROLOGY

## 2024-04-19 PROCEDURE — 3017F COLORECTAL CA SCREEN DOC REV: CPT | Performed by: UROLOGY

## 2024-04-19 ASSESSMENT — PATIENT HEALTH QUESTIONNAIRE - PHQ9
SUM OF ALL RESPONSES TO PHQ9 QUESTIONS 1 & 2: 0
2. FEELING DOWN, DEPRESSED OR HOPELESS: NOT AT ALL
SUM OF ALL RESPONSES TO PHQ QUESTIONS 1-9: 0
1. LITTLE INTEREST OR PLEASURE IN DOING THINGS: NOT AT ALL
SUM OF ALL RESPONSES TO PHQ QUESTIONS 1-9: 0

## 2024-04-19 NOTE — PROGRESS NOTES
Urologic Oncology  [facility] Hematology & Oncology  [address] [place] [address]  [phone]        Mr. Zachariah Kiran is a 73 y.o. male with a diagnosis of 3+4=7 prostate cancer diagnosed on biopsy 4/9/2024.  MARVEL at time of biopsy negative for palpable nodules or induration.  PSA 4.4 on 2/16/2024.  cT1c    INTERVAL HISTORY:    Mr. Krian returns today for follow-up and review of pathology following his MRI fusion guided transperineal prostate biopsy on 4/9/2024.  He states he tolerated biopsy well overall.  He continues to have mild intermittent hematuria that is largely improved.    Prostate biopsy demonstrated GS 3+4=7 prostate cancer involving 30% of the VERDE core, 3+3 involving approximately 10% of the LPL core and 1 core with ASAP.  He had 2 positive cores out of 13.    From previous note (3/15/24):  Mr. Kiran returns today to review MRI pelvis with and without contrast for evaluation of elevated PSA.  He is without acute complaints today's visit.     Patient had MRI pelvis with and without contrast on 2/27/2024.  Prostate measured 4.6 x 3.8 x 3.4 cm which equates to approximate volume of 30 cc with a prostate density of 0.14 accounting for PSA of 4.4.  He did demonstrate 1 single PI-RADS 4 lesion measuring 1.1 cm in the right mid transitional zone.  His MARVEL at his last visit was without palpable nodules or induration.    Past medical, family and social histories, as well as medications and allergies, were reviewed and updated in the medical record as appropriate.    PMH:     Past Medical History:   Diagnosis Date    Arthritis     osteoporosis in joints    Cataract 04/17    Chronic back pain 2000    COVID 11/13/2021    no hospitalization    DJD (degenerative joint disease)     back    Dysphagia, unspecified(787.09)     followed by dr cr    Esophageal stricture     has esophagus stretched every 3-4 months    GERD (gastroesophageal reflux disease)     OMEPRAZOLE DAILY- WELL CONTROLLED

## 2024-04-26 NOTE — PROGRESS NOTES
[facility]    [facility] educator met with the patient and discussed the discharge plan for home IVABX. Shahida Ortega will discharge home with family support. The patient will infuse her medication via Elastomeric Pump. The patient was educated on the S.A.S.H procedure and a S.A.S.H mat was provided. The patient education checklist was reviewed and acknowledged by the above person and she is agreeable to discharge with the home infusion plan of care. The IV administration process using aspetic technique was reviewed with successful return demonstration. The patient feels comfortable with doing her own home infusions. The patient will discharge home with Ceftriaxone 2 gm IV every 24 hours for an estimated end of therapy date of 5/29/24. Dosing schedule times are 1900 daily. I requested that her dose for today be given prior to discharge. Extensions were placed to her double lumen PICC line. We are still pending home health for weekly dressing changes and lab draws. Time was allotted for questions. The patient's nurse and the case management team were notified that the teaching has been completed. Fall precautions were reviewed.    Medication delivery will be made to the home.    [facility]  Clarissa Kumar RN, Clinical Educator  Cell [phone]  Office [phone]  Fax [phone]         Patient denies any needs. Vital signs stable. Discharge instructions given to patient and . No other concerns noted at this time. Patient wheeled out to car via wheelchair with staff.

## 2024-10-10 ENCOUNTER — PREP FOR PROCEDURE (OUTPATIENT)
Dept: ONCOLOGY | Age: 74
End: 2024-10-10

## 2024-10-10 DIAGNOSIS — C61 PROSTATE CANCER (HCC): Primary | ICD-10-CM

## 2024-10-10 NOTE — PROGRESS NOTES
Urologic Oncology  [facility]emat[facility] & Oncology  [address] [place] [id number]  [phone]        Mr. Zachariah Kiran is a 74 y.o. male with a diagnosis of 3+4=7 prostate cancer diagnosed on biopsy 4/9/2024. PSA 4.4 on 2/16/2024.  cT1c . On AS.    INTERVAL HISTORY:    Patient returns today for follow-up evaluation of his GS 3+4=7, NCCN favorable intermediate risk prostate cancer diagnosed on biopsy 4/9/2024.  He states been doing well overall from a prostate cancer standpoint since his last visit.  He denies any significant lower urinary tract symptoms.  No recent dysuria or hematuria.  He is emptying his bladder and voids with good stream.  He denies significant nocturia.    Patient states that he has had dental evaluation and recommendation for complete extraction following his radiation therapy.  He has been on Reclast with his last injection September 2023.  His dentist recommended skipping this years Reclast injection in anticipation of dental extractions.  This has been moved to May 2025.  He also has been told that he needs to pursue hyperbaric oxygen treatment prior to consideration of dental extractions given radiation for prior head neck cancer.  He continues with this process is currently awaiting insurance approval.    From previous note (4/19/24):  Mr. Kiran returns today for follow-up and review of pathology following his MRI fusion guided transperineal prostate biopsy on 4/9/2024.  He states he tolerated biopsy well overall.  He continues to have mild intermittent hematuria that is largely improved.     Prostate biopsy demonstrated GS 3+4=7 prostate cancer involving 30% of the VERDE core, 3+3 involving approximately 10% of the LPL core and 1 core with ASAP.  He had 2 positive cores out of 13.    Past medical, family and social histories, as well as medications and allergies, were reviewed and updated in the medical record as appropriate.    PMH:     Past Medical History:

## 2024-10-17 ENCOUNTER — OFFICE VISIT (OUTPATIENT)
Dept: ONCOLOGY | Age: 74
End: 2024-10-17
Payer: MEDICARE

## 2024-10-17 ENCOUNTER — HOSPITAL ENCOUNTER (OUTPATIENT)
Dept: LAB | Age: 74
Discharge: HOME | End: 2024-10-17
Payer: MEDICARE

## 2024-10-17 VITALS
WEIGHT: 147 LBS | DIASTOLIC BLOOD PRESSURE: 70 MMHG | BODY MASS INDEX: 19.91 KG/M2 | OXYGEN SATURATION: 98 % | HEIGHT: 72 IN | RESPIRATION RATE: 18 BRPM | TEMPERATURE: 97.7 F | SYSTOLIC BLOOD PRESSURE: 109 MMHG | HEART RATE: 76 BPM

## 2024-10-17 DIAGNOSIS — C61 PROSTATE CANCER (HCC): Primary | ICD-10-CM

## 2024-10-17 DIAGNOSIS — C61 PROSTATE CANCER (HCC): ICD-10-CM

## 2024-10-17 LAB — PSA SERPL-MCNC: 4.9 NG/ML (ref 0–4)

## 2024-10-17 PROCEDURE — 99213 OFFICE O/P EST LOW 20 MIN: CPT | Performed by: PHYSICIAN ASSISTANT

## 2024-10-17 PROCEDURE — G8420 CALC BMI NORM PARAMETERS: HCPCS | Performed by: PHYSICIAN ASSISTANT

## 2024-10-17 PROCEDURE — 84153 ASSAY OF PSA TOTAL: CPT

## 2024-10-17 PROCEDURE — G8427 DOCREV CUR MEDS BY ELIG CLIN: HCPCS | Performed by: PHYSICIAN ASSISTANT

## 2024-10-17 PROCEDURE — 3017F COLORECTAL CA SCREEN DOC REV: CPT | Performed by: PHYSICIAN ASSISTANT

## 2024-10-17 PROCEDURE — G8484 FLU IMMUNIZE NO ADMIN: HCPCS | Performed by: PHYSICIAN ASSISTANT

## 2024-10-17 PROCEDURE — 1123F ACP DISCUSS/DSCN MKR DOCD: CPT | Performed by: PHYSICIAN ASSISTANT

## 2024-10-17 PROCEDURE — 36415 COLL VENOUS BLD VENIPUNCTURE: CPT

## 2024-10-17 PROCEDURE — 1036F TOBACCO NON-USER: CPT | Performed by: PHYSICIAN ASSISTANT

## 2024-10-17 NOTE — PATIENT INSTRUCTIONS
Patient Information from Today's Visit    The members of your [facility]cology Medical Home are listed below:    Physician Provider: Juan Mazariegos, Urologic Oncologist  Advanced Practice Clinician: JAMEL Moses  Nurse Navigator: N/A  Medical Assistant: Saloni FERRARA CMA  :Pennie MARI  Supportive Care Services: Lottie ALVARES LMSW    Diagnosis: Prostate    Follow Up Instructions:   Your labs from today are still pending.  We will plan to see you back in 6 months if everything is stable.  In 1 year we may make a plan to repeat your prostate MRI.  Please make arrangements to get your labs drawn at one of our outreach buildings.  If you need anything prior to your next appointment please do not hesitate to call our office.    Treatment Summary has been discussed and given to patient:N/A      Current Labs:   No visits with results within 3 Day(s) from this visit.   Latest known visit with results is:   Hospital Outpatient Visit on 02/16/2024   Component Date Value Ref Range Status    PSA 02/16/2024 4.4 (H)  <4.0 ng/mL Final    Comment: [employer] [place] methodology.  New method in use, please reestablish patient baseline  [employer] [place] DAXKO technology. Patient's results of tumor marker testing may not be comparable to labs using different manufacturers/methods.      PSA, Free 02/16/2024 0.4  ng/mL Final    Comment: [employer] [place] methodology.  [employer] [place] DAXKO technology. Patient's results of tumor marker testing may not be comparable to labs using different manufacturers/methods.      PSA, Free Pct 02/16/2024 9.1  % Final    Comment: (NOTE)  10   The table below lists the probability of prostate cancer for men   with  20   non-suspicious MARVEL resuls and total PSA between 1 and 10 ng/mL,   by  30   patient age (Aline et al, JA<A 1998, 279:1542)  40   .Provide an individual patient risk assessment of prostate   cancer,  50         (Catalona et al, GUDELIA 1998, 279:6001-8575)  60  70   % Free PSA         50-64 yr

## 2024-11-25 ENCOUNTER — APPOINTMENT (OUTPATIENT)
Dept: CT IMAGING | Age: 74
End: 2024-11-25
Payer: MEDICARE

## 2024-11-25 ENCOUNTER — APPOINTMENT (OUTPATIENT)
Dept: GENERAL RADIOLOGY | Age: 74
End: 2024-11-25
Payer: MEDICARE

## 2024-11-25 ENCOUNTER — HOSPITAL ENCOUNTER (EMERGENCY)
Age: 74
Discharge: HOME | End: 2024-11-25
Attending: EMERGENCY MEDICINE
Payer: MEDICARE

## 2024-11-25 VITALS
RESPIRATION RATE: 16 BRPM | HEART RATE: 79 BPM | BODY MASS INDEX: 20.59 KG/M2 | TEMPERATURE: 97.6 F | HEIGHT: 72 IN | OXYGEN SATURATION: 99 % | WEIGHT: 152 LBS | SYSTOLIC BLOOD PRESSURE: 126 MMHG | DIASTOLIC BLOOD PRESSURE: 88 MMHG

## 2024-11-25 DIAGNOSIS — S16.1XXA CERVICAL STRAIN, ACUTE, INITIAL ENCOUNTER: ICD-10-CM

## 2024-11-25 DIAGNOSIS — I95.1 ORTHOSTATIC SYNCOPE: Primary | ICD-10-CM

## 2024-11-25 DIAGNOSIS — S01.01XA LACERATION OF SCALP, INITIAL ENCOUNTER: ICD-10-CM

## 2024-11-25 DIAGNOSIS — S30.0XXA LUMBAR CONTUSION, INITIAL ENCOUNTER: ICD-10-CM

## 2024-11-25 LAB
ALBUMIN SERPL-MCNC: 4.8 G/DL (ref 3.2–4.6)
ALBUMIN/GLOB SERPL: 1.5 (ref 1–1.9)
ALP SERPL-CCNC: 87 U/L (ref 40–129)
ALT SERPL-CCNC: 25 U/L (ref 8–55)
ANION GAP SERPL CALC-SCNC: 15 MMOL/L (ref 7–16)
AST SERPL-CCNC: 37 U/L (ref 15–37)
BASOPHILS # BLD: 0.1 K/UL (ref 0–0.2)
BASOPHILS NFR BLD: 1 % (ref 0–2)
BILIRUB SERPL-MCNC: 0.5 MG/DL (ref 0–1.2)
BUN SERPL-MCNC: 16 MG/DL (ref 8–23)
CALCIUM SERPL-MCNC: 9.9 MG/DL (ref 8.8–10.2)
CHLORIDE SERPL-SCNC: 98 MMOL/L (ref 98–107)
CO2 SERPL-SCNC: 26 MMOL/L (ref 20–29)
CREAT SERPL-MCNC: 1.01 MG/DL (ref 0.8–1.3)
DIFFERENTIAL METHOD BLD: ABNORMAL
EKG ATRIAL RATE: 68 BPM
EKG DIAGNOSIS: NORMAL
EKG P AXIS: 62 DEGREES
EKG P-R INTERVAL: 167 MS
EKG Q-T INTERVAL: 409 MS
EKG QRS DURATION: 103 MS
EKG QTC CALCULATION (BAZETT): 432 MS
EKG R AXIS: 21 DEGREES
EKG T AXIS: 74 DEGREES
EKG VENTRICULAR RATE: 67 BPM
EOSINOPHIL # BLD: 0.1 K/UL (ref 0–0.8)
EOSINOPHIL NFR BLD: 1 % (ref 0.5–7.8)
ERYTHROCYTE [DISTWIDTH] IN BLOOD BY AUTOMATED COUNT: 13.3 % (ref 11.9–14.6)
GLOBULIN SER CALC-MCNC: 3.1 G/DL (ref 2.3–3.5)
GLUCOSE SERPL-MCNC: 92 MG/DL (ref 70–99)
HCT VFR BLD AUTO: 42.9 % (ref 41.1–50.3)
HGB BLD-MCNC: 14.1 G/DL (ref 13.6–17.2)
IMM GRANULOCYTES # BLD AUTO: 0 K/UL (ref 0–0.5)
IMM GRANULOCYTES NFR BLD AUTO: 0 % (ref 0–5)
LYMPHOCYTES # BLD: 0.8 K/UL (ref 0.5–4.6)
LYMPHOCYTES NFR BLD: 7 % (ref 13–44)
MAGNESIUM SERPL-MCNC: 2.4 MG/DL (ref 1.8–2.4)
MCH RBC QN AUTO: 31.8 PG (ref 26.1–32.9)
MCHC RBC AUTO-ENTMCNC: 32.9 G/DL (ref 31.4–35)
MCV RBC AUTO: 96.8 FL (ref 82–102)
MONOCYTES # BLD: 0.7 K/UL (ref 0.1–1.3)
MONOCYTES NFR BLD: 7 % (ref 4–12)
NEUTS SEG # BLD: 9.1 K/UL (ref 1.7–8.2)
NEUTS SEG NFR BLD: 84 % (ref 43–78)
NRBC # BLD: 0 K/UL (ref 0–0.2)
PLATELET # BLD AUTO: 238 K/UL (ref 150–450)
PMV BLD AUTO: 11.1 FL (ref 9.4–12.3)
POTASSIUM SERPL-SCNC: 3.5 MMOL/L (ref 3.5–5.1)
PROT SERPL-MCNC: 8 G/DL (ref 6.3–8.2)
RBC # BLD AUTO: 4.43 M/UL (ref 4.23–5.6)
SODIUM SERPL-SCNC: 139 MMOL/L (ref 136–145)
TROPONIN T SERPL HS-MCNC: 19 NG/L (ref 0–22)
WBC # BLD AUTO: 10.7 K/UL (ref 4.3–11.1)

## 2024-11-25 PROCEDURE — 84484 ASSAY OF TROPONIN QUANT: CPT

## 2024-11-25 PROCEDURE — 72100 X-RAY EXAM L-S SPINE 2/3 VWS: CPT

## 2024-11-25 PROCEDURE — 70450 CT HEAD/BRAIN W/O DYE: CPT

## 2024-11-25 PROCEDURE — 72125 CT NECK SPINE W/O DYE: CPT

## 2024-11-25 PROCEDURE — 96372 THER/PROPH/DIAG INJ SC/IM: CPT

## 2024-11-25 PROCEDURE — 85025 COMPLETE CBC W/AUTO DIFF WBC: CPT

## 2024-11-25 PROCEDURE — 80053 COMPREHEN METABOLIC PANEL: CPT

## 2024-11-25 PROCEDURE — 90714 TD VACC NO PRESV 7 YRS+ IM: CPT | Performed by: EMERGENCY MEDICINE

## 2024-11-25 PROCEDURE — 99285 EMERGENCY DEPT VISIT HI MDM: CPT

## 2024-11-25 PROCEDURE — 93005 ELECTROCARDIOGRAM TRACING: CPT | Performed by: STUDENT IN AN ORGANIZED HEALTH CARE EDUCATION/TRAINING PROGRAM

## 2024-11-25 PROCEDURE — 12032 INTMD RPR S/A/T/EXT 2.6-7.5: CPT

## 2024-11-25 PROCEDURE — 6360000002 HC RX W HCPCS: Performed by: EMERGENCY MEDICINE

## 2024-11-25 PROCEDURE — 93010 ELECTROCARDIOGRAM REPORT: CPT | Performed by: INTERNAL MEDICINE

## 2024-11-25 PROCEDURE — 90471 IMMUNIZATION ADMIN: CPT | Performed by: EMERGENCY MEDICINE

## 2024-11-25 PROCEDURE — 83735 ASSAY OF MAGNESIUM: CPT

## 2024-11-25 RX ORDER — MORPHINE SULFATE 4 MG/ML
4 INJECTION INTRAVENOUS ONCE
Status: COMPLETED | OUTPATIENT
Start: 2024-11-25 | End: 2024-11-25

## 2024-11-25 RX ORDER — LIDOCAINE HYDROCHLORIDE AND EPINEPHRINE 10; 10 MG/ML; UG/ML
20 INJECTION, SOLUTION INFILTRATION; PERINEURAL ONCE
Status: COMPLETED | OUTPATIENT
Start: 2024-11-25 | End: 2024-11-25

## 2024-11-25 RX ORDER — MORPHINE SULFATE 4 MG/ML
4 INJECTION INTRAVENOUS ONCE
Status: DISCONTINUED | OUTPATIENT
Start: 2024-11-25 | End: 2024-11-25

## 2024-11-25 RX ORDER — CEPHALEXIN 500 MG/1
500 CAPSULE ORAL 3 TIMES DAILY
Qty: 30 CAPSULE | Refills: 0 | Status: SHIPPED | OUTPATIENT
Start: 2024-11-25 | End: 2024-12-05

## 2024-11-25 RX ORDER — OXYCODONE HYDROCHLORIDE 5 MG/1
5 TABLET ORAL EVERY 4 HOURS PRN
Qty: 19 TABLET | Refills: 0 | Status: SHIPPED | OUTPATIENT
Start: 2024-11-25 | End: 2024-11-28

## 2024-11-25 RX ADMIN — MORPHINE SULFATE 4 MG: 4 INJECTION, SOLUTION INTRAMUSCULAR; INTRAVENOUS at 17:23

## 2024-11-25 RX ADMIN — CLOSTRIDIUM TETANI TOXOID ANTIGEN (FORMALDEHYDE INACTIVATED) AND CORYNEBACTERIUM DIPHTHERIAE TOXOID ANTIGEN (FORMALDEHYDE INACTIVATED) 0.5 ML: 5; 2 INJECTION, SUSPENSION INTRAMUSCULAR at 17:22

## 2024-11-25 RX ADMIN — LIDOCAINE HYDROCHLORIDE,EPINEPHRINE BITARTRATE 20 ML: 10; .01 INJECTION, SOLUTION INFILTRATION; PERINEURAL at 17:21

## 2024-11-25 ASSESSMENT — ENCOUNTER SYMPTOMS
EYE ITCHING: 0
BACK PAIN: 1
DIFFICULTY BREATHING: 0
VOMITING: 0
COUGH: 0
SHORTNESS OF BREATH: 0
CHEST TIGHTNESS: 0
NAUSEA: 0
EYE DISCHARGE: 0
DIARRHEA: 0

## 2024-11-25 ASSESSMENT — LIFESTYLE VARIABLES
HOW MANY STANDARD DRINKS CONTAINING ALCOHOL DO YOU HAVE ON A TYPICAL DAY: PATIENT DOES NOT DRINK
HOW OFTEN DO YOU HAVE A DRINK CONTAINING ALCOHOL: NEVER

## 2024-11-25 ASSESSMENT — PAIN - FUNCTIONAL ASSESSMENT: PAIN_FUNCTIONAL_ASSESSMENT: NONE - DENIES PAIN

## 2024-11-25 NOTE — ED TRIAGE NOTES
Pt presents after episode of syncope earlier today.  Pt hit his head and has an approx 1\" laceration to the back of the head.  Pt endorses LOC prior to hitting his head.  Pt takes one baby asa once daily and no other blood thinners.    Pt states he has a hx of low blood pressure.  Pt is on Reclast and has been doing HBO treatments so that he can have his teeth pulled.  Pt states he had an HBO treatment today.

## 2024-11-25 NOTE — DISCHARGE INSTRUCTIONS
Complete all antibiotics  Take medications as directed  Drink plenty of fluids  Suture/staple removal in 8 to 10 days  Keep area covered with Neosporin  Wash gently with regular soap or shampoo and warm water  Do not use alcohol or peroxide on the wound as this will delay the healing process  Monitor for any signs of infection, including increasing pain, redness swelling or drainage    Call your doctor or the follow up doctor to set up appointment for recheck visit    Return to ER for any worsening symptoms or new problems which may arise

## 2024-11-25 NOTE — ED PROVIDER NOTES
Take 1 tablet by mouth daily    CELECOXIB (CELEBREX) 200 MG CAPSULE    Take 1 capsule by mouth daily as needed for Pain    CYCLOBENZAPRINE (FLEXERIL) 5 MG TABLET    Take 1 tablet by mouth 2 times daily as needed    DULOXETINE (CYMBALTA) 60 MG EXTENDED RELEASE CAPSULE    TAKE 1 CAPSULE EVERY DAY    FERROUS SULFATE (IRON 325) 325 (65 FE) MG TABLET    Take by mouth every evening    LEVOTHYROXINE (SYNTHROID) 50 MCG TABLET    TAKE 1 TABLET EVERY DAY    LUTEIN 6 MG TABS    Take 20 mg by mouth daily    MULTIPLE VITAMIN (MULTI-VITAMIN DAILY PO)    Take 0.5 tablets by mouth daily    OMEGA-3 FATTY ACIDS (FISH OIL) 300 MG CAPS    Take by mouth    OMEPRAZOLE (PRILOSEC) 40 MG DELAYED RELEASE CAPSULE    TAKE 1 CAPSULE EVERY MORNING    SODIUM FLUORIDE, DENTAL GEL, 1.1 % GEL    USE AS DIRECTED    VITAMIN C (ASCORBIC ACID) 500 MG TABLET    Take 1 tablet by mouth every other day    VITAMIN D 25 MCG (1000 UT) CAPS    Take 1 capsule by mouth every other day        Results for orders placed or performed during the hospital encounter of 11/25/24   CT CSpine W/O Contrast    Narrative    CT of the Cervical Spine    INDICATION:  fall w/ head trauma    TECHNIQUE: [id number]  axial images were obtained through the cervical spine  without intravenous contrast. Coronal and sagittal reformatted images were also  reviewed.  Radiation dose reduction techniques were used for this study:  All CT  scans performed at this facility use one or all of the following: Automated  exposure control, adjustment of the mA and/or kVp according to patient's size,  iterative reconstruction.    COMPARISON: None    FINDINGS:  There is no evidence of fracture or subluxation.  No bony lesions are seen.   There is no prevertebral soft tissue swelling. Biapical scarring and pleural  calcification suspicious for prior spaces exposure. Moderate discogenic  degenerative changes visualized fine.      Impression    1.  No acute abnormality noted in cervical spine.  2.

## 2024-11-26 ENCOUNTER — TELEPHONE (OUTPATIENT)
Dept: FAMILY MEDICINE CLINIC | Facility: CLINIC | Age: 74
End: 2024-11-26

## 2024-11-26 NOTE — TELEPHONE ENCOUNTER
----- Message from Ma. R sent at 11/26/2024  3:13 PM EST -----  Regarding: [facility] Appointment Request  E[facility] Appointment Request    Patient needs appointment for E[facility] Appointment Type: ED Follow-Up.    Patient Requested Dates(s): December 4, 2024  Patient Requested Time: 3:00 PM   Provider Name: Hugh Vance MD    Reason for Appointment Request: Established Patient - Available appointments did not meet patient need  --------------------------------------------------------------------------------------------------------------------------    Relationship to Patient: Self     Call Back Information: OK to leave message on voicemail  Preferred Call Back Number: Phone [phone] (home)

## 2024-12-02 SDOH — ECONOMIC STABILITY: INCOME INSECURITY: HOW HARD IS IT FOR YOU TO PAY FOR THE VERY BASICS LIKE FOOD, HOUSING, MEDICAL CARE, AND HEATING?: NOT VERY HARD

## 2024-12-02 SDOH — ECONOMIC STABILITY: FOOD INSECURITY: WITHIN THE PAST 12 MONTHS, YOU WORRIED THAT YOUR FOOD WOULD RUN OUT BEFORE YOU GOT MONEY TO BUY MORE.: NEVER TRUE

## 2024-12-02 SDOH — ECONOMIC STABILITY: FOOD INSECURITY: WITHIN THE PAST 12 MONTHS, THE FOOD YOU BOUGHT JUST DIDN'T LAST AND YOU DIDN'T HAVE MONEY TO GET MORE.: NEVER TRUE

## 2024-12-02 SDOH — ECONOMIC STABILITY: TRANSPORTATION INSECURITY
IN THE PAST 12 MONTHS, HAS LACK OF TRANSPORTATION KEPT YOU FROM MEETINGS, WORK, OR FROM GETTING THINGS NEEDED FOR DAILY LIVING?: NO

## 2024-12-03 ENCOUNTER — OFFICE VISIT (OUTPATIENT)
Dept: FAMILY MEDICINE CLINIC | Facility: CLINIC | Age: 74
End: 2024-12-03

## 2024-12-03 VITALS
WEIGHT: 149.6 LBS | SYSTOLIC BLOOD PRESSURE: 100 MMHG | HEIGHT: 72 IN | DIASTOLIC BLOOD PRESSURE: 60 MMHG | HEART RATE: 51 BPM | BODY MASS INDEX: 20.26 KG/M2 | TEMPERATURE: 98.1 F | OXYGEN SATURATION: 100 %

## 2024-12-03 DIAGNOSIS — Z48.02 ENCOUNTER FOR REMOVAL OF SUTURES: Primary | ICD-10-CM

## 2024-12-06 ASSESSMENT — ENCOUNTER SYMPTOMS
EYE PAIN: 0
ABDOMINAL DISTENTION: 0
SHORTNESS OF BREATH: 0
BLOOD IN STOOL: 0
COUGH: 0
PHOTOPHOBIA: 0
ABDOMINAL PAIN: 0
SORE THROAT: 0
COLOR CHANGE: 0
NAUSEA: 0

## 2024-12-06 NOTE — PROGRESS NOTES
Zachariah Kiran  1950 is a 74 y.o. male ,Established patient, here for evaluation of the following chief complaint(s):   Chief Complaint   Patient presents with    Suture / Staple Removal     Has 7 staples and 2 sutures needing to be removed- I removed 7 staples and 2 sutures from back of patient's scalp- the top part of the wound where staples were did open a little put 2 steri strips but did advise patient if they did not hold and could not stop bleeding to go to urgent care           1. Encounter for removal of sutures--a total of 2 stitches and 7 staples were removed.        No follow-ups on file.        Subjective   SUBJECTIVE/OBJECTIVE:  Suture / Staple Removal  The sutures were placed 7 to 10 days ago. He tried nothing since the wound repair. The maximum temperature noted was less than 100.4 F. There has been no drainage from the wound. The redness has improved. The swelling has improved. There is no pain present.       Review of Systems   Constitutional:  Negative for chills and fever.   HENT:  Negative for ear pain, hearing loss and sore throat.    Eyes:  Negative for photophobia and pain.   Respiratory:  Negative for cough and shortness of breath.    Cardiovascular:  Negative for chest pain, palpitations and leg swelling.   Gastrointestinal:  Negative for abdominal distention, abdominal pain, blood in stool and nausea.   Genitourinary:  Negative for dysuria and urgency.   Musculoskeletal:  Negative for joint swelling and myalgias.   Skin:  Negative for color change, pallor and rash.   Neurological:  Negative for speech difficulty, weakness, light-headedness and headaches.   Hematological:  Negative for adenopathy.   Psychiatric/Behavioral:  Negative for agitation, confusion, hallucinations, self-injury and suicidal ideas.           Objective   Physical Exam  Constitutional:       Appearance: Normal appearance.   HENT:      Head: Normocephalic and atraumatic.      Nose: Nose normal.   Eyes:

## 2025-01-31 SDOH — HEALTH STABILITY: PHYSICAL HEALTH: ON AVERAGE, HOW MANY DAYS PER WEEK DO YOU ENGAGE IN MODERATE TO STRENUOUS EXERCISE (LIKE A BRISK WALK)?: 4 DAYS

## 2025-01-31 SDOH — HEALTH STABILITY: PHYSICAL HEALTH: ON AVERAGE, HOW MANY MINUTES DO YOU ENGAGE IN EXERCISE AT THIS LEVEL?: 40 MIN

## 2025-01-31 ASSESSMENT — LIFESTYLE VARIABLES
HOW OFTEN DO YOU HAVE SIX OR MORE DRINKS ON ONE OCCASION: 1
HOW MANY STANDARD DRINKS CONTAINING ALCOHOL DO YOU HAVE ON A TYPICAL DAY: PATIENT DOES NOT DRINK
HOW OFTEN DO YOU HAVE A DRINK CONTAINING ALCOHOL: NEVER
HOW MANY STANDARD DRINKS CONTAINING ALCOHOL DO YOU HAVE ON A TYPICAL DAY: 0
HOW OFTEN DO YOU HAVE A DRINK CONTAINING ALCOHOL: 1

## 2025-02-01 SDOH — ECONOMIC STABILITY: INCOME INSECURITY: IN THE LAST 12 MONTHS, WAS THERE A TIME WHEN YOU WERE NOT ABLE TO PAY THE MORTGAGE OR RENT ON TIME?: NO

## 2025-02-01 SDOH — ECONOMIC STABILITY: FOOD INSECURITY: WITHIN THE PAST 12 MONTHS, YOU WORRIED THAT YOUR FOOD WOULD RUN OUT BEFORE YOU GOT MONEY TO BUY MORE.: NEVER TRUE

## 2025-02-01 SDOH — ECONOMIC STABILITY: FOOD INSECURITY: WITHIN THE PAST 12 MONTHS, THE FOOD YOU BOUGHT JUST DIDN'T LAST AND YOU DIDN'T HAVE MONEY TO GET MORE.: NEVER TRUE

## 2025-02-01 SDOH — ECONOMIC STABILITY: TRANSPORTATION INSECURITY
IN THE PAST 12 MONTHS, HAS THE LACK OF TRANSPORTATION KEPT YOU FROM MEDICAL APPOINTMENTS OR FROM GETTING MEDICATIONS?: NO

## 2025-02-04 ENCOUNTER — OFFICE VISIT (OUTPATIENT)
Dept: FAMILY MEDICINE CLINIC | Facility: CLINIC | Age: 75
End: 2025-02-04

## 2025-02-04 VITALS
HEIGHT: 72 IN | TEMPERATURE: 98.2 F | BODY MASS INDEX: 19.59 KG/M2 | HEART RATE: 67 BPM | WEIGHT: 144.6 LBS | DIASTOLIC BLOOD PRESSURE: 68 MMHG | SYSTOLIC BLOOD PRESSURE: 110 MMHG | OXYGEN SATURATION: 97 %

## 2025-02-04 DIAGNOSIS — E03.8 OTHER SPECIFIED HYPOTHYROIDISM: ICD-10-CM

## 2025-02-04 DIAGNOSIS — M87.9 OSTEONECROSIS OF JAW: ICD-10-CM

## 2025-02-04 DIAGNOSIS — E55.9 VITAMIN D DEFICIENCY: ICD-10-CM

## 2025-02-04 DIAGNOSIS — R97.20 INCREASED PROSTATE SPECIFIC ANTIGEN (PSA) VELOCITY: ICD-10-CM

## 2025-02-04 DIAGNOSIS — Z00.00 MEDICARE ANNUAL WELLNESS VISIT, SUBSEQUENT: Primary | ICD-10-CM

## 2025-02-04 DIAGNOSIS — D50.8 IRON DEFICIENCY ANEMIA SECONDARY TO INADEQUATE DIETARY IRON INTAKE: ICD-10-CM

## 2025-02-04 LAB
25(OH)D3 SERPL-MCNC: 47.1 NG/ML (ref 30–100)
ALBUMIN SERPL-MCNC: 4 G/DL (ref 3.2–4.6)
ALBUMIN/GLOB SERPL: 1.4 (ref 1–1.9)
ALP SERPL-CCNC: 89 U/L (ref 40–129)
ALT SERPL-CCNC: 31 U/L (ref 8–55)
ANION GAP SERPL CALC-SCNC: 9 MMOL/L (ref 7–16)
AST SERPL-CCNC: 34 U/L (ref 15–37)
BASOPHILS # BLD: 0.09 K/UL (ref 0–0.2)
BASOPHILS NFR BLD: 1.8 % (ref 0–2)
BILIRUB SERPL-MCNC: 0.3 MG/DL (ref 0–1.2)
BUN SERPL-MCNC: 20 MG/DL (ref 8–23)
CALCIUM SERPL-MCNC: 9.5 MG/DL (ref 8.8–10.2)
CHLORIDE SERPL-SCNC: 103 MMOL/L (ref 98–107)
CHOLEST SERPL-MCNC: 147 MG/DL (ref 0–200)
CO2 SERPL-SCNC: 30 MMOL/L (ref 20–29)
CREAT SERPL-MCNC: 1.02 MG/DL (ref 0.8–1.3)
DIFFERENTIAL METHOD BLD: ABNORMAL
EOSINOPHIL # BLD: 0.22 K/UL (ref 0–0.8)
EOSINOPHIL NFR BLD: 4.5 % (ref 0.5–7.8)
ERYTHROCYTE [DISTWIDTH] IN BLOOD BY AUTOMATED COUNT: 13.1 % (ref 11.9–14.6)
FERRITIN SERPL-MCNC: 90 NG/ML (ref 8–388)
GLOBULIN SER CALC-MCNC: 2.8 G/DL (ref 2.3–3.5)
GLUCOSE SERPL-MCNC: 91 MG/DL (ref 70–99)
HCT VFR BLD AUTO: 41.5 % (ref 41.1–50.3)
HDLC SERPL-MCNC: 47 MG/DL (ref 40–60)
HDLC SERPL: 3.1 (ref 0–5)
HGB BLD-MCNC: 13.2 G/DL (ref 13.6–17.2)
IMM GRANULOCYTES # BLD AUTO: 0.01 K/UL (ref 0–0.5)
IMM GRANULOCYTES NFR BLD AUTO: 0.2 % (ref 0–5)
IRON SERPL-MCNC: 80 UG/DL (ref 35–100)
LDLC SERPL CALC-MCNC: 90 MG/DL (ref 0–100)
LYMPHOCYTES # BLD: 1.04 K/UL (ref 0.5–4.6)
LYMPHOCYTES NFR BLD: 21.3 % (ref 13–44)
MCH RBC QN AUTO: 32.1 PG (ref 26.1–32.9)
MCHC RBC AUTO-ENTMCNC: 31.8 G/DL (ref 31.4–35)
MCV RBC AUTO: 101 FL (ref 82–102)
MONOCYTES # BLD: 0.75 K/UL (ref 0.1–1.3)
MONOCYTES NFR BLD: 15.3 % (ref 4–12)
NEUTS SEG # BLD: 2.78 K/UL (ref 1.7–8.2)
NEUTS SEG NFR BLD: 56.9 % (ref 43–78)
NRBC # BLD: 0 K/UL (ref 0–0.2)
PLATELET # BLD AUTO: 235 K/UL (ref 150–450)
PMV BLD AUTO: 11.8 FL (ref 9.4–12.3)
POTASSIUM SERPL-SCNC: 4.7 MMOL/L (ref 3.5–5.1)
PROT SERPL-MCNC: 6.9 G/DL (ref 6.3–8.2)
PSA SERPL-MCNC: 3.9 NG/ML (ref 0–4)
RBC # BLD AUTO: 4.11 M/UL (ref 4.23–5.6)
SODIUM SERPL-SCNC: 142 MMOL/L (ref 136–145)
TRIGL SERPL-MCNC: 51 MG/DL (ref 0–150)
TSH, 3RD GENERATION: 0.08 UIU/ML (ref 0.27–4.2)
VLDLC SERPL CALC-MCNC: 10 MG/DL (ref 6–23)
WBC # BLD AUTO: 4.9 K/UL (ref 4.3–11.1)

## 2025-02-04 SDOH — ECONOMIC STABILITY: FOOD INSECURITY: WITHIN THE PAST 12 MONTHS, THE FOOD YOU BOUGHT JUST DIDN'T LAST AND YOU DIDN'T HAVE MONEY TO GET MORE.: NEVER TRUE

## 2025-02-04 SDOH — ECONOMIC STABILITY: FOOD INSECURITY: WITHIN THE PAST 12 MONTHS, YOU WORRIED THAT YOUR FOOD WOULD RUN OUT BEFORE YOU GOT MONEY TO BUY MORE.: NEVER TRUE

## 2025-02-04 NOTE — PROGRESS NOTES
Zachariah Kiran  1950 is a 74 y.o. male ,Established patient, here for evaluation of the following chief complaint(s):   Chief Complaint   Patient presents with    Medicare AWV     Pt is fasting-           1. Medicare annual wellness visit, subsequent  2. Osteonecrosis of jaw  -     CBC with Auto Differential; Future  -     Comprehensive Metabolic Panel; Future  3. Other specified hypothyroidism  -     Lipid Panel; Future  -     TSH; Future  4. Vitamin D deficiency  -     Vitamin D 25 Hydroxy; Future  5. Iron deficiency anemia secondary to inadequate dietary iron intake  -     Iron; Future  -     Ferritin; Future  6. Increased prostate specific antigen (PSA) velocity  -     PSA, Diagnostic; Future        No follow-ups on file.        Subjective   SUBJECTIVE/OBJECTIVE:  He had a WOUND OSTOMY WITH JAW NECROSIS==better. He is holding his dentures.    Gastroesophageal Reflux  He reports no abdominal pain, no chest pain, no choking, no coughing, no early satiety, no hoarse voice, no nausea or no sore throat. Associated symptoms include anemia.   Depression  Visit Type: follow-up  Patient is not experiencing: choking sensation, confusion, feelings of worthlessness, hypersomnia, hyperventilation, insomnia, palpitations, shortness of breath, suicidal ideas, suicidal planning and thoughts of death.    Patient has a history of: anemia    Arthritis  Presents for follow-up visit. He reports no pain or joint swelling. The symptoms have been stable. Pertinent negatives include no dysuria, fever or rash.   Anemia  Presents for follow-up visit. There has been no abdominal pain, confusion, fever, light-headedness, pallor, palpitations, paresthesias or pica.   Thyroid Problem  Presents for follow-up visit. Patient reports no constipation, hoarse voice, leg swelling, nail problem or palpitations.       Review of Systems   Constitutional:  Negative for chills and fever.   HENT:  Negative for ear pain, hearing loss, hoarse voice

## 2025-02-05 RX ORDER — OMEPRAZOLE 40 MG/1
CAPSULE, DELAYED RELEASE ORAL
Qty: 90 CAPSULE | Refills: 3 | Status: SHIPPED | OUTPATIENT
Start: 2025-02-05

## 2025-02-06 ASSESSMENT — ENCOUNTER SYMPTOMS
CHOKING: 0
NAUSEA: 0
SHORTNESS OF BREATH: 0
ABDOMINAL PAIN: 0
ABDOMINAL DISTENTION: 0
HYPERVENTILATION: 0
SORE THROAT: 0
COLOR CHANGE: 0
BLOOD IN STOOL: 0
COUGH: 0
HOARSE VOICE: 0
EYE PAIN: 0
PHOTOPHOBIA: 0
CONSTIPATION: 0

## 2025-02-18 DIAGNOSIS — E03.8 OTHER SPECIFIED HYPOTHYROIDISM: Primary | ICD-10-CM

## 2025-04-15 NOTE — PATIENT INSTRUCTIONS
Patient Information from Today's Visit    The members of your Oncology Medical Home are listed below:    Physician Provider: Juan Mazariegos, Urologic Oncologist  Advanced Practice Clinician: JAMEL Moses  Nurse Navigator: Britt BUSTILLOS RN  Medical Assistant: Charlene ARENAS MA  :Jeannie MCDANIEL  Supportive Care Services: Adriana HARRISON LMSW    Diagnosis :      Follow Up Instructions:    Follow up visit today   Follow up office visit in August with Dr. Mazariegos  Repeat MRI in August before office visit  *MRI prior to office visit - you can call the following number to schedule this if you do not hear from them within 2-3 weeks of your appointment in August.    You can call to schedule this at [phone].   Repeat lab in August on the same day of MRI or prior to office visit with Dr. Yair Pillai [facility]    [facility]: [address]  Phone: [phone] Hours: Mon - Fri 730am - 430pm    [facility]: [address] , [address]  Phone: [phone] Hours: Mon - Fri 730am - 430pm    [facility]: [address]  Phone: [phone] Hours: Mon - Fri 700am - 430pm    [facility]: [address]  Phone: [phone] Hours: Mon - Fri 730am - 430pm     [facility]  [address] [address]  Phone [phone], Fax [phone]  Mon.-Fri. 7:30 a.m. - 4:30 p.m       Current Labs:    Results for orders placed or performed in visit on 02/04/25   PSA, Diagnostic   Result Value Ref Range    PSA 3.9 0.0 - 4.0 ng/mL   Ferritin   Result Value Ref Range    Ferritin 90 8 - 388 NG/ML   Iron   Result Value Ref Range    Iron 80 35 - 100 ug/dL   TSH   Result Value Ref Range    TSH, 3rd Generation 0.082 (L) 0.270 - 4.200 uIU/mL   Vitamin D 25 Hydroxy   Result Value Ref Range    Vit D, 25-Hydroxy 47.1 30.0 - 100.0 ng/mL   Lipid Panel   Result Value Ref Range

## 2025-04-17 ENCOUNTER — OFFICE VISIT (OUTPATIENT)
Dept: ONCOLOGY | Age: 75
End: 2025-04-17
Payer: MEDICARE

## 2025-04-17 VITALS
DIASTOLIC BLOOD PRESSURE: 75 MMHG | WEIGHT: 148 LBS | TEMPERATURE: 97.7 F | OXYGEN SATURATION: 98 % | SYSTOLIC BLOOD PRESSURE: 103 MMHG | HEIGHT: 72 IN | BODY MASS INDEX: 20.05 KG/M2 | RESPIRATION RATE: 18 BRPM | HEART RATE: 69 BPM

## 2025-04-17 DIAGNOSIS — C61 PROSTATE CANCER (HCC): Primary | ICD-10-CM

## 2025-04-17 PROCEDURE — 1126F AMNT PAIN NOTED NONE PRSNT: CPT | Performed by: PHYSICIAN ASSISTANT

## 2025-04-17 PROCEDURE — 99213 OFFICE O/P EST LOW 20 MIN: CPT | Performed by: PHYSICIAN ASSISTANT

## 2025-04-17 PROCEDURE — 1036F TOBACCO NON-USER: CPT | Performed by: PHYSICIAN ASSISTANT

## 2025-04-17 PROCEDURE — 3017F COLORECTAL CA SCREEN DOC REV: CPT | Performed by: PHYSICIAN ASSISTANT

## 2025-04-17 PROCEDURE — G8427 DOCREV CUR MEDS BY ELIG CLIN: HCPCS | Performed by: PHYSICIAN ASSISTANT

## 2025-04-17 PROCEDURE — G8420 CALC BMI NORM PARAMETERS: HCPCS | Performed by: PHYSICIAN ASSISTANT

## 2025-04-17 PROCEDURE — 1160F RVW MEDS BY RX/DR IN RCRD: CPT | Performed by: PHYSICIAN ASSISTANT

## 2025-04-17 PROCEDURE — 1159F MED LIST DOCD IN RCRD: CPT | Performed by: PHYSICIAN ASSISTANT

## 2025-04-17 PROCEDURE — 1123F ACP DISCUSS/DSCN MKR DOCD: CPT | Performed by: PHYSICIAN ASSISTANT

## 2025-04-17 NOTE — PROGRESS NOTES
Urologic Oncology  [facility] Hematology & Oncology  [address] [place] [id number]  [phone]        Mr. Zachariah Kiran is a 74 y.o. male with a diagnosis of GS 3+4=7 prostate cancer diagnosed on biopsy 4/9/2024. PSA 4.4 on 2/16/2024.  cT1c . On AS.     Treatment: AS   [place]:  GS 3+4=7 prostate cancer 4/9/24, cT1c, 2/13 cores positive, 3+4 involving 30% and 3+3 involving 10%, cT1c   PSA: 4.4 2/16/24  Clinical Stage: cT1c  Prostate MRI: 2/27/24 - 30 cc, PI-RADS 4 right mid gland TZ 1.1 cm      INTERVAL HISTORY:    Patient returns today for follow-up evaluation of his NCCN favorable intermediate risk prostate cancer diagnosed on transperineal prostate biopsy 4/9/2024.    Since his last visit he has had complete dental extraction and completion of HBO for complications from prior radiation for head neck cancer.  He states he was recovering well from both.    He denies any significant lower urinary tract symptoms.  He empties his bladder and voids with good stream.  No significant daytime frequency or urgency issues.  No dysuria or hematuria.    Patient had PSA checked by PCP on 2/4/2025 that was down to 3.9, previously 4.9 in October 2024 and as high as 5.2 on 12/14/2023.    Past medical, family and social histories, as well as medications and allergies, were reviewed and updated in the medical record as appropriate.    PMH:     Past Medical History:   Diagnosis Date    Arthritis     osteoporosis in joints    Cataract 04/17    Chronic back pain 2000    COVID 11/13/2021    no hospitalization    DJD (degenerative joint disease)     back    Dysphagia, unspecified(787.20)     followed by dr cr    Esophageal stricture     has esophagus stretched every 3-4 months    GERD (gastroesophageal reflux disease)     OMEPRAZOLE DAILY- WELL CONTROLLED     History of squamous cell carcinoma 2008    at base of tongue/throat  surg, chemo and radiation    Hypercholesteremia     Hypothyroid     manaaged with

## 2025-05-12 RX ORDER — DULOXETIN HYDROCHLORIDE 60 MG/1
60 CAPSULE, DELAYED RELEASE ORAL DAILY
Qty: 90 CAPSULE | Refills: 3 | Status: SHIPPED | OUTPATIENT
Start: 2025-05-12

## 2025-08-18 ENCOUNTER — HOSPITAL ENCOUNTER (OUTPATIENT)
Dept: LAB | Age: 75
Discharge: HOME | End: 2025-08-18
Payer: MEDICARE

## 2025-08-18 ENCOUNTER — OFFICE VISIT (OUTPATIENT)
Age: 75
End: 2025-08-18

## 2025-08-18 VITALS
BODY MASS INDEX: 19.77 KG/M2 | WEIGHT: 146 LBS | HEART RATE: 63 BPM | DIASTOLIC BLOOD PRESSURE: 76 MMHG | HEIGHT: 72 IN | SYSTOLIC BLOOD PRESSURE: 111 MMHG | OXYGEN SATURATION: 100 % | RESPIRATION RATE: 18 BRPM

## 2025-08-18 DIAGNOSIS — C61 PROSTATE CANCER (HCC): ICD-10-CM

## 2025-08-18 DIAGNOSIS — C61 PROSTATE CANCER (HCC): Primary | ICD-10-CM

## 2025-08-18 PROBLEM — R97.20 ELEVATED PSA: Status: ACTIVE | Noted: 2025-08-18

## 2025-08-18 LAB — PSA SERPL-MCNC: 5 NG/ML (ref 0–4)

## 2025-08-18 PROCEDURE — 84153 ASSAY OF PSA TOTAL: CPT

## 2025-08-18 PROCEDURE — 36415 COLL VENOUS BLD VENIPUNCTURE: CPT

## 2025-08-18 ASSESSMENT — ENCOUNTER SYMPTOMS
RESPIRATORY NEGATIVE: 1
GASTROINTESTINAL NEGATIVE: 1

## (undated) DEVICE — BLOCK BITE AD 60FR W/ VELC STRP ADDRESSES MOST PT AND

## (undated) DEVICE — CONNECTOR TBNG OD5-7MM O2 END DISP

## (undated) DEVICE — CANNULA NSL ORAL AD FOR CAPNOFLEX CO2 O2 AIRLFE

## (undated) DEVICE — CONTAINER PREFIL FRMLN 40ML --

## (undated) DEVICE — NEEDLE SYR 18GA L1.5IN RED PLAS HUB S STL BLNT FILL W/O

## (undated) DEVICE — KENDALL RADIOLUCENT FOAM MONITORING ELECTRODE RECTANGULAR SHAPE: Brand: KENDALL

## (undated) DEVICE — SINGLE PORT MANIFOLD: Brand: NEPTUNE 2

## (undated) DEVICE — NEEDLE HYPO 21GA L1.25IN GRN POLYPR HUB S STL REG BVL STR

## (undated) DEVICE — MAX-CORE® DISPOSABLE CORE BIOPSY INSTRUMENT, 18G X 25CM: Brand: MAX-CORE

## (undated) DEVICE — SYR 3ML LL TIP 1/10ML GRAD --

## (undated) DEVICE — TUBING, SUCTION, 3/16" X 6', STRAIGHT: Brand: MEDLINE

## (undated) DEVICE — BANDAGE COMPR W1INXL5YD FOAM COHESIVE QUIK STK SELF ADH SFT

## (undated) DEVICE — JELLY LUBRICATING 10GM PREFIL SYR LUBE

## (undated) DEVICE — SYRINGE MED 10ML LUERLOCK TIP W/O SFTY DISP

## (undated) DEVICE — LUBE JELLY FOIL PACK 1.4 OZ: Brand: MEDLINE INDUSTRIES, INC.

## (undated) DEVICE — GAUZE,SPONGE,4"X4",12PLY,WOVEN,NS,LF: Brand: MEDLINE

## (undated) DEVICE — SINGLE-USE BIOPSY FORCEPS: Brand: RADIAL JAW 4

## (undated) DEVICE — AIRLIFE™ OXYGEN TUBING 7 FEET (2.1 M) CRUSH RESISTANT OXYGEN TUBING, VINYL TIPPED: Brand: AIRLIFE™

## (undated) DEVICE — DRAPE TWL SURG 16X26IN BLU ORB04] ALLCARE INC]

## (undated) DEVICE — COVER PRB W1XL11.8IN ENDOCAVITY CLR E BND NEOGUARD

## (undated) DEVICE — BLOCK BITE 60FR W/ DENT RIM SCRIP ONLY

## (undated) DEVICE — NDL PRT INJ NSAF BLNT 18GX1.5 --

## (undated) DEVICE — FORCEPS BX L240CM JAW DIA2.8MM L CAP W/ NDL MIC MESH TOOTH

## (undated) DEVICE — SYRINGE MED 3ML CLR PLAS STD N CTRL LUERLOCK TIP DISP

## (undated) DEVICE — BW-412T DISP COMBO CLEANING BRUSH: Brand: SINGLE USE COMBINATION CLEANING BRUSH

## (undated) DEVICE — KIT BX PROST 20ML VI PREFIL W 10ML 10% NEUT BUFF FRMLN LEAK

## (undated) DEVICE — VALVE SUCTION AIR H2O SET ORCA POD + DISP

## (undated) DEVICE — SYRINGE, LUER SLIP, STERILE, 60ML: Brand: MEDLINE

## (undated) DEVICE — SYR 5ML 1/5 GRAD LL NSAF LF --

## (undated) DEVICE — K-Y LUBRICANT JELLY 4OZ

## (undated) DEVICE — APPLICATOR SKIN PREP 10% PVP IODINE WITH CHLORAPREP SEPP

## (undated) DEVICE — CONTAINER FORMALIN PREFILLED 10% NBF 60ML

## (undated) DEVICE — NON-STERILE (4 X 30CM) COVER: Brand: NEOGUARD™

## (undated) DEVICE — YANKAUER,BULB TIP,W/O VENT,RIGID,STERILE: Brand: MEDLINE

## (undated) DEVICE — SKIN MARKER,REGULAR TIP WITH RULER: Brand: DEVON

## (undated) DEVICE — CANNULA NSL AD CO2 SAMP FOR DASH 3000 4000 MON LO FLO

## (undated) DEVICE — AIR/WATER CLEANING ADAPTER FOR OLYMPUS® GI ENDOSCOPE: Brand: BULLDOG®